# Patient Record
Sex: FEMALE | Race: OTHER | HISPANIC OR LATINO
[De-identification: names, ages, dates, MRNs, and addresses within clinical notes are randomized per-mention and may not be internally consistent; named-entity substitution may affect disease eponyms.]

---

## 2017-01-05 ENCOUNTER — APPOINTMENT (OUTPATIENT)
Dept: ELECTROPHYSIOLOGY | Facility: CLINIC | Age: 82
End: 2017-01-05

## 2017-01-05 DIAGNOSIS — I42.9 CARDIOMYOPATHY, UNSPECIFIED: ICD-10-CM

## 2017-01-05 DIAGNOSIS — I44.7 LEFT BUNDLE-BRANCH BLOCK, UNSPECIFIED: ICD-10-CM

## 2017-03-01 ENCOUNTER — OUTPATIENT (OUTPATIENT)
Dept: OUTPATIENT SERVICES | Facility: HOSPITAL | Age: 82
LOS: 1 days | End: 2017-03-01
Payer: MEDICAID

## 2017-03-06 DIAGNOSIS — R69 ILLNESS, UNSPECIFIED: ICD-10-CM

## 2017-03-09 ENCOUNTER — EMERGENCY (EMERGENCY)
Facility: HOSPITAL | Age: 82
LOS: 1 days | Discharge: HOME | End: 2017-03-09
Attending: EMERGENCY MEDICINE
Payer: MEDICARE

## 2017-03-09 VITALS
SYSTOLIC BLOOD PRESSURE: 95 MMHG | OXYGEN SATURATION: 100 % | RESPIRATION RATE: 16 BRPM | WEIGHT: 119.93 LBS | HEART RATE: 60 BPM | DIASTOLIC BLOOD PRESSURE: 68 MMHG | HEIGHT: 59 IN | TEMPERATURE: 99 F

## 2017-03-09 DIAGNOSIS — Z95.0 PRESENCE OF CARDIAC PACEMAKER: Chronic | ICD-10-CM

## 2017-03-09 LAB
ALBUMIN SERPL ELPH-MCNC: 2.8 G/DL — LOW (ref 3.5–5)
ALP SERPL-CCNC: 160 U/L — HIGH (ref 40–120)
ALT FLD-CCNC: 22 U/L DA — SIGNIFICANT CHANGE UP (ref 10–60)
ANION GAP SERPL CALC-SCNC: 11 MMOL/L — SIGNIFICANT CHANGE UP (ref 5–17)
AST SERPL-CCNC: 49 U/L — HIGH (ref 10–40)
BILIRUB SERPL-MCNC: 1.3 MG/DL — HIGH (ref 0.2–1.2)
BUN SERPL-MCNC: 58 MG/DL — HIGH (ref 7–18)
CALCIUM SERPL-MCNC: 8.5 MG/DL — SIGNIFICANT CHANGE UP (ref 8.4–10.5)
CHLORIDE SERPL-SCNC: 95 MMOL/L — LOW (ref 96–108)
CO2 SERPL-SCNC: 27 MMOL/L — SIGNIFICANT CHANGE UP (ref 22–31)
CREAT SERPL-MCNC: 1.45 MG/DL — HIGH (ref 0.5–1.3)
GLUCOSE SERPL-MCNC: 127 MG/DL — HIGH (ref 70–99)
HCT VFR BLD CALC: 38.7 % — SIGNIFICANT CHANGE UP (ref 34.5–45)
HGB BLD-MCNC: 12.8 G/DL — SIGNIFICANT CHANGE UP (ref 11.5–15.5)
MCHC RBC-ENTMCNC: 32.5 PG — SIGNIFICANT CHANGE UP (ref 27–34)
MCHC RBC-ENTMCNC: 33.1 GM/DL — SIGNIFICANT CHANGE UP (ref 32–36)
MCV RBC AUTO: 98.4 FL — SIGNIFICANT CHANGE UP (ref 80–100)
NT-PROBNP SERPL-SCNC: HIGH PG/ML (ref 0–450)
PLATELET # BLD AUTO: 200 K/UL — SIGNIFICANT CHANGE UP (ref 150–400)
POTASSIUM SERPL-MCNC: 5.4 MMOL/L — HIGH (ref 3.5–5.3)
POTASSIUM SERPL-SCNC: 5.4 MMOL/L — HIGH (ref 3.5–5.3)
PROT SERPL-MCNC: 8 G/DL — SIGNIFICANT CHANGE UP (ref 6–8.3)
RAPID RVP RESULT: DETECTED
RBC # BLD: 3.93 M/UL — SIGNIFICANT CHANGE UP (ref 3.8–5.2)
RBC # FLD: 13.6 % — SIGNIFICANT CHANGE UP (ref 10.3–14.5)
RSV RNA SPEC QL NAA+PROBE: DETECTED
SODIUM SERPL-SCNC: 133 MMOL/L — LOW (ref 135–145)
WBC # BLD: 8.5 K/UL — SIGNIFICANT CHANGE UP (ref 3.8–10.5)
WBC # FLD AUTO: 8.5 K/UL — SIGNIFICANT CHANGE UP (ref 3.8–10.5)

## 2017-03-09 PROCEDURE — 71010: CPT | Mod: 26

## 2017-03-09 PROCEDURE — 99284 EMERGENCY DEPT VISIT MOD MDM: CPT

## 2017-03-09 RX ORDER — SODIUM CHLORIDE 9 MG/ML
500 INJECTION INTRAMUSCULAR; INTRAVENOUS; SUBCUTANEOUS ONCE
Qty: 0 | Refills: 0 | Status: DISCONTINUED | OUTPATIENT
Start: 2017-03-09 | End: 2017-03-09

## 2017-03-09 RX ORDER — AZITHROMYCIN 500 MG/1
1 TABLET, FILM COATED ORAL
Qty: 4 | Refills: 0 | OUTPATIENT
Start: 2017-03-09 | End: 2017-03-13

## 2017-03-09 RX ORDER — ALBUTEROL 90 UG/1
2 AEROSOL, METERED ORAL
Qty: 1 | Refills: 0 | OUTPATIENT
Start: 2017-03-09

## 2017-03-09 RX ORDER — IPRATROPIUM/ALBUTEROL SULFATE 18-103MCG
3 AEROSOL WITH ADAPTER (GRAM) INHALATION EVERY 6 HOURS
Qty: 0 | Refills: 0 | Status: DISCONTINUED | OUTPATIENT
Start: 2017-03-09 | End: 2017-03-13

## 2017-03-09 RX ORDER — IPRATROPIUM BROMIDE 0.2 MG/ML
500 SOLUTION, NON-ORAL INHALATION EVERY 6 HOURS
Qty: 0 | Refills: 0 | Status: DISCONTINUED | OUTPATIENT
Start: 2017-03-09 | End: 2017-03-09

## 2017-03-09 RX ORDER — IPRATROPIUM BROMIDE 0.2 MG/ML
2 SOLUTION, NON-ORAL INHALATION
Qty: 1 | Refills: 0 | OUTPATIENT
Start: 2017-03-09 | End: 2017-03-23

## 2017-03-09 RX ADMIN — Medication 3 MILLILITER(S): at 11:11

## 2017-03-09 RX ADMIN — Medication 50 MILLIGRAM(S): at 13:03

## 2017-03-09 NOTE — ED PROVIDER NOTE - OBJECTIVE STATEMENT
89 y/o F w/ PMHx of heart failure, cardiomyopathy, HTN, PVD, mitral regurgitation, and PVD and PSHx of pacemaker, accompanied by daughter to ED, p/w nasal congestion onset 4 days associated w/ cough and fever. Pt has taken Tylenol for Sx. Pt denies chest pain, shortness of breath, vomiting, diarrhea, Hx of DM, or any other complaint. NKDA. Pt is a former smoker. PMD: Dr. Shah. 89 y/o F w/ PMHx of heart failure, cardiomyopathy, HTN, PVD, mitral regurgitation, and PVD and PSHx of pacemaker, accompanied by daughter to ED, p/w nasal congestion onset 4 days associated w/ cough that is keeping her up at night. Pt has taken Tylenol for Sx. Pt denies chest pain, shortness of breath, vomiting, diarrhea, Hx of DM, or any other complaint. NKDA. Pt is a former smoker. PMD: Dr. Jara.

## 2017-03-09 NOTE — ED PROVIDER NOTE - PMH
Cardiomyopathy    History of Hypertension    Mitral Regurgitation    Peripheral Vascular Disease    PVD (Peripheral Vascular Disease)

## 2017-03-13 DIAGNOSIS — I73.9 PERIPHERAL VASCULAR DISEASE, UNSPECIFIED: ICD-10-CM

## 2017-03-13 DIAGNOSIS — I42.9 CARDIOMYOPATHY, UNSPECIFIED: ICD-10-CM

## 2017-03-13 DIAGNOSIS — Z87.891 PERSONAL HISTORY OF NICOTINE DEPENDENCE: ICD-10-CM

## 2017-03-13 DIAGNOSIS — Z95.0 PRESENCE OF CARDIAC PACEMAKER: ICD-10-CM

## 2017-03-13 DIAGNOSIS — B34.9 VIRAL INFECTION, UNSPECIFIED: ICD-10-CM

## 2017-03-13 DIAGNOSIS — I34.0 NONRHEUMATIC MITRAL (VALVE) INSUFFICIENCY: ICD-10-CM

## 2017-03-13 DIAGNOSIS — I50.9 HEART FAILURE, UNSPECIFIED: ICD-10-CM

## 2017-03-13 DIAGNOSIS — I10 ESSENTIAL (PRIMARY) HYPERTENSION: ICD-10-CM

## 2017-03-13 DIAGNOSIS — J44.1 CHRONIC OBSTRUCTIVE PULMONARY DISEASE WITH (ACUTE) EXACERBATION: ICD-10-CM

## 2017-04-13 PROCEDURE — 85027 COMPLETE CBC AUTOMATED: CPT

## 2017-04-13 PROCEDURE — 87486 CHLMYD PNEUM DNA AMP PROBE: CPT

## 2017-04-13 PROCEDURE — 87798 DETECT AGENT NOS DNA AMP: CPT

## 2017-04-13 PROCEDURE — 87633 RESP VIRUS 12-25 TARGETS: CPT

## 2017-04-13 PROCEDURE — 71045 X-RAY EXAM CHEST 1 VIEW: CPT

## 2017-04-13 PROCEDURE — 80053 COMPREHEN METABOLIC PANEL: CPT

## 2017-04-13 PROCEDURE — 83880 ASSAY OF NATRIURETIC PEPTIDE: CPT

## 2017-04-13 PROCEDURE — 87581 M.PNEUMON DNA AMP PROBE: CPT

## 2017-04-13 PROCEDURE — 94640 AIRWAY INHALATION TREATMENT: CPT

## 2017-04-13 PROCEDURE — 99284 EMERGENCY DEPT VISIT MOD MDM: CPT | Mod: 25

## 2017-04-13 PROCEDURE — 93005 ELECTROCARDIOGRAM TRACING: CPT

## 2017-04-24 ENCOUNTER — APPOINTMENT (OUTPATIENT)
Dept: ELECTROPHYSIOLOGY | Facility: CLINIC | Age: 82
End: 2017-04-24

## 2017-05-01 PROCEDURE — G9001: CPT

## 2017-06-14 ENCOUNTER — INPATIENT (INPATIENT)
Facility: HOSPITAL | Age: 82
LOS: 8 days | Discharge: HOME HEALTH | DRG: 291 | End: 2017-06-23
Attending: INTERNAL MEDICINE | Admitting: INTERNAL MEDICINE
Payer: MEDICARE

## 2017-06-14 VITALS
RESPIRATION RATE: 18 BRPM | OXYGEN SATURATION: 97 % | WEIGHT: 119.93 LBS | HEART RATE: 62 BPM | TEMPERATURE: 96 F | DIASTOLIC BLOOD PRESSURE: 50 MMHG | SYSTOLIC BLOOD PRESSURE: 78 MMHG | HEIGHT: 60 IN

## 2017-06-14 DIAGNOSIS — Z95.0 PRESENCE OF CARDIAC PACEMAKER: Chronic | ICD-10-CM

## 2017-06-14 DIAGNOSIS — I42.9 CARDIOMYOPATHY, UNSPECIFIED: ICD-10-CM

## 2017-06-14 DIAGNOSIS — I50.21 ACUTE SYSTOLIC (CONGESTIVE) HEART FAILURE: ICD-10-CM

## 2017-06-14 DIAGNOSIS — E11.9 TYPE 2 DIABETES MELLITUS WITHOUT COMPLICATIONS: ICD-10-CM

## 2017-06-14 DIAGNOSIS — Z29.9 ENCOUNTER FOR PROPHYLACTIC MEASURES, UNSPECIFIED: ICD-10-CM

## 2017-06-14 LAB
ALBUMIN SERPL ELPH-MCNC: 2.5 G/DL — LOW (ref 3.5–5)
ALP SERPL-CCNC: 154 U/L — HIGH (ref 40–120)
ALT FLD-CCNC: 16 U/L DA — SIGNIFICANT CHANGE UP (ref 10–60)
ANION GAP SERPL CALC-SCNC: 10 MMOL/L — SIGNIFICANT CHANGE UP (ref 5–17)
APTT BLD: 33.7 SEC — SIGNIFICANT CHANGE UP (ref 27.5–37.4)
AST SERPL-CCNC: 35 U/L — SIGNIFICANT CHANGE UP (ref 10–40)
BILIRUB SERPL-MCNC: 1.4 MG/DL — HIGH (ref 0.2–1.2)
BUN SERPL-MCNC: 83 MG/DL — HIGH (ref 7–18)
CALCIUM SERPL-MCNC: 8.4 MG/DL — SIGNIFICANT CHANGE UP (ref 8.4–10.5)
CHLORIDE SERPL-SCNC: 96 MMOL/L — SIGNIFICANT CHANGE UP (ref 96–108)
CK MB BLD-MCNC: 2.7 % — SIGNIFICANT CHANGE UP (ref 0–3.5)
CK MB BLD-MCNC: <1.4 % — SIGNIFICANT CHANGE UP (ref 0–3.5)
CK MB CFR SERPL CALC: 1.2 NG/ML — SIGNIFICANT CHANGE UP (ref 0–3.6)
CK MB CFR SERPL CALC: <1 NG/ML — SIGNIFICANT CHANGE UP (ref 0–3.6)
CK SERPL-CCNC: 45 U/L — SIGNIFICANT CHANGE UP (ref 21–215)
CK SERPL-CCNC: 74 U/L — SIGNIFICANT CHANGE UP (ref 21–215)
CO2 SERPL-SCNC: 25 MMOL/L — SIGNIFICANT CHANGE UP (ref 22–31)
CREAT SERPL-MCNC: 2.23 MG/DL — HIGH (ref 0.5–1.3)
EOSINOPHIL NFR BLD AUTO: 7 % — HIGH (ref 0–6)
GLUCOSE SERPL-MCNC: 145 MG/DL — HIGH (ref 70–99)
HCT VFR BLD CALC: 38.8 % — SIGNIFICANT CHANGE UP (ref 34.5–45)
HGB BLD-MCNC: 12.5 G/DL — SIGNIFICANT CHANGE UP (ref 11.5–15.5)
INR BLD: 1.53 RATIO — HIGH (ref 0.88–1.16)
LIDOCAIN IGE QN: 176 U/L — SIGNIFICANT CHANGE UP (ref 73–393)
LYMPHOCYTES # BLD AUTO: 26 % — SIGNIFICANT CHANGE UP (ref 13–44)
MCHC RBC-ENTMCNC: 32.2 GM/DL — SIGNIFICANT CHANGE UP (ref 32–36)
MCHC RBC-ENTMCNC: 32.7 PG — SIGNIFICANT CHANGE UP (ref 27–34)
MCV RBC AUTO: 101.8 FL — HIGH (ref 80–100)
MONOCYTES NFR BLD AUTO: 9 % — SIGNIFICANT CHANGE UP (ref 2–14)
NEUTROPHILS NFR BLD AUTO: 58 % — SIGNIFICANT CHANGE UP (ref 43–77)
NT-PROBNP SERPL-SCNC: HIGH PG/ML (ref 0–450)
PLATELET # BLD AUTO: 139 K/UL — LOW (ref 150–400)
POTASSIUM SERPL-MCNC: 4.1 MMOL/L — SIGNIFICANT CHANGE UP (ref 3.5–5.3)
POTASSIUM SERPL-SCNC: 4.1 MMOL/L — SIGNIFICANT CHANGE UP (ref 3.5–5.3)
PROT SERPL-MCNC: 7.6 G/DL — SIGNIFICANT CHANGE UP (ref 6–8.3)
PROTHROM AB SERPL-ACNC: 16.8 SEC — HIGH (ref 9.8–12.7)
RBC # BLD: 3.82 M/UL — SIGNIFICANT CHANGE UP (ref 3.8–5.2)
RBC # FLD: 14.3 % — SIGNIFICANT CHANGE UP (ref 10.3–14.5)
SODIUM SERPL-SCNC: 131 MMOL/L — LOW (ref 135–145)
TROPONIN I SERPL-MCNC: 0.17 NG/ML — HIGH (ref 0–0.04)
TROPONIN I SERPL-MCNC: 0.17 NG/ML — HIGH (ref 0–0.04)
WBC # BLD: 5.2 K/UL — SIGNIFICANT CHANGE UP (ref 3.8–10.5)
WBC # FLD AUTO: 5.2 K/UL — SIGNIFICANT CHANGE UP (ref 3.8–10.5)

## 2017-06-14 PROCEDURE — 99291 CRITICAL CARE FIRST HOUR: CPT

## 2017-06-14 PROCEDURE — 71010: CPT | Mod: 26

## 2017-06-14 RX ORDER — SODIUM CHLORIDE 9 MG/ML
1000 INJECTION, SOLUTION INTRAVENOUS
Qty: 0 | Refills: 0 | Status: DISCONTINUED | OUTPATIENT
Start: 2017-06-14 | End: 2017-06-23

## 2017-06-14 RX ORDER — DEXTROSE 50 % IN WATER 50 %
12.5 SYRINGE (ML) INTRAVENOUS ONCE
Qty: 0 | Refills: 0 | Status: DISCONTINUED | OUTPATIENT
Start: 2017-06-14 | End: 2017-06-23

## 2017-06-14 RX ORDER — DEXTROSE 50 % IN WATER 50 %
25 SYRINGE (ML) INTRAVENOUS ONCE
Qty: 0 | Refills: 0 | Status: DISCONTINUED | OUTPATIENT
Start: 2017-06-14 | End: 2017-06-23

## 2017-06-14 RX ORDER — PANTOPRAZOLE SODIUM 20 MG/1
40 TABLET, DELAYED RELEASE ORAL
Qty: 0 | Refills: 0 | Status: DISCONTINUED | OUTPATIENT
Start: 2017-06-14 | End: 2017-06-23

## 2017-06-14 RX ORDER — ASPIRIN/CALCIUM CARB/MAGNESIUM 324 MG
81 TABLET ORAL DAILY
Qty: 0 | Refills: 0 | Status: DISCONTINUED | OUTPATIENT
Start: 2017-06-14 | End: 2017-06-23

## 2017-06-14 RX ORDER — INSULIN LISPRO 100/ML
VIAL (ML) SUBCUTANEOUS
Qty: 0 | Refills: 0 | Status: DISCONTINUED | OUTPATIENT
Start: 2017-06-14 | End: 2017-06-23

## 2017-06-14 RX ORDER — SODIUM CHLORIDE 9 MG/ML
3 INJECTION INTRAMUSCULAR; INTRAVENOUS; SUBCUTANEOUS ONCE
Qty: 0 | Refills: 0 | Status: COMPLETED | OUTPATIENT
Start: 2017-06-14 | End: 2017-06-14

## 2017-06-14 RX ORDER — ASPIRIN/CALCIUM CARB/MAGNESIUM 324 MG
162 TABLET ORAL ONCE
Qty: 0 | Refills: 0 | Status: COMPLETED | OUTPATIENT
Start: 2017-06-14 | End: 2017-06-14

## 2017-06-14 RX ORDER — GLUCAGON INJECTION, SOLUTION 0.5 MG/.1ML
1 INJECTION, SOLUTION SUBCUTANEOUS ONCE
Qty: 0 | Refills: 0 | Status: DISCONTINUED | OUTPATIENT
Start: 2017-06-14 | End: 2017-06-23

## 2017-06-14 RX ORDER — DOBUTAMINE HCL 250MG/20ML
2.5 VIAL (ML) INTRAVENOUS
Qty: 500 | Refills: 0 | Status: DISCONTINUED | OUTPATIENT
Start: 2017-06-14 | End: 2017-06-16

## 2017-06-14 RX ORDER — FUROSEMIDE 40 MG
5 TABLET ORAL
Qty: 100 | Refills: 0 | Status: DISCONTINUED | OUTPATIENT
Start: 2017-06-14 | End: 2017-06-15

## 2017-06-14 RX ORDER — DEXTROSE 50 % IN WATER 50 %
1 SYRINGE (ML) INTRAVENOUS ONCE
Qty: 0 | Refills: 0 | Status: DISCONTINUED | OUTPATIENT
Start: 2017-06-14 | End: 2017-06-23

## 2017-06-14 RX ORDER — LANOLIN ALCOHOL/MO/W.PET/CERES
3 CREAM (GRAM) TOPICAL ONCE
Qty: 0 | Refills: 0 | Status: COMPLETED | OUTPATIENT
Start: 2017-06-14 | End: 2017-06-14

## 2017-06-14 RX ORDER — FUROSEMIDE 40 MG
10 TABLET ORAL
Qty: 500 | Refills: 0 | Status: DISCONTINUED | OUTPATIENT
Start: 2017-06-14 | End: 2017-06-14

## 2017-06-14 RX ORDER — HEPARIN SODIUM 5000 [USP'U]/ML
5000 INJECTION INTRAVENOUS; SUBCUTANEOUS EVERY 8 HOURS
Qty: 0 | Refills: 0 | Status: DISCONTINUED | OUTPATIENT
Start: 2017-06-14 | End: 2017-06-23

## 2017-06-14 RX ADMIN — Medication 2.5 MG/HR: at 18:29

## 2017-06-14 RX ADMIN — Medication 2.5 MG/HR: at 13:25

## 2017-06-14 RX ADMIN — Medication 162 MILLIGRAM(S): at 11:36

## 2017-06-14 RX ADMIN — Medication 4.08 MICROGRAM(S)/KG/MIN: at 13:10

## 2017-06-14 RX ADMIN — SODIUM CHLORIDE 3 MILLILITER(S): 9 INJECTION INTRAMUSCULAR; INTRAVENOUS; SUBCUTANEOUS at 11:14

## 2017-06-14 RX ADMIN — HEPARIN SODIUM 5000 UNIT(S): 5000 INJECTION INTRAVENOUS; SUBCUTANEOUS at 21:58

## 2017-06-14 RX ADMIN — Medication 4.08 MICROGRAM(S)/KG/MIN: at 18:29

## 2017-06-14 NOTE — ED PROVIDER NOTE - MEDICAL DECISION MAKING DETAILS
88 y/o F pt presents to the ED for likely exacerbation of chronic heart failure. Severe in stage heart disease due to hypotension. Discussed with Dr. Gomes. Likely an inotropic or vasopressor. If consistent with pt goals of care, Dr. gomes will see pt stat in ED and will discuss. Will get labs, CXR, and reassess.

## 2017-06-14 NOTE — ED PROVIDER NOTE - CHPI ED SYMPTOMS NEG
no fever/no productive cough, no abd pain, no chest pain, no nausea, no vomiting, no diarrhea/no chills

## 2017-06-14 NOTE — H&P ADULT - GASTROINTESTINAL DETAILS
no rebound tenderness/no masses palpable/no organomegaly/no guarding/nontender/no distention/no rigidity/no bruit/normal/soft/bowel sounds normal

## 2017-06-14 NOTE — H&P ADULT - NEGATIVE SKIN SYMPTOMS
no rash/no hair loss/no pitted nails/no dryness/no change in size/color of mole/no itching/no brittle nails/no tumor

## 2017-06-14 NOTE — ED PROVIDER NOTE - CRITICAL CARE PROVIDED
documentation/consult w/ pt's family directly relating to pts condition/additional history taking/direct patient care (not related to procedure)/interpretation of diagnostic studies/consultation with other physicians

## 2017-06-14 NOTE — ED ADULT NURSE REASSESSMENT NOTE - NS ED NURSE REASSESS COMMENT FT1
PT remains awake breathing unlabored reevaluated by DR Espinal D/C home via [employer] instruction reviewed with daughter

## 2017-06-14 NOTE — ED ADULT TRIAGE NOTE - CHIEF COMPLAINT QUOTE
brought in by daughter for c/o dizziness , difficulty breathing last night . was told by pmd to bring  pt to ER today

## 2017-06-14 NOTE — H&P ADULT - RS GEN PE MLT RESP DETAILS PC
rales/airway patent/normal/breath sounds equal/good air movement/respirations non-labored/clear to auscultation bilaterally/no intercostal retractions/no chest wall tenderness

## 2017-06-14 NOTE — H&P ADULT - NEGATIVE NEUROLOGICAL SYMPTOMS
no loss of sensation/no generalized seizures/no weakness/no difficulty walking/no confusion/no transient paralysis/no tremors/no loss of consciousness/no hemiparesis/no headache/no focal seizures/no paresthesias/no facial palsy/no syncope/no vertigo

## 2017-06-14 NOTE — H&P ADULT - NEGATIVE GASTROINTESTINAL SYMPTOMS
no vomiting/no change in bowel habits/no constipation/no melena/no jaundice/no hematochezia/no hiccoughs/no abdominal pain/no diarrhea/no nausea/no flatulence/no steatorrhea

## 2017-06-14 NOTE — H&P ADULT - PMH
Acute on chronic systolic congestive heart failure    Cardiomyopathy    History of Hypertension    Mitral Regurgitation    Peripheral Vascular Disease    PVD (Peripheral Vascular Disease)    Type 2 diabetes mellitus without complication, without long-term current use of insulin

## 2017-06-14 NOTE — H&P ADULT - ASSESSMENT
88 y/o Kazakh speaking F from home lives with daughter. PMH of severe systolic CHF (s/p BiV AICD 2010 EF 25%), DM (diet controlled) and HTN. Pt presented to the hospital due to progressive decline over the past several months who has been seen by cardiology for being acutely worse over last several days presents to the ED for progressively increased dyspnea on exertion, orthopnea, leg swelling and light headed feeling today. Pt states the light headed feeling started today which brought her to the ED, she takes Lasix 20mg 4 times a day without improvement.

## 2017-06-14 NOTE — ED PROVIDER NOTE - NS ED MD SCRIBE ATTENDING SCRIBE SECTIONS
DISPOSITION/PAST MEDICAL/SURGICAL/SOCIAL HISTORY/HISTORY OF PRESENT ILLNESS/REVIEW OF SYSTEMS/VITAL SIGNS( Pullset)/PHYSICAL EXAM

## 2017-06-14 NOTE — H&P ADULT - NEGATIVE MUSCULOSKELETAL SYMPTOMS
no stiffness/no muscle cramps/no arthritis/no arthralgia/no muscle weakness/no neck pain/no myalgia/no joint swelling

## 2017-06-14 NOTE — H&P ADULT - NEGATIVE ENMT SYMPTOMS
no nose bleeds/no nasal congestion/no vertigo/no nasal obstruction/no hearing difficulty/no abnormal taste sensation/no tinnitus/no sinus symptoms/no gum bleeding/no dry mouth/no recurrent cold sores/no dysphagia/no nasal discharge/no ear pain/no post-nasal discharge/no throat pain

## 2017-06-14 NOTE — H&P ADULT - HISTORY OF PRESENT ILLNESS
88 y/o Northern Irish speaking F from home lives with daughter. PMH of severe systolic CHF (s/p BiV AICD 2010 EF 25%), DM (diet controlled) and HTN. Pt presented to the hospital due to progressive decline over the past several months who has been seen by cardiology for being acutely worse over last several days presents to the ED for progressively increased dyspnea on exertion, orthopnea, leg swelling and light headed feeling today. Pt states the light headed feeling started today which brought her to the ED, she takes Lasix 20mg 4 times a day without improvement. Pt denies fevers, chills, productive cough, abd pain, chest pain, nausea, vomiting, diarrhea, urinary symptoms or any other complaints.     GOD discussed with family who requested for patient to be DNR / DNI (as per pt's previously stated wishes to her family)

## 2017-06-14 NOTE — ED PROVIDER NOTE - OBJECTIVE STATEMENT
89 y/o F pt w/ PMHx severe systolic CHF s/p AICD who has been in progressive decline over the past several months who has been seen by cardiology for being acutely worse over last several days presents to the ED for progressively increased dyspnea on exertion, orthopnea, leg swelling and light headed feeling today. Pt states the light headed feeling started today which brought her to the ED. Pt denies fevers/chills, productive cough, abd pain, chest pain, nausea, vomiting, diarrhea, urinary sx, or any other complaints. NKDA.

## 2017-06-14 NOTE — H&P ADULT - PROBLEM SELECTOR PLAN 1
pt was admitted due to worsening SOB   CXR showing pulmonary congestion   pt was started on Lasix and Dobutamine drip   BNP 11221  c/w strict I & O   c/w daily weights  Cardiology Dr. Gomes

## 2017-06-15 DIAGNOSIS — N17.9 ACUTE KIDNEY FAILURE, UNSPECIFIED: ICD-10-CM

## 2017-06-15 DIAGNOSIS — E87.6 HYPOKALEMIA: ICD-10-CM

## 2017-06-15 DIAGNOSIS — N18.3 CHRONIC KIDNEY DISEASE, STAGE 3 (MODERATE): ICD-10-CM

## 2017-06-15 DIAGNOSIS — E87.1 HYPO-OSMOLALITY AND HYPONATREMIA: ICD-10-CM

## 2017-06-15 DIAGNOSIS — I50.23 ACUTE ON CHRONIC SYSTOLIC (CONGESTIVE) HEART FAILURE: ICD-10-CM

## 2017-06-15 LAB
ALBUMIN SERPL ELPH-MCNC: 2.8 G/DL — LOW (ref 3.5–5)
ALP SERPL-CCNC: 149 U/L — HIGH (ref 40–120)
ALT FLD-CCNC: 15 U/L DA — SIGNIFICANT CHANGE UP (ref 10–60)
ANION GAP SERPL CALC-SCNC: 11 MMOL/L — SIGNIFICANT CHANGE UP (ref 5–17)
AST SERPL-CCNC: 25 U/L — SIGNIFICANT CHANGE UP (ref 10–40)
BASOPHILS # BLD AUTO: 0 K/UL — SIGNIFICANT CHANGE UP (ref 0–0.2)
BASOPHILS NFR BLD AUTO: 0.8 % — SIGNIFICANT CHANGE UP (ref 0–2)
BILIRUB SERPL-MCNC: 1.8 MG/DL — HIGH (ref 0.2–1.2)
BUN SERPL-MCNC: 86 MG/DL — HIGH (ref 7–18)
CALCIUM SERPL-MCNC: 9.4 MG/DL — SIGNIFICANT CHANGE UP (ref 8.4–10.5)
CHLORIDE SERPL-SCNC: 95 MMOL/L — LOW (ref 96–108)
CHOLEST SERPL-MCNC: 132 MG/DL — SIGNIFICANT CHANGE UP (ref 10–199)
CK MB BLD-MCNC: 2.2 % — SIGNIFICANT CHANGE UP (ref 0–3.5)
CK MB CFR SERPL CALC: 1.1 NG/ML — SIGNIFICANT CHANGE UP (ref 0–3.6)
CK SERPL-CCNC: 51 U/L — SIGNIFICANT CHANGE UP (ref 21–215)
CO2 SERPL-SCNC: 27 MMOL/L — SIGNIFICANT CHANGE UP (ref 22–31)
CREAT SERPL-MCNC: 1.79 MG/DL — HIGH (ref 0.5–1.3)
EOSINOPHIL # BLD AUTO: 0.2 K/UL — SIGNIFICANT CHANGE UP (ref 0–0.5)
EOSINOPHIL NFR BLD AUTO: 3.6 % — SIGNIFICANT CHANGE UP (ref 0–6)
FOLATE SERPL-MCNC: 11.4 NG/ML — SIGNIFICANT CHANGE UP (ref 4.8–24.2)
GLUCOSE SERPL-MCNC: 110 MG/DL — HIGH (ref 70–99)
HBA1C BLD-MCNC: 7 % — HIGH (ref 4–5.6)
HCT VFR BLD CALC: 39 % — SIGNIFICANT CHANGE UP (ref 34.5–45)
HDLC SERPL-MCNC: 50 MG/DL — SIGNIFICANT CHANGE UP (ref 40–125)
HGB BLD-MCNC: 12.6 G/DL — SIGNIFICANT CHANGE UP (ref 11.5–15.5)
LIPID PNL WITH DIRECT LDL SERPL: 70 MG/DL — SIGNIFICANT CHANGE UP
LYMPHOCYTES # BLD AUTO: 0.9 K/UL — LOW (ref 1–3.3)
LYMPHOCYTES # BLD AUTO: 16.8 % — SIGNIFICANT CHANGE UP (ref 13–44)
MAGNESIUM SERPL-MCNC: 2.4 MG/DL — SIGNIFICANT CHANGE UP (ref 1.6–2.6)
MCHC RBC-ENTMCNC: 32.5 GM/DL — SIGNIFICANT CHANGE UP (ref 32–36)
MCHC RBC-ENTMCNC: 32.5 PG — SIGNIFICANT CHANGE UP (ref 27–34)
MCV RBC AUTO: 100 FL — SIGNIFICANT CHANGE UP (ref 80–100)
MONOCYTES # BLD AUTO: 0.7 K/UL — SIGNIFICANT CHANGE UP (ref 0–0.9)
MONOCYTES NFR BLD AUTO: 13.6 % — SIGNIFICANT CHANGE UP (ref 2–14)
NEUTROPHILS # BLD AUTO: 3.5 K/UL — SIGNIFICANT CHANGE UP (ref 1.8–7.4)
NEUTROPHILS NFR BLD AUTO: 65.2 % — SIGNIFICANT CHANGE UP (ref 43–77)
OSMOLALITY UR: 352 MOS/KG — SIGNIFICANT CHANGE UP (ref 50–1200)
PHOSPHATE SERPL-MCNC: 3.7 MG/DL — SIGNIFICANT CHANGE UP (ref 2.5–4.5)
PLATELET # BLD AUTO: 180 K/UL — SIGNIFICANT CHANGE UP (ref 150–400)
POTASSIUM SERPL-MCNC: 3.1 MMOL/L — LOW (ref 3.5–5.3)
POTASSIUM SERPL-SCNC: 3.1 MMOL/L — LOW (ref 3.5–5.3)
PROT ?TM UR-MCNC: <5 MG/DL — SIGNIFICANT CHANGE UP (ref 0–12)
PROT SERPL-MCNC: 7.6 G/DL — SIGNIFICANT CHANGE UP (ref 6–8.3)
RBC # BLD: 3.89 M/UL — SIGNIFICANT CHANGE UP (ref 3.8–5.2)
RBC # FLD: 13.7 % — SIGNIFICANT CHANGE UP (ref 10.3–14.5)
SODIUM SERPL-SCNC: 133 MMOL/L — LOW (ref 135–145)
SODIUM UR-SCNC: 92 MMOL/L — SIGNIFICANT CHANGE UP (ref 40–220)
TOTAL CHOLESTEROL/HDL RATIO MEASUREMENT: 2.6 RATIO — LOW (ref 3.3–7.1)
TRIGL SERPL-MCNC: 60 MG/DL — SIGNIFICANT CHANGE UP (ref 10–149)
TROPONIN I SERPL-MCNC: 0.22 NG/ML — HIGH (ref 0–0.04)
VIT B12 SERPL-MCNC: 1244 PG/ML — HIGH (ref 243–894)
WBC # BLD: 5.4 K/UL — SIGNIFICANT CHANGE UP (ref 3.8–10.5)
WBC # FLD AUTO: 5.4 K/UL — SIGNIFICANT CHANGE UP (ref 3.8–10.5)

## 2017-06-15 RX ORDER — POTASSIUM CHLORIDE 20 MEQ
40 PACKET (EA) ORAL EVERY 4 HOURS
Qty: 0 | Refills: 0 | Status: COMPLETED | OUTPATIENT
Start: 2017-06-15 | End: 2017-06-15

## 2017-06-15 RX ADMIN — HEPARIN SODIUM 5000 UNIT(S): 5000 INJECTION INTRAVENOUS; SUBCUTANEOUS at 21:10

## 2017-06-15 RX ADMIN — PANTOPRAZOLE SODIUM 40 MILLIGRAM(S): 20 TABLET, DELAYED RELEASE ORAL at 05:07

## 2017-06-15 RX ADMIN — HEPARIN SODIUM 5000 UNIT(S): 5000 INJECTION INTRAVENOUS; SUBCUTANEOUS at 13:23

## 2017-06-15 RX ADMIN — HEPARIN SODIUM 5000 UNIT(S): 5000 INJECTION INTRAVENOUS; SUBCUTANEOUS at 05:06

## 2017-06-15 RX ADMIN — Medication 81 MILLIGRAM(S): at 12:18

## 2017-06-15 RX ADMIN — Medication 4.08 MICROGRAM(S)/KG/MIN: at 12:19

## 2017-06-15 RX ADMIN — Medication 1: at 12:19

## 2017-06-15 RX ADMIN — Medication 40 MILLIEQUIVALENT(S): at 13:22

## 2017-06-15 RX ADMIN — Medication 40 MILLIEQUIVALENT(S): at 17:08

## 2017-06-15 RX ADMIN — Medication 1: at 17:08

## 2017-06-15 RX ADMIN — Medication 2.5 MG/HR: at 12:18

## 2017-06-15 NOTE — PROGRESS NOTE ADULT - SUBJECTIVE AND OBJECTIVE BOX
Patient is a 89y old  Female who presents with a chief complaint of worsening shortness of breath (14 Jun 2017 13:34)    -=OVERNIGHT EVENTS / INTERVAL HPI / SUBJECTIVE=-  Patient seen with grand son at bedside. Patient doing well.  Breathing better. Reports lower extremity swelling is improving. No fevers, chills, n v cp.     -=OBJECTIVE=-       --VITALS--  Vital Signs Last 24 Hrs  T(C): 36.7, Max: 36.7 (06-14 @ 16:59)  T(F): 98.1, Max: 98.1 (06-15 @ 11:20)  HR: 70 (66 - 82)  BP: 108/61 (101/58 - 131/67)  BP(mean): --  RR: 17 (17 - 20)  SpO2: 97% (95% - 100%)         --PHYSICAL EXAM--  General: NAD, elderly  Neurology: A&Ox3  Respiratory: CTA B/L, no wheezes, no rhonchi, no rales  CV: RRR, S1S2, no murmur  Abdominal: Soft, NT, ND no palpable mass, bowel sounds positive  MSK: 1+ edema to mid shin           --LABS--                        12.6   5.4   )-----------( 180      ( 15 Feliciano 2017 08:11 )             39.0     06-15    133<L>  |  95<L>  |  86<H>  ----------------------------<  110<H>  3.1<L>   |  27  |  1.79<H>    Ca    9.4      15 Feliciano 2017 08:11  Phos  3.7     06-15  Mg     2.4     06-15    TPro  7.6  /  Alb  2.8<L>  /  TBili  1.8<H>  /  DBili  x   /  AST  25  /  ALT  15  /  AlkPhos  149<H>  06-15    06-15 Chol 132 LDL 70 HDL 50 Trig 60         --IMAGING--    CXR  FINDINGS:   Left-sided AICD in situ.  There is stable cardiomegaly.  Increased reticular/vascular markings now seen - right lung more than   left - may be due to mild pulmonary vascular congestion.  No focal lung consolidation. No pleural effusion or pneumothorax.    IMPRESSION:   Since 3/9/17 exam, there is development of mild pulmonary vascular   congestion.          --MEDICATIONS--  MEDICATIONS  (STANDING):  furosemide Infusion 5mG/Hr IV Continuous <Continuous>  DOBUTamine Infusion 2.5MICROgram(s)/kG/Min IV Continuous <Continuous>  aspirin enteric coated 81milliGRAM(s) Oral daily  insulin lispro (HumaLOG) corrective regimen sliding scale  SubCutaneous three times a day before meals  dextrose 5%. 1000milliLiter(s) IV Continuous <Continuous>  dextrose 50% Injectable 12.5Gram(s) IV Push once  dextrose 50% Injectable 25Gram(s) IV Push once  dextrose 50% Injectable 25Gram(s) IV Push once  heparin  Injectable 5000Unit(s) SubCutaneous every 8 hours  pantoprazole    Tablet 40milliGRAM(s) Oral before breakfast  potassium chloride    Tablet ER 40milliEquivalent(s) Oral every 4 hours    MEDICATIONS  (PRN):  dextrose Gel 1Dose(s) Oral once PRN Blood Glucose LESS THAN 70 milliGRAM(s)/deciLiter  glucagon  Injectable 1milliGRAM(s) IntraMuscular once PRN Glucose <70 milliGRAM(s)/deciLiter

## 2017-06-15 NOTE — CONSULT NOTE ADULT - PROBLEM SELECTOR RECOMMENDATION 2
Previous SCr 1.45 on 3/9/17  Pt with CKD-3 in the setting of HTN/ CHF  Recommend to check U pr/Cr. .

## 2017-06-15 NOTE — PROGRESS NOTE ADULT - PROBLEM SELECTOR PLAN 1
BiV AICD 2010 EF 25%. BNP 24634  -CXR showing pulmonary congestion   -Improving s/p Lasix gtt and Dobutamine gtt, continue  -c/w strict I & O   -c/w daily weights  -F/up Cardio () BiV AICD 2010 EF 25%. BNP 46085  -CXR showing pulmonary congestion   -Improving s/p Lasix gtt and Dobutamine gtt (UOP ~1L), continue  -c/w strict I & O   -c/w daily weights  -F/up Cardio ()

## 2017-06-15 NOTE — CONSULT NOTE ADULT - ASSESSMENT
Patient is a 89 Slovak speaking female with severe systolic CHF (s/p BiV AICD 2010 EF 25%), CKD-3 a/w CHF exacerbation and NORM on CKD-3 with hyponatremia and hypokalemia.

## 2017-06-15 NOTE — PROGRESS NOTE ADULT - ASSESSMENT
89F PMHx severe systolic CHF , DM (diet controlled), HTN p/w progressive decline and acutely worsened dyspnea on exertion, orthopnea, leg swelling, and light headed. Admitted for CHF exacerbation    #?Toe Nail Infection  -F/up Podiatry Consult 89F PMHx severe systolic CHF , DM (diet controlled), HTN p/w progressive decline and acutely worsened dyspnea on exertion, orthopnea, leg swelling, and light headed. Admitted for CHF exacerbation    #?Toe Nail Infection  -F/up Podiatry Consult    #NORM: likely cardiorenal  -Improving  -Monitor Cr

## 2017-06-15 NOTE — CONSULT NOTE ADULT - PROBLEM SELECTOR RECOMMENDATION 9
Previous SCr 1.45 on 3/9/17  NORM on CKD-3 in the setting of CHF exacerbation; cardio-renal syndrome  Renal function improving with dobutamine assisted diuresis with Lasix gtt.  Check UA and urine lytes. Strict I/Os. Will avoid Renal US at this time since renal function is improving.  Strict I/Os. Avoid nephrotoxins/ NSAIDs/ RCA. Monitor BMP.

## 2017-06-16 LAB
ALBUMIN SERPL ELPH-MCNC: 2.7 G/DL — LOW (ref 3.5–5)
ALP SERPL-CCNC: 142 U/L — HIGH (ref 40–120)
ALT FLD-CCNC: 13 U/L DA — SIGNIFICANT CHANGE UP (ref 10–60)
ANION GAP SERPL CALC-SCNC: 12 MMOL/L — SIGNIFICANT CHANGE UP (ref 5–17)
ANION GAP SERPL CALC-SCNC: 9 MMOL/L — SIGNIFICANT CHANGE UP (ref 5–17)
AST SERPL-CCNC: 26 U/L — SIGNIFICANT CHANGE UP (ref 10–40)
BASOPHILS # BLD AUTO: 0 K/UL — SIGNIFICANT CHANGE UP (ref 0–0.2)
BASOPHILS NFR BLD AUTO: 1 % — SIGNIFICANT CHANGE UP (ref 0–2)
BILIRUB SERPL-MCNC: 1.8 MG/DL — HIGH (ref 0.2–1.2)
BUN SERPL-MCNC: 71 MG/DL — HIGH (ref 7–18)
BUN SERPL-MCNC: 78 MG/DL — HIGH (ref 7–18)
CALCIUM SERPL-MCNC: 8.9 MG/DL — SIGNIFICANT CHANGE UP (ref 8.4–10.5)
CALCIUM SERPL-MCNC: 9.2 MG/DL — SIGNIFICANT CHANGE UP (ref 8.4–10.5)
CHLORIDE SERPL-SCNC: 93 MMOL/L — LOW (ref 96–108)
CHLORIDE SERPL-SCNC: 94 MMOL/L — LOW (ref 96–108)
CO2 SERPL-SCNC: 27 MMOL/L — SIGNIFICANT CHANGE UP (ref 22–31)
CO2 SERPL-SCNC: 32 MMOL/L — HIGH (ref 22–31)
CREAT SERPL-MCNC: 1.67 MG/DL — HIGH (ref 0.5–1.3)
CREAT SERPL-MCNC: 1.81 MG/DL — HIGH (ref 0.5–1.3)
EOSINOPHIL # BLD AUTO: 0.2 K/UL — SIGNIFICANT CHANGE UP (ref 0–0.5)
EOSINOPHIL NFR BLD AUTO: 3.8 % — SIGNIFICANT CHANGE UP (ref 0–6)
GLUCOSE SERPL-MCNC: 109 MG/DL — HIGH (ref 70–99)
GLUCOSE SERPL-MCNC: 137 MG/DL — HIGH (ref 70–99)
HCT VFR BLD CALC: 38.4 % — SIGNIFICANT CHANGE UP (ref 34.5–45)
HGB BLD-MCNC: 12.8 G/DL — SIGNIFICANT CHANGE UP (ref 11.5–15.5)
LYMPHOCYTES # BLD AUTO: 1 K/UL — SIGNIFICANT CHANGE UP (ref 1–3.3)
LYMPHOCYTES # BLD AUTO: 20.8 % — SIGNIFICANT CHANGE UP (ref 13–44)
MAGNESIUM SERPL-MCNC: 2.4 MG/DL — SIGNIFICANT CHANGE UP (ref 1.6–2.6)
MCHC RBC-ENTMCNC: 33 PG — SIGNIFICANT CHANGE UP (ref 27–34)
MCHC RBC-ENTMCNC: 33.2 GM/DL — SIGNIFICANT CHANGE UP (ref 32–36)
MCV RBC AUTO: 99.3 FL — SIGNIFICANT CHANGE UP (ref 80–100)
MONOCYTES # BLD AUTO: 0.8 K/UL — SIGNIFICANT CHANGE UP (ref 0–0.9)
MONOCYTES NFR BLD AUTO: 16.5 % — HIGH (ref 2–14)
NEUTROPHILS # BLD AUTO: 2.8 K/UL — SIGNIFICANT CHANGE UP (ref 1.8–7.4)
NEUTROPHILS NFR BLD AUTO: 57.9 % — SIGNIFICANT CHANGE UP (ref 43–77)
PHOSPHATE SERPL-MCNC: 3.2 MG/DL — SIGNIFICANT CHANGE UP (ref 2.5–4.5)
PLATELET # BLD AUTO: 166 K/UL — SIGNIFICANT CHANGE UP (ref 150–400)
POTASSIUM SERPL-MCNC: 3.2 MMOL/L — LOW (ref 3.5–5.3)
POTASSIUM SERPL-MCNC: 3.6 MMOL/L — SIGNIFICANT CHANGE UP (ref 3.5–5.3)
POTASSIUM SERPL-SCNC: 3.2 MMOL/L — LOW (ref 3.5–5.3)
POTASSIUM SERPL-SCNC: 3.6 MMOL/L — SIGNIFICANT CHANGE UP (ref 3.5–5.3)
PROT SERPL-MCNC: 7.6 G/DL — SIGNIFICANT CHANGE UP (ref 6–8.3)
RBC # BLD: 3.87 M/UL — SIGNIFICANT CHANGE UP (ref 3.8–5.2)
RBC # FLD: 14.6 % — HIGH (ref 10.3–14.5)
SODIUM SERPL-SCNC: 133 MMOL/L — LOW (ref 135–145)
SODIUM SERPL-SCNC: 134 MMOL/L — LOW (ref 135–145)
WBC # BLD: 4.8 K/UL — SIGNIFICANT CHANGE UP (ref 3.8–10.5)
WBC # FLD AUTO: 4.8 K/UL — SIGNIFICANT CHANGE UP (ref 3.8–10.5)

## 2017-06-16 RX ORDER — POTASSIUM CHLORIDE 20 MEQ
40 PACKET (EA) ORAL
Qty: 0 | Refills: 0 | Status: COMPLETED | OUTPATIENT
Start: 2017-06-16 | End: 2017-06-17

## 2017-06-16 RX ORDER — FUROSEMIDE 40 MG
20 TABLET ORAL
Qty: 0 | Refills: 0 | Status: DISCONTINUED | OUTPATIENT
Start: 2017-06-16 | End: 2017-06-19

## 2017-06-16 RX ADMIN — HEPARIN SODIUM 5000 UNIT(S): 5000 INJECTION INTRAVENOUS; SUBCUTANEOUS at 21:31

## 2017-06-16 RX ADMIN — Medication 81 MILLIGRAM(S): at 12:41

## 2017-06-16 RX ADMIN — PANTOPRAZOLE SODIUM 40 MILLIGRAM(S): 20 TABLET, DELAYED RELEASE ORAL at 05:24

## 2017-06-16 RX ADMIN — Medication 20 MILLIGRAM(S): at 16:09

## 2017-06-16 RX ADMIN — HEPARIN SODIUM 5000 UNIT(S): 5000 INJECTION INTRAVENOUS; SUBCUTANEOUS at 16:09

## 2017-06-16 RX ADMIN — HEPARIN SODIUM 5000 UNIT(S): 5000 INJECTION INTRAVENOUS; SUBCUTANEOUS at 05:24

## 2017-06-16 RX ADMIN — Medication 40 MILLIEQUIVALENT(S): at 18:14

## 2017-06-16 NOTE — PROGRESS NOTE ADULT - ASSESSMENT
Patient is a 89 Mohawk speaking female with severe systolic CHF (s/p BiV AICD 2010 EF 25%), CKD-3 a/w CHF exacerbation and NORM on CKD-3 with hyponatremia and hypokalemia.

## 2017-06-16 NOTE — DISCHARGE NOTE ADULT - HOSPITAL COURSE
88 y/o Mohawk speaking F from home lives with daughter. PMH of severe systolic CHF (s/p BiV AICD 2010 EF 25%), DM (diet controlled) and HTN. Pt presented to the hospital due to progressive decline over the past several months who has been seen by cardiology for being acutely worse over last several days presents to the ED for progressively increased dyspnea on exertion, orthopnea, leg swelling and light headed feeling today. Pt states the light headed feeling started today which brought her to the ED, she takes Lasix 20mg 4 times a day without improvement. Pt denies fevers, chills, productive cough, abd pain, chest pain, nausea, vomiting, diarrhea, urinary symptoms or any other complaints.     GOD discussed with family who requested for patient to be DNR / DNI (as per pt's previously stated wishes to her family)     CXR showed Increased reticular/vascular markings now seen - right lung more than left - may be due to mild pulmonary vascular congestion. No focal lung consolidation. Cardiac enzymes were mildly elevated to .217 without elevation in CK and BNP was 09757. Patient was started on dobutamine and lasix drip. Lasix drip was discontinued. Dobutamine drip was discontinued and patient was switched to IV lasix. Patient had NORM with creatinine to 2.23 on admission. Nephro Dr. Ma/ Herman was consulted and creatinine continued to improved with diuresis. In addition, patient's shortness of breath improved since admission. Patient also had hyponatremia to 131 which improved with fluid restriction. Patient was persistently hypokalemic likely 2/2 diuretics requiring aggressive supplementation. 88 y/o Swedish speaking F from home lives with daughter. PMH of severe systolic CHF (s/p BiV AICD 2010 EF 25%), DM (diet controlled) and HTN. Pt presented to the hospital due to progressive decline over the past several months who has been seen by cardiology for being acutely worse over last several days presents to the ED for progressively increased dyspnea on exertion, orthopnea, leg swelling and light headed feeling today. Pt states the light headed feeling started today which brought her to the ED, she takes Lasix 20mg 4 times a day without improvement. Pt denies fevers, chills, productive cough, abd pain, chest pain, nausea, vomiting, diarrhea, urinary symptoms or any other complaints.     GOD discussed with family who requested for patient to be DNR / DNI (as per pt's previously stated wishes to her family)     CXR showed Increased reticular/vascular markings now seen - right lung more than left - may be due to mild pulmonary vascular congestion. No focal lung consolidation. Cardiac enzymes were mildly elevated to .217 without elevation in CK and BNP was 63883. Patient was started on dobutamine and lasix drip. Lasix drip was discontinued. Dobutamine drip was discontinued and patient was switched to IV Lasix Patient had NORM with creatinine to 2.23 on admission. Nephro Dr. Ma/ Herman was consulted and creatinine continued to improved with diuresis. In addition, patient's shortness of breath improved since admission. Patient also had hyponatremia to 131 which improved with fluid restriction. Patient was persistently hypokalemic likely 2/2 diuretics requiring aggressive supplementation.    Coreg was held while pt was on dobutamine drip. Patient not candidate for transplant and poor prognosis. Optimize heart failure with Lasix 40mg IV BID and then switched to PO Lasix Patient is clinically improved but creatinine level started rising again likely from heart failure and poor perfusion.  Losartan held for NORM too.  A1C level is 7 and pt has good control.   Discussed with cardiology attending and pt heart function was optimized and pt is medically stable to be discharged home. 89 y/o Persian speaking F from home lives with daughter. PMH of severe systolic CHF (s/p BiV AICD 2010 EF 25%), DM (diet controlled) and HTN. Pt presented to the hospital due to progressive decline over the past several months who has been seen by cardiology for being acutely worse over last several days presents to the ED for progressively increased dyspnea on exertion, orthopnea, leg swelling and light headed feeling today. Pt states the light headed feeling started today which brought her to the ED, she takes Lasix 20mg 4 times a day without improvement. Pt denies fevers, chills, productive cough, abd pain, chest pain, nausea, vomiting, diarrhea, urinary symptoms or any other complaints.     GOD discussed with family who requested for patient to be DNR / DNI (as per pt's previously stated wishes to her family)     CXR showed Increased reticular/vascular markings now seen - right lung more than left - may be due to mild pulmonary vascular congestion. No focal lung consolidation. Cardiac enzymes were mildly elevated to .217 without elevation in CK and BNP was 70147. Patient was started on dobutamine and lasix drip. Lasix drip was discontinued. Dobutamine drip was discontinued and patient was switched to IV Lasix Patient had NORM with creatinine to 2.23 on admission. Nephro Dr. Ma/ Herman was consulted and creatinine continued to improved with diuresis. In addition, patient's shortness of breath improved since admission. Patient also had hyponatremia to 131 which improved with fluid restriction. Patient was persistently hypokalemic likely 2/2 diuretics requiring aggressive supplementation.    Coreg was held while pt was on dobutamine drip. Patient not candidate for transplant and poor prognosis. Optimize heart failure with Lasix 40mg IV BID and then switched to PO Lasix Patient is clinically improved but creatinine level started rising again likely from heart failure and poor perfusion.  Losartan held for NORM too. Restarted on coreg and Lasix PO upon discharge.  A1C level is 7 and pt has good control.   Discussed with cardiology attending and pt heart function was optimized and pt is medically stable to be discharged home. Patient was intrusted to follow up cardiologist on Wednesday

## 2017-06-16 NOTE — PROGRESS NOTE ADULT - PROBLEM SELECTOR PLAN 1
Problem: NORM (acute kidney injury). Recommendation: Previous SCr 1.45 on 3/9/17  NORM on CKD-3 in the setting of CHF exacerbation; cardio-renal syndrome  Renal function improving with dobutamine assisted diuresis with Lasix gtt. Now off Lasix gtt.   Strict I/Os.  Avoid nephrotoxins/ NSAIDs/ RCA. Monitor BMP.

## 2017-06-16 NOTE — DISCHARGE NOTE ADULT - SECONDARY DIAGNOSIS.
NORM (acute kidney injury) History of hypertension Type 2 diabetes mellitus without complication, without long-term current use of insulin

## 2017-06-16 NOTE — DISCHARGE NOTE ADULT - NS AS DC HF EDUCATION INSTRUCTIONS
Activities as tolerated/Low salt diet/Monitor Weight Daily/Report weight gain of 2 or more pounds in one day or 3 or more pounds in one week, worsening shortness of breath, fatigue, weakness, increased swelling of hands and feet to primary care provider/Call Primary Care Provider for follow-up after discharge

## 2017-06-16 NOTE — PROGRESS NOTE ADULT - ASSESSMENT
88 y/o Singaporean speaking F from home lives with daughter. PMH of severe systolic CHF (s/p BiV AICD 2010 EF 25%), DM (diet controlled) and HTN who presented with progressively increased dyspnea on exertion, orthopnea, leg swelling who improved with lasix and dobutamine drip

## 2017-06-16 NOTE — PROGRESS NOTE ADULT - PROBLEM SELECTOR PLAN 1
BiV AICD 2010 EF 25%. BNP 75430  CXR w/ pulmonary vascular congestion   lasix drip d/c overnight  dobutamine drip d/c this afternoon  started on lasix 20mg IV bid  -c/w strict I & O   -c/w daily weights

## 2017-06-16 NOTE — DISCHARGE NOTE ADULT - MEDICATION SUMMARY - MEDICATIONS TO TAKE
I will START or STAY ON the medications listed below when I get home from the hospital:    aspirin 81 mg oral tablet  -- 1 tab(s) by mouth once a day  -- Indication: For Acute on chronic systolic congestive heart failure    Coreg 25 mg oral tablet  -- 1 tab(s) by mouth 2 times a day  -- Indication: For Acute on chronic systolic congestive heart failure    Lasix 20 mg oral tablet  -- 2 tab(s) by mouth 2 times a day  -- Indication: For Acute on chronic systolic congestive heart failure

## 2017-06-16 NOTE — PROGRESS NOTE ADULT - SUBJECTIVE AND OBJECTIVE BOX
Patient is a 89y old  Female who presents with a chief complaint of worsening shortness of breath (16 Jun 2017 10:50)    HPI:  88 y/o Omani speaking F from home lives with daughter. PMH of severe systolic CHF (s/p BiV AICD 2010 EF 25%), DM (diet controlled) and HTN who presented with progressively increased dyspnea on exertion, orthopnea, leg swelling who improved with lasix and dobutamine drip  __________________________  Overnight Events:    No acute overnight events  Patient clinically improving, with decreased SOB and decreased LE edema  __________________________  Vital Signs Last 24 Hrs  T(C): 36.4, Max: 36.7 (06-16 @ 07:56)  T(F): 97.5, Max: 98 (06-16 @ 07:56)  HR: 71 (60 - 85)  BP: 95/57 (83/54 - 117/56)  BP(mean): --  RR: 14 (14 - 18)  SpO2: 98% (96% - 100%)  __________________________    MEDICATIONS  (STANDING):  DOBUTamine Infusion 2.5MICROgram(s)/kG/Min IV Continuous <Continuous>  aspirin enteric coated 81milliGRAM(s) Oral daily  insulin lispro (HumaLOG) corrective regimen sliding scale  SubCutaneous three times a day before meals  dextrose 5%. 1000milliLiter(s) IV Continuous <Continuous>  dextrose 50% Injectable 12.5Gram(s) IV Push once  dextrose 50% Injectable 25Gram(s) IV Push once  dextrose 50% Injectable 25Gram(s) IV Push once  heparin  Injectable 5000Unit(s) SubCutaneous every 8 hours  pantoprazole    Tablet 40milliGRAM(s) Oral before breakfast  potassium chloride    Tablet ER 40milliEquivalent(s) Oral two times a day    MEDICATIONS  (PRN):  dextrose Gel 1Dose(s) Oral once PRN Blood Glucose LESS THAN 70 milliGRAM(s)/deciLiter  glucagon  Injectable 1milliGRAM(s) IntraMuscular once PRN Glucose <70 milliGRAM(s)/deciLiter    __________________________    Physical Exam  -------------------------------------------  General: NAD, sitting up in chair  Respiratory: crackles at lung bases b/l  CV: S1S2  Abdominal: Soft, NT, ND  MSK: +1 edema LE b/l, + peripheral pulses  __________________________                          12.8   4.8   )-----------( 166      ( 16 Jun 2017 07:21 )             38.4     06-16    133<L>  |  94<L>  |  78<H>  ----------------------------<  109<H>  3.2<L>   |  27  |  1.67<H>    Ca    8.9      16 Jun 2017 07:21  Phos  3.2     06-16  Mg     2.4     06-16    TPro  7.6  /  Alb  2.7<L>  /  TBili  1.8<H>  /  DBili  x   /  AST  26  /  ALT  13  /  AlkPhos  142<H>  06-16    __________________________  Assessment/Plan  -=-=-=-=-=-=-=-=-=-=-=-=-=-=-=-=-

## 2017-06-16 NOTE — PROGRESS NOTE ADULT - SUBJECTIVE AND OBJECTIVE BOX
[facility] NEPHROLOGY- PROGRESS NOTE    Patient is a 89 Indian speaking female with severe systolic CHF (s/p BiV AICD 2010 EF 25%), CKD-3 a/w CHF exacerbation and NORM on CKD-3 with hyponatremia and hypokalemia.     ROS:   Pt stages her breathing has improved. Denies any c/p, n/v/d or dysuria. LE edema- improving.     Hospital Medications: Reviewd:    VITALS:  T(F): 97.5, Max: 98 (06-16 @ 07:56)  HR: 71  BP: 95/57  RR: 14  SpO2: 98%  Wt(kg): --  I & Os for 24h ending 06-16 @ 07:00  =============================================  IN: 350 ml / OUT: 1600 ml / NET: -1250 ml    I & Os for current day (as of 06-16 @ 14:14)  =============================================  IN: 256.5 ml / OUT: 0 ml / NET: 256.5 ml      PHYSICAL EXAM:  Gen: NAD, calm  HEENT: MMM  Neck: no JVD  Cards: RRR, +S1/S2, no M/G/R  Resp: mild bi-basilar rales R>L  GI: soft, NT/ND, NABS  : no CVA tenderness  Extremities: +2 LE edema B/L- improving  Derm: no rashes  Neuro: non-focal    LABS:  06-16    133<L>  |  94<L>  |  78<H>  ----------------------------<  109<H>  3.2<L>   |  27  |  1.67<H>    Ca    8.9      16 Jun 2017 07:21  Phos  3.2     06-16  Mg     2.4     06-16    TPro  7.6  /  Alb  2.7<L>  /  TBili  1.8<H>  /  DBili      /  AST  26  /  ALT  13  /  AlkPhos  142<H>  06-16    Creatinine Trend: 1.67 <--, 1.79 <--, 2.23 <--                        12.8   4.8   )-----------( 166      ( 16 Jun 2017 07:21 )             38.4     Urine Studies:    Sodium, Random Urine: 92 mmol/L (06-15 @ 18:23)  Osmolality, Random Urine: 352 mos/kg (06-15 @ 18:23)

## 2017-06-16 NOTE — DISCHARGE NOTE ADULT - PLAN OF CARE
to control blood pressure to control blood sugar levels to prevent harm to kidneys and control electrolytes to prevent shortness of breath and leg swelling A1C level is 7 and pt has good control. Follow up nephrologist in a week with repeat creatinine level. Hold losartan for now. Continue Lasix as prescribed. Continue Lasix, Continue Lasix and coreg as prescribed. Hold losartan. Monitor BMP and restart losartan outpatient if renal function improved. Continue Lasix, and coreg. Patient BP in low normal range. Continue Lasix and coreg as prescribed. Hold losartan. Monitor BMP and restart losartan outpatient if renal function improved. Follow up with cardiologist Dr Gomes on wended.

## 2017-06-16 NOTE — DISCHARGE NOTE ADULT - PATIENT PORTAL LINK FT
“You can access the Follow[employer] Patient Portal, offered by [facility], by registering with the following website: [email or web address]”

## 2017-06-16 NOTE — DISCHARGE NOTE ADULT - CARE PLAN
Principal Discharge DX:	Acute on chronic systolic congestive heart failure  Goal:	to prevent shortness of breath and leg swelling  Secondary Diagnosis:	NORM (acute kidney injury)  Goal:	to prevent harm to kidneys and control electrolytes  Secondary Diagnosis:	History of hypertension  Goal:	to control blood pressure  Secondary Diagnosis:	Type 2 diabetes mellitus without complication, without long-term current use of insulin  Goal:	to control blood sugar levels Principal Discharge DX:	Acute on chronic systolic congestive heart failure  Goal:	to prevent shortness of breath and leg swelling  Instructions for follow-up, activity and diet:	Continue Lasix as prescribed.  Secondary Diagnosis:	NORM (acute kidney injury)  Goal:	to prevent harm to kidneys and control electrolytes  Instructions for follow-up, activity and diet:	Follow up nephrologist in a week with repeat creatinine level. Hold losartan for now.  Secondary Diagnosis:	History of hypertension  Goal:	to control blood pressure  Instructions for follow-up, activity and diet:	Continue Lasix,  Secondary Diagnosis:	Type 2 diabetes mellitus without complication, without long-term current use of insulin  Goal:	to control blood sugar levels  Instructions for follow-up, activity and diet:	A1C level is 7 and pt has good control. Principal Discharge DX:	Acute on chronic systolic congestive heart failure  Goal:	to prevent shortness of breath and leg swelling  Instructions for follow-up, activity and diet:	Continue Lasix and coreg as prescribed. Hold losartan. Monitor BMP and restart losartan outpatient if renal function improved.  Secondary Diagnosis:	NORM (acute kidney injury)  Goal:	to prevent harm to kidneys and control electrolytes  Instructions for follow-up, activity and diet:	Follow up nephrologist in a week with repeat creatinine level. Hold losartan for now.  Secondary Diagnosis:	History of hypertension  Goal:	to control blood pressure  Instructions for follow-up, activity and diet:	Continue Lasix,  Secondary Diagnosis:	Type 2 diabetes mellitus without complication, without long-term current use of insulin  Goal:	to control blood sugar levels  Instructions for follow-up, activity and diet:	A1C level is 7 and pt has good control. Principal Discharge DX:	Acute on chronic systolic congestive heart failure  Goal:	to prevent shortness of breath and leg swelling  Instructions for follow-up, activity and diet:	Continue Lasix and coreg as prescribed. Hold losartan. Monitor BMP and restart losartan outpatient if renal function improved. Follow up with cardiologist Dr Mireya ortiz.  Secondary Diagnosis:	NORM (acute kidney injury)  Goal:	to prevent harm to kidneys and control electrolytes  Instructions for follow-up, activity and diet:	Follow up nephrologist in a week with repeat creatinine level. Hold losartan for now.  Secondary Diagnosis:	History of hypertension  Goal:	to control blood pressure  Instructions for follow-up, activity and diet:	Continue Lasix, and coreg. Patient BP in low normal range.  Secondary Diagnosis:	Type 2 diabetes mellitus without complication, without long-term current use of insulin  Goal:	to control blood sugar levels  Instructions for follow-up, activity and diet:	A1C level is 7 and pt has good control.

## 2017-06-16 NOTE — PHYSICAL THERAPY INITIAL EVALUATION ADULT - IMPAIRMENTS FOUND, PT EVAL
joint integrity and mobility/ROM/muscle strength/aerobic capacity/endurance/gross motor/gait, locomotion, and balance

## 2017-06-16 NOTE — PROGRESS NOTE ADULT - ATTENDING COMMENTS
[facility] NEPHROLOGY  Davonte Ma M.D.  Markell Buckner D.O.  Carina Sutton M.D.  Kylie Mack, MSN, ANP-C  [phone]    [address] [place] [address]

## 2017-06-16 NOTE — DISCHARGE NOTE ADULT - CARE PROVIDER_API CALL
Gary Gomes (MADELINE), Cardiology  [address] [address] [place] [address]  Phone: [phone]  Fax: [phone]

## 2017-06-16 NOTE — CONSULT NOTE ADULT - ASSESSMENT
1) Onychocryptosis b/l  2) Onychomycosis x 10  3) PVD  4)Xerosis      PLANS:  Examined patients, discussed treatment options with patient family. Performed aseptic debridement of all toenails without complications. Educated patient on proper foot care. Follow up with podiatrist out patient when discharged. Antifungal medication topical out-patient

## 2017-06-16 NOTE — CONSULT NOTE ADULT - SUBJECTIVE AND OBJECTIVE BOX
[facility] NEPHROLOGY- CONSULTATION NOTE    Patient is a 89 Portuguese speaking female with severe systolic CHF (s/p BiV AICD 2010 EF 25%), DM (diet controlled) and HTN p/w SOB, LEWIS, Orthopnea and LE swelling x 2 weeks. Pt a/w CHF exacerbation and NORM on CKD-3. Pt's grandson and daughter at bedside. History taken from grandson. Pt with known CKD. Saw a nephrologist 2 months ago; unaware of name and stated that her kidney function is ok for her age.   Pt denies fevers, chills, productive cough, abd pain, chest pain, nausea, vomiting, diarrhea, urinary symptoms or any other complaints.     Patient denies any NSAID use, recent CT with contrast, hepatitis or blood transfusions.     PAST MEDICAL & SURGICAL HISTORY:  Type 2 diabetes mellitus without complication, without long-term current use of insulin  Acute on chronic systolic congestive heart failure  Peripheral Vascular Disease  Mitral Regurgitation  Cardiomyopathy  History of Hypertension  PVD (Peripheral Vascular Disease)  HTN (Hypertension)  Artificial pacemaker  No significant past surgical history    No Known Allergies    Home Medications Reviewed  Hospital Medications:   MEDICATIONS  (STANDING):  furosemide Infusion 5mG/Hr IV Continuous <Continuous>  DOBUTamine Infusion 2.5MICROgram(s)/kG/Min IV Continuous <Continuous>  aspirin enteric coated 81milliGRAM(s) Oral daily  insulin lispro (HumaLOG) corrective regimen sliding scale  SubCutaneous three times a day before meals  dextrose 5%. 1000milliLiter(s) IV Continuous <Continuous>  dextrose 50% Injectable 12.5Gram(s) IV Push once  dextrose 50% Injectable 25Gram(s) IV Push once  dextrose 50% Injectable 25Gram(s) IV Push once  heparin  Injectable 5000Unit(s) SubCutaneous every 8 hours  pantoprazole    Tablet 40milliGRAM(s) Oral before breakfast  potassium chloride    Tablet ER 40milliEquivalent(s) Oral every 4 hours    SOCIAL HISTORY:  Denies ETOh,Smoking,   FAMILY HISTORY:  No pertinent family history in first degree relatives      REVIEW OF SYSTEMS:  Gen: increased weight  HEENT: no rhinorrhea  Neck: no sore throat  Cards: no chest pain  Resp: +SOB +LEWIS  GI: no nausea or vomiting or diarrhea  : no dysuria or hematuria  Vascular: +LE edema  Derm: no rashes  Neuro: no numbness/tingling  All other review of systems is negative unless indicated above.    VITALS:  T(F): 98.1, Max: 98.1 (06-15 @ 11:20)  HR: 70  BP: 108/61  RR: 17  SpO2: 97%  Wt(kg): --  I & Os for 24h ending 06-15 @ 07:00  =============================================  IN: 0 ml / OUT: 1150 ml / NET: -1150 ml    I & Os for current day (as of 06-15 @ 13:10)  =============================================  IN: 0 ml / OUT: 200 ml / NET: -200 ml    Height (cm): 152.4 (06-14 @ 16:59)  Weight (kg): 62 (06-14 @ 16:59)  BMI (kg/m2): 26.7 (06-14 @ 16:59)  BSA (m2): 1.59 (06-14 @ 16:59)    PHYSICAL EXAM:  Gen: NAD, calm  HEENT: MMM  Neck: no JVD  Cards: RRR, +S1/S2, no M/G/R  Resp: mild bi-basilar rales  GI: soft, NT/ND, NABS  : no CVA tenderness  Extremities: +2-3 LE edema B/L  Derm: no rashes  Neuro: non-focal    LABS:  06-15    133<L>  |  95<L>  |  86<H>  ----------------------------<  110<H>  3.1<L>   |  27  |  1.79<H>    Ca    9.4      15 Feliciano 2017 08:11  Phos  3.7     06-15  Mg     2.4     06-15    TPro  7.6  /  Alb  2.8<L>  /  TBili  1.8<H>  /  DBili      /  AST  25  /  ALT  15  /  AlkPhos  149<H>  06-15    Creatinine Trend: 1.79 <--, 2.23 <--                        12.6   5.4   )-----------( 180      ( 15 Feliciano 2017 08:11 )             39.0     Urine Studies:      RADIOLOGY & ADDITIONAL STUDIES:
89 y/o Khmer speaking F from home lives with daughter. PMH of severe systolic CHF (s/p BiV AICD 2010 EF 25%), DM (diet controlled) and HTN. Pt presented to the hospital due to progressive decline over the past several months who has been seen by cardiology for being acutely worse over last several days presents to the ED for progressively increased dyspnea on exertion, orthopnea, leg swelling and light headed feeling today. Consulted for mycotic long toenails with occasional discomfort. Denies any trauma      Allergies and Intolerances:        Allergies:  	No Known Allergies:     Home Medications:   * Patient Currently Takes Medications as of 09-Mar-2017 12:30 documented in Prescription Writer  · 	ipratropium CFC free 17 mcg/inh inhalation aerosol: 2 puff(s) inhaled every 6 hours to help with breathing  · 	Ventolin HFA 90 mcg/inh inhalation aerosol: 2 puff(s) inhaled every 4 hours, As Needed- for shortness of breath and/or wheezing  · 	azithromycin 250 mg oral tablet: 1 tab(s) orally once a day x 4 days  · 	predniSONE 50 mg oral tablet: 1 tab(s) orally once a day to improve breathing    . .    Patient History:   Past Medical History:  Acute on chronic systolic congestive heart failure    Cardiomyopathy    History of Hypertension    Mitral Regurgitation    Peripheral Vascular Disease    PVD (Peripheral Vascular Disease)    Type 2 diabetes mellitus without complication, without long-term current use of insulin.    Past Surgical History:  Artificial pacemaker.    Family History:  No pertinent family history in first degree relatives.    Tobacco Screening:  · Core Measure Site	Yes	  · Has the patient used tobacco in the past 30 days?	No	        PE: 89 y/o female awake and alert in NAD    Temperature   Temp (F) : 97.6 Degrees F  Temperature  Temp (C) Temp (C) : 36.4 Degrees C  Temperature  Temp site Temp Site : oral  Heart Rate  Heart Rate Heart Rate (beats/min) : 54 /min  Heart Rate  Heart Rate Method : noninvasive blood pressure monitor  Heart Rate   Heart Rate Rhythm : radial pulse regular  Noninvasive Blood Pressure  BP Systolic Systolic : 102 mm Hg  Noninvasive Blood Pressure  BP Diastolic Diastolic (mm Hg) : 61 mm Hg  Noninvasive Blood Pressure  Blood Pressure - Site Site : left upper arm  Noninvasive Blood Pressure  Blood Pressure - Method Method : electronic  Respiratory/Pulse Oximetry  Respiration Rate (breaths/min) Respiration Rate (breaths/min) : 17 /min  Respiratory/Pulse Oximetry  SpO2 (%) SpO2 (%) : 95 %  Respiratory/Pulse Oximetry  O2 delivery Patient On : room air      LOWER EXTREMITY EXAM:  Derm:  Skin is warm and dry. No open wounds, no erythema. Nails are elongated, thickened and dystrophic x 10. Incurvated hallux nail borders. Hyperkeratotic lesions distal 3rd toes bilateral    VASC: DP/PT 1/4 bilateral, no edema noted bilateral LE    NEURO: Protective sensation WNL bilateral foot    M/S: Dorsally contracted digits 2-5 bilateral, Laterally deviated hallux bilateral    LABS:                  12.8   4.8   )-----------( 166      ( 16 Jun 2017 07:21 )             38.4     06-16    133<L>  |  94<L>  |  78<H>  ----------------------------<  109<H>  3.2<L>   |  27  |  1.67<H>    Ca    8.9      16 Jun 2017 07:21  Phos  3.2     06-16  Mg     2.4     06-16

## 2017-06-16 NOTE — PROGRESS NOTE ADULT - PROBLEM SELECTOR PLAN 4
hypervolemic hyponatremia. Stable serum Na. Consider restricting free water <800ml/ day. Monitor serum sodium.

## 2017-06-16 NOTE — DISCHARGE NOTE ADULT - MEDICATION SUMMARY - MEDICATIONS TO STOP TAKING
I will STOP taking the medications listed below when I get home from the hospital:    predniSONE 50 mg oral tablet  -- 1 tab(s) by mouth once a day to improve breathing  -- It is very important that you take or use this exactly as directed.  Do not skip doses or discontinue unless directed by your doctor.  Obtain medical advice before taking any non-prescription drugs as some may affect the action of this medication.  Take with food or milk.    azithromycin 250 mg oral tablet  -- 1 tab(s) by mouth once a day x 4 days  -- Do not take dairy products, antacids, or iron preparations within one hour of this medication.  Finish all this medication unless otherwise directed by prescriber.    Lasix 20 mg oral tablet  -- 1 tab(s) by mouth 4 times a day    losartan-hydrochlorothiazide 100mg-25mg oral tablet  -- 1 tab(s) by mouth once a day

## 2017-06-17 LAB
ANION GAP SERPL CALC-SCNC: 11 MMOL/L — SIGNIFICANT CHANGE UP (ref 5–17)
BUN SERPL-MCNC: 72 MG/DL — HIGH (ref 7–18)
CALCIUM SERPL-MCNC: 9 MG/DL — SIGNIFICANT CHANGE UP (ref 8.4–10.5)
CHLORIDE SERPL-SCNC: 95 MMOL/L — LOW (ref 96–108)
CO2 SERPL-SCNC: 28 MMOL/L — SIGNIFICANT CHANGE UP (ref 22–31)
CREAT SERPL-MCNC: 1.81 MG/DL — HIGH (ref 0.5–1.3)
GLUCOSE SERPL-MCNC: 145 MG/DL — HIGH (ref 70–99)
POTASSIUM SERPL-MCNC: 3.7 MMOL/L — SIGNIFICANT CHANGE UP (ref 3.5–5.3)
POTASSIUM SERPL-SCNC: 3.7 MMOL/L — SIGNIFICANT CHANGE UP (ref 3.5–5.3)
SODIUM SERPL-SCNC: 134 MMOL/L — LOW (ref 135–145)

## 2017-06-17 RX ADMIN — Medication 1: at 18:05

## 2017-06-17 RX ADMIN — PANTOPRAZOLE SODIUM 40 MILLIGRAM(S): 20 TABLET, DELAYED RELEASE ORAL at 12:36

## 2017-06-17 RX ADMIN — HEPARIN SODIUM 5000 UNIT(S): 5000 INJECTION INTRAVENOUS; SUBCUTANEOUS at 21:09

## 2017-06-17 RX ADMIN — HEPARIN SODIUM 5000 UNIT(S): 5000 INJECTION INTRAVENOUS; SUBCUTANEOUS at 05:25

## 2017-06-17 RX ADMIN — HEPARIN SODIUM 5000 UNIT(S): 5000 INJECTION INTRAVENOUS; SUBCUTANEOUS at 15:20

## 2017-06-17 RX ADMIN — Medication 81 MILLIGRAM(S): at 12:37

## 2017-06-17 RX ADMIN — Medication 20 MILLIGRAM(S): at 05:26

## 2017-06-17 RX ADMIN — Medication 20 MILLIGRAM(S): at 18:06

## 2017-06-17 NOTE — PROGRESS NOTE ADULT - SUBJECTIVE AND OBJECTIVE BOX
[facility] NEPHROLOGY- PROGRESS NOTE    Patient is a 89 Finnish speaking female with severe systolic CHF (s/p BiV AICD 2010 EF 25%), CKD-3 a/w CHF exacerbation and NORM on CKD-3 with hyponatremia and hypokalemia.     ROS:   Pt stages her breathing has improved. Denies any c/p, n/v/d or dysuria. LE edema- improving. Decreased appetite    Hospital Medications: Reviewd:    VITALS:  T(F): 97.2, Max: 98 (06-17 @ 08:17)  HR: 69  BP: 98/65  RR: 17  SpO2: 100%  Wt(kg): --    I & Os for current day (as of 06-17 @ 17:02)  =============================================  IN: 443.8 ml / OUT: 850 ml / NET: -406.2 ml      PHYSICAL EXAM:  Gen: NAD, calm  HEENT: MMM  Neck: no JVD  Cards: RRR, +S1/S2, no M/G/R  Resp: mild bi-basilar rales R>L  GI: soft, NT/ND, NABS  : no CVA tenderness  Extremities: +2 LE edema B/L  Derm: no rashes  Neuro: non-focal    LABS:  06-17    134<L>  |  95<L>  |  72<H>  ----------------------------<  145<H>  3.7   |  28  |  1.81<H>    Ca    9.0      17 Jun 2017 07:23  Phos  3.2     06-16  Mg     2.4     06-16    TPro  7.6  /  Alb  2.7<L>  /  TBili  1.8<H>  /  DBili      /  AST  26  /  ALT  13  /  AlkPhos  142<H>  06-16    Creatinine Trend: 1.81 <--, 1.81 <--, 1.67 <--, 1.79 <--, 2.23 <--                        12.8   4.8   )-----------( 166      ( 16 Jun 2017 07:21 )             38.4     Urine Studies:    Sodium, Random Urine: 92 mmol/L (06-15 @ 18:23)  Osmolality, Random Urine: 352 mos/kg (06-15 @ 18:23)

## 2017-06-17 NOTE — PROGRESS NOTE ADULT - ASSESSMENT
Patient is a 89 Faroese speaking female with severe systolic CHF (s/p BiV AICD 2010 EF 25%), CKD-3 a/w CHF exacerbation and NORM on CKD-3 with hyponatremia and hypokalemia.

## 2017-06-17 NOTE — PROGRESS NOTE ADULT - ATTENDING COMMENTS
[facility] NEPHROLOGY  Davonte Ma M.D.  Markell Buckner D.O.  Carina Sutton M.D.  Kylie Mack, MSN, ANP-C  [phone]    [address] [place] [address]

## 2017-06-17 NOTE — PROGRESS NOTE ADULT - PROBLEM SELECTOR PLAN 4
hypervolemic hyponatremia. Mild improvement in serum Na. Continue restricting free water <1L / day. Monitor serum sodium.

## 2017-06-17 NOTE — PROGRESS NOTE ADULT - SUBJECTIVE AND OBJECTIVE BOX
SUBJ:88 y/o Lao speaking F from home lives with daughter. PMH of severe systolic CHF (s/p BiV AICD 2010 EF 25%), DM (diet controlled) and HTN who presented with progressively increased dyspnea on exertion, orthopnea, leg swelling who improved with lasix and dobutamine drip.Off all drips.poor appetite,      MEDICATIONS  (STANDING):  aspirin enteric coated 81milliGRAM(s) Oral daily  insulin lispro (HumaLOG) corrective regimen sliding scale  SubCutaneous three times a day before meals  heparin  Injectable 5000Unit(s) SubCutaneous every 8 hours  pantoprazole    Tablet 40milliGRAM(s) Oral before breakfast  furosemide   Injectable 20milliGRAM(s) IV Push two times a day    Vital Signs Last 24 Hrs  T(C): 36.7, Max: 36.7 (06-17 @ 08:17)  T(F): 98, Max: 98 (06-17 @ 08:17)  HR: 88 (54 - 88)  BP: 105/72 (97/65 - 105/72)  RR: 18 (17 - 18)  SpO2: 97% (95% - 100%)      PHYSICAL EXAM:  · CONSTITUTIONAL:	Well-developed, well nourished    BMI-26  · EYES:	EOMI; PERRL; no drainage or redness  · ENMT 	No oral lesions; no gross abnormalities  · NECK:	No bruits; no thyromegaly or nodules no JVD  ·BACK: 	No deformity or limitation of movement  ·RESPIRATORY:   airway patent; breath sounds equal; good air movement; respirations non-labored; clear to auscultation bilaterally; no chest wall tenderness; no intercostal retractions; no rales,rhonchi or wheeze  · CARDIOVASCULAR	regular rate and rhythm  no rub  2/6 systolic  murmur  normal PMI  . GASTROINTESTINAL:  no distention; no masses palpable; bowel sounds normal; no rebound tenderness; no guarding; no rigidity; no organomegaly  · EXTREMITIES: No cyanosis, clubbing  less leg edema  · VASCULAR: 	Equal and normal pulses (carotid, femoral, dorsalis pedis)  ·NEUROLOGICAL:   alert and oriented x 3; sensation intact; deep reflexes intact; cranial nerves intact; no spontaneous movement; superficial reflexes intact; normal strength  · SKIN:	No lesions; no rash  . LYMPH NODES:	No lymphadedenopathy  · MUSCULOSKELETAL:   No calf tenderness  no joint swelling	  LABS:                        12.8   4.8   )-----------( 166      ( 16 Jun 2017 07:21 )             38.4     06-17    134<L>  |  95<L>  |  72<H>  ----------------------------<  145<H>  3.7   |  28  |  1.81<H>    Ca    9.0      17 Jun 2017 07:23  Phos  3.2     06-16  Mg     2.4     06-16    TPro  7.6  /  Alb  2.7<L>  /  TBili  1.8<H>  /  DBili  x   /  AST  26  /  ALT  13  /  AlkPhos  142<H>  06-16            I&O's Summary    I & Os for current day (as of 17 Jun 2017 11:54)  =============================================  IN: 443.8 ml / OUT: 850 ml / NET: -406.2 ml    IMPRESSION AND PLAN:Problem/Plan - 1:  ·  Problem: Acute systolic congestive heart failure.HFrEF  s/p BiV AICD 2010 EF 25%. BNP 99308  CXR w/ pulmonary vascular congestion   started on lasix 20mg IV bid  -c/w strict I & O   -c/w daily weights.     Problem/Plan - 2:  ·  Problem: NORM (acute kidney injury).  Plan: NORM on CKD  creatinine improving with diuresis  f/u BMP  Jun Ma/ Herman.     Problem/Plan - 3:  ·  Problem: Type 2 diabetes mellitus without complication, without long-term current use of insulin.  Plan: c/w HSS   monitor accuchecks

## 2017-06-17 NOTE — PROGRESS NOTE ADULT - PROBLEM SELECTOR PLAN 1
Previous SCr 1.45 on 3/9/17  NORM on CKD-3 in the setting of CHF exacerbation; cardio-renal syndrome  Renal function improved with dobutamine assisted diuresis with Lasix gtt. Now off Dobutamine & Lasix gtt and only Lasix 20mg IV bid with mild elevation in SCr.   f/u Cardiology. Consider restarting dobutamine gtt.   Strict I/Os.  Avoid nephrotoxins/ NSAIDs/ RCA. Monitor BMP.

## 2017-06-18 LAB
ANION GAP SERPL CALC-SCNC: 12 MMOL/L — SIGNIFICANT CHANGE UP (ref 5–17)
BUN SERPL-MCNC: 67 MG/DL — HIGH (ref 7–18)
CALCIUM SERPL-MCNC: 9.2 MG/DL — SIGNIFICANT CHANGE UP (ref 8.4–10.5)
CHLORIDE SERPL-SCNC: 96 MMOL/L — SIGNIFICANT CHANGE UP (ref 96–108)
CO2 SERPL-SCNC: 27 MMOL/L — SIGNIFICANT CHANGE UP (ref 22–31)
CREAT SERPL-MCNC: 1.67 MG/DL — HIGH (ref 0.5–1.3)
GLUCOSE SERPL-MCNC: 123 MG/DL — HIGH (ref 70–99)
POTASSIUM SERPL-MCNC: 3.6 MMOL/L — SIGNIFICANT CHANGE UP (ref 3.5–5.3)
POTASSIUM SERPL-SCNC: 3.6 MMOL/L — SIGNIFICANT CHANGE UP (ref 3.5–5.3)
SODIUM SERPL-SCNC: 135 MMOL/L — SIGNIFICANT CHANGE UP (ref 135–145)

## 2017-06-18 RX ADMIN — HEPARIN SODIUM 5000 UNIT(S): 5000 INJECTION INTRAVENOUS; SUBCUTANEOUS at 21:14

## 2017-06-18 RX ADMIN — Medication 20 MILLIGRAM(S): at 05:06

## 2017-06-18 RX ADMIN — HEPARIN SODIUM 5000 UNIT(S): 5000 INJECTION INTRAVENOUS; SUBCUTANEOUS at 05:01

## 2017-06-18 RX ADMIN — HEPARIN SODIUM 5000 UNIT(S): 5000 INJECTION INTRAVENOUS; SUBCUTANEOUS at 14:56

## 2017-06-18 RX ADMIN — Medication 1: at 12:15

## 2017-06-18 RX ADMIN — Medication 20 MILLIGRAM(S): at 17:33

## 2017-06-18 RX ADMIN — PANTOPRAZOLE SODIUM 40 MILLIGRAM(S): 20 TABLET, DELAYED RELEASE ORAL at 06:19

## 2017-06-18 RX ADMIN — Medication 81 MILLIGRAM(S): at 12:15

## 2017-06-18 NOTE — PROGRESS NOTE ADULT - ATTENDING COMMENTS
[facility] NEPHROLOGY  Davonte Ma M.D.  Markell Buckner D.O.  Carina Sutton M.D.  Kylie Mack, MSN, ANP-C  [phone]    [address] [place] [address]

## 2017-06-18 NOTE — PROGRESS NOTE ADULT - PROBLEM SELECTOR PLAN 1
Previous SCr 1.45 on 3/9/17  NORM on CKD-3 in the setting of CHF exacerbation; cardio-renal syndrome  s/p dobutamine assisted diuresis with Lasix gtt. Renal function improving. Pt now on IV lasix as per cards. Strict I/Os   f/u Cardiology. Avoid nephrotoxins/ NSAIDs/ RCA. Monitor BMP.

## 2017-06-18 NOTE — PROGRESS NOTE ADULT - SUBJECTIVE AND OBJECTIVE BOX
[facility] NEPHROLOGY- PROGRESS NOTE    Patient is a 89 Chilean speaking female with severe systolic CHF (s/p BiV AICD 2010 EF 25%), CKD-3 a/w CHF exacerbation and NORM on CKD-3 with hyponatremia and hypokalemia.     ROS:   Pt stages her breathing has improved. Denies any c/p, n/v/d or dysuria. LE edema- improving. Decreased appetite + cough    Hospital Medications: Reviewd:    VITALS:  T(F): 97.9, Max: 97.9 (06-18 @ 05:11)  HR: 70  BP: 98/70  RR: 18  SpO2: 98%  Wt(kg): --    PHYSICAL EXAM:  Gen: NAD, calm  Cards: RRR, +S1/S2, no M/G/R  Resp: mild bi-basilar rales-improving  GI: soft, NT/ND, NABS  : no CVA tenderness  Extremities: +2 LE edema B/L    LABS:  06-18    135  |  96  |  67<H>  ----------------------------<  123<H>  3.6   |  27  |  1.67<H>    Ca    9.2      18 Jun 2017 07:34      Creatinine Trend: 1.67 <--, 1.81 <--, 1.81 <--, 1.67 <--, 1.79 <--, 2.23 <--    Urine Studies:    Sodium, Random Urine: 92 mmol/L (06-15 @ 18:23)  Osmolality, Random Urine: 352 mos/kg (06-15 @ 18:23)

## 2017-06-18 NOTE — PROGRESS NOTE ADULT - ASSESSMENT
Patient is a 89 Chinese speaking female with severe systolic CHF (s/p BiV AICD 2010 EF 25%), CKD-3 a/w CHF exacerbation and NORM on CKD-3 with hyponatremia and hypokalemia.

## 2017-06-18 NOTE — PROGRESS NOTE ADULT - PROBLEM SELECTOR PLAN 4
hypervolemic hyponatremia- resolved. Continue restricting free water <1L / day. Monitor serum sodium.

## 2017-06-19 LAB
ANION GAP SERPL CALC-SCNC: 12 MMOL/L — SIGNIFICANT CHANGE UP (ref 5–17)
BASOPHILS # BLD AUTO: 0 K/UL — SIGNIFICANT CHANGE UP (ref 0–0.2)
BASOPHILS NFR BLD AUTO: 0.8 % — SIGNIFICANT CHANGE UP (ref 0–2)
BUN SERPL-MCNC: 61 MG/DL — HIGH (ref 7–18)
CALCIUM SERPL-MCNC: 9.1 MG/DL — SIGNIFICANT CHANGE UP (ref 8.4–10.5)
CHLORIDE SERPL-SCNC: 96 MMOL/L — SIGNIFICANT CHANGE UP (ref 96–108)
CO2 SERPL-SCNC: 30 MMOL/L — SIGNIFICANT CHANGE UP (ref 22–31)
CREAT SERPL-MCNC: 1.56 MG/DL — HIGH (ref 0.5–1.3)
EOSINOPHIL # BLD AUTO: 0.2 K/UL — SIGNIFICANT CHANGE UP (ref 0–0.5)
EOSINOPHIL NFR BLD AUTO: 2.7 % — SIGNIFICANT CHANGE UP (ref 0–6)
GLUCOSE SERPL-MCNC: 107 MG/DL — HIGH (ref 70–99)
HCT VFR BLD CALC: 41.1 % — SIGNIFICANT CHANGE UP (ref 34.5–45)
HGB BLD-MCNC: 13.2 G/DL — SIGNIFICANT CHANGE UP (ref 11.5–15.5)
LYMPHOCYTES # BLD AUTO: 1.3 K/UL — SIGNIFICANT CHANGE UP (ref 1–3.3)
LYMPHOCYTES # BLD AUTO: 22.1 % — SIGNIFICANT CHANGE UP (ref 13–44)
MAGNESIUM SERPL-MCNC: 2.3 MG/DL — SIGNIFICANT CHANGE UP (ref 1.6–2.6)
MCHC RBC-ENTMCNC: 32.2 GM/DL — SIGNIFICANT CHANGE UP (ref 32–36)
MCHC RBC-ENTMCNC: 32.8 PG — SIGNIFICANT CHANGE UP (ref 27–34)
MCV RBC AUTO: 101.9 FL — HIGH (ref 80–100)
MONOCYTES # BLD AUTO: 0.9 K/UL — SIGNIFICANT CHANGE UP (ref 0–0.9)
MONOCYTES NFR BLD AUTO: 15.8 % — HIGH (ref 2–14)
NEUTROPHILS # BLD AUTO: 3.4 K/UL — SIGNIFICANT CHANGE UP (ref 1.8–7.4)
NEUTROPHILS NFR BLD AUTO: 58.6 % — SIGNIFICANT CHANGE UP (ref 43–77)
PHOSPHATE SERPL-MCNC: 2.4 MG/DL — LOW (ref 2.5–4.5)
PLATELET # BLD AUTO: 180 K/UL — SIGNIFICANT CHANGE UP (ref 150–400)
POTASSIUM SERPL-MCNC: 3.5 MMOL/L — SIGNIFICANT CHANGE UP (ref 3.5–5.3)
POTASSIUM SERPL-SCNC: 3.5 MMOL/L — SIGNIFICANT CHANGE UP (ref 3.5–5.3)
RBC # BLD: 4.04 M/UL — SIGNIFICANT CHANGE UP (ref 3.8–5.2)
RBC # FLD: 14.3 % — SIGNIFICANT CHANGE UP (ref 10.3–14.5)
SODIUM SERPL-SCNC: 138 MMOL/L — SIGNIFICANT CHANGE UP (ref 135–145)
WBC # BLD: 5.8 K/UL — SIGNIFICANT CHANGE UP (ref 3.8–10.5)
WBC # FLD AUTO: 5.8 K/UL — SIGNIFICANT CHANGE UP (ref 3.8–10.5)

## 2017-06-19 PROCEDURE — 71010: CPT | Mod: 26

## 2017-06-19 RX ORDER — POTASSIUM PHOSPHATE, MONOBASIC POTASSIUM PHOSPHATE, DIBASIC 236; 224 MG/ML; MG/ML
15 INJECTION, SOLUTION INTRAVENOUS ONCE
Qty: 0 | Refills: 0 | Status: COMPLETED | OUTPATIENT
Start: 2017-06-19 | End: 2017-06-19

## 2017-06-19 RX ORDER — FUROSEMIDE 40 MG
20 TABLET ORAL EVERY 8 HOURS
Qty: 0 | Refills: 0 | Status: DISCONTINUED | OUTPATIENT
Start: 2017-06-19 | End: 2017-06-20

## 2017-06-19 RX ADMIN — HEPARIN SODIUM 5000 UNIT(S): 5000 INJECTION INTRAVENOUS; SUBCUTANEOUS at 06:11

## 2017-06-19 RX ADMIN — HEPARIN SODIUM 5000 UNIT(S): 5000 INJECTION INTRAVENOUS; SUBCUTANEOUS at 13:12

## 2017-06-19 RX ADMIN — Medication 20 MILLIGRAM(S): at 06:11

## 2017-06-19 RX ADMIN — POTASSIUM PHOSPHATE, MONOBASIC POTASSIUM PHOSPHATE, DIBASIC 62.5 MILLIMOLE(S): 236; 224 INJECTION, SOLUTION INTRAVENOUS at 17:21

## 2017-06-19 RX ADMIN — Medication 20 MILLIGRAM(S): at 21:23

## 2017-06-19 RX ADMIN — PANTOPRAZOLE SODIUM 40 MILLIGRAM(S): 20 TABLET, DELAYED RELEASE ORAL at 06:10

## 2017-06-19 RX ADMIN — Medication 1: at 17:21

## 2017-06-19 RX ADMIN — HEPARIN SODIUM 5000 UNIT(S): 5000 INJECTION INTRAVENOUS; SUBCUTANEOUS at 21:23

## 2017-06-19 RX ADMIN — Medication 81 MILLIGRAM(S): at 11:45

## 2017-06-19 RX ADMIN — Medication 20 MILLIGRAM(S): at 13:11

## 2017-06-19 NOTE — PROGRESS NOTE ADULT - ATTENDING COMMENTS
[facility] NEPHROLOGY  Davonte Ma M.D.  Markell Buckner D.O.  Carina Sutton M.D.  Kylie Mack, MSN, ANP-C  [phone]    [address] [place] [address]

## 2017-06-19 NOTE — PROGRESS NOTE ADULT - SUBJECTIVE AND OBJECTIVE BOX
[facility] NEPHROLOGY- PROGRESS NOTE    Patient is a 89 [place] citizen speaking female with severe systolic CHF (s/p BiV AICD 2010 EF 25%), CKD-3 a/w CHF exacerbation and NORM on CKD-3 with hyponatremia and hypokalemia.     ROS:   Pt c/o SOB when laying flat. Denies any c/p, n/v/d or dysuria. LE edema- improving. Decreased appetite + cough    Hospital Medications: Reviewed    VITALS:  T(F): 97.4, Max: 98.2 (06-19 @ 06:04)  HR: 70  BP: 107/76  RR: 17  SpO2: 97%  Wt(kg): --    I & Os for current day (as of 06-19 @ 13:12)  =============================================  IN: 220 ml / OUT: 0 ml / NET: 220 ml      PHYSICAL EXAM:  Gen: NAD, calm  Cards: RRR, +S1/S2, no M/G/R  Resp: mild bi-basilar rales  GI: soft, NT/ND, NABS  : no CVA tenderness  Extremities: +2 LE edema B/L        LABS:  06-19    138  |  96  |  61<H>  ----------------------------<  107<H>  3.5   |  30  |  1.56<H>    Ca    9.1      19 Jun 2017 07:21  Phos  2.4     06-19  Mg     2.3     06-19      Creatinine Trend: 1.56 <--, 1.67 <--, 1.81 <--, 1.81 <--, 1.67 <--, 1.79 <--, 2.23 <--                        13.2   5.8   )-----------( 180      ( 19 Jun 2017 07:21 )             41.1     Urine Studies:    Sodium, Random Urine: 92 mmol/L (06-15 @ 18:23)  Osmolality, Random Urine: 352 mos/kg (06-15 @ 18:23)

## 2017-06-19 NOTE — PROGRESS NOTE ADULT - PROBLEM SELECTOR PLAN 1
BiV AICD 2010 EF 25%. BNP 40083, CXR w/ pulmonary vascular congestion   -c/w strict I & O, c/w daily weights  -was on Lasix and dobutamine drip, currently on Lasix IV 20mg Q8

## 2017-06-19 NOTE — PROGRESS NOTE ADULT - ASSESSMENT
Patient is a 89 Kazakh speaking female with severe systolic CHF (s/p BiV AICD 2010 EF 25%), CKD-3 a/w CHF exacerbation and NORM on CKD-3 with hyponatremia and hypokalemia.

## 2017-06-19 NOTE — PROGRESS NOTE ADULT - SUBJECTIVE AND OBJECTIVE BOX
Patient is a 89y old  Female who presents with a chief complaint of worsening shortness of breath (16 Jun 2017 10:50). Patient is comfortable today and not in SOB.       INTERVAL HPI/ OVERNIGHT EVENTS:  Patient seen and examined at bedside. Denies chest pain, palpitation, SOB, nausea, vomiting, abdominal pain.    T(C): 36.3, Max: 36.8 (06-19 @ 06:04)  HR: 70 (65 - 87)  BP: 107/76 (98/76 - 108/64)  RR: 17 (17 - 18)  SpO2: 97% (97% - 100%)        REVIEW OF SYSTEMS:  No fever,   No cough, SOB  No chest pain, palpitations  No Abd pain, nausea, vomiting, No diarrhea or constipation      PHYSICAL EXAM:    A X O x  EYES: EOMI, PERRLA,  NECK: Supple, No JVD, Normal thyroid  Resp: CTAB, crackles + b/l LL, wheezing,   CVS: Regular rate and rhythm; No murmurs, rubs, or gallops  ABD: Soft, Nontender, Nondistended; Bowel sounds present  EXTREMITIES:  2+ Peripheral Pulses, 3+ bilateral lower extremities edema.    LABS:                        13.2   5.8   )-----------( 180      ( 19 Jun 2017 07:21 )             41.1     06-19    138  |  96  |  61<H>  ----------------------------<  107<H>  3.5   |  30  |  1.56<H>    Ca    9.1      19 Jun 2017 07:21  Phos  2.4     06-19  Mg     2.3     06-19        CAPILLARY BLOOD GLUCOSE  141 (19 Jun 2017 11:50)  131 (19 Jun 2017 07:44)  160 (18 Jun 2017 12:07)          MEDICATIONS  (STANDING):  aspirin enteric coated 81milliGRAM(s) Oral daily  insulin lispro (HumaLOG) corrective regimen sliding scale  SubCutaneous three times a day before meals  dextrose 5%. 1000milliLiter(s) IV Continuous <Continuous>  dextrose 50% Injectable 12.5Gram(s) IV Push once  dextrose 50% Injectable 25Gram(s) IV Push once  dextrose 50% Injectable 25Gram(s) IV Push once  heparin  Injectable 5000Unit(s) SubCutaneous every 8 hours  pantoprazole    Tablet 40milliGRAM(s) Oral before breakfast  furosemide   Injectable 20milliGRAM(s) IV Push every 8 hours    MEDICATIONS  (PRN):  dextrose Gel 1Dose(s) Oral once PRN Blood Glucose LESS THAN 70 milliGRAM(s)/deciLiter  glucagon  Injectable 1milliGRAM(s) IntraMuscular once PRN Glucose <70 milliGRAM(s)/deciLiter      Radiology:

## 2017-06-19 NOTE — PROGRESS NOTE ADULT - ASSESSMENT
88 y/o Nigerian speaking F from home lives with daughter. PMH of severe systolic CHF (s/p BiV AICD 2010 EF 25%), DM (diet controlled) and HTN who presented with progressively increased dyspnea on exertion, orthopnea, leg swelling who improved with lasix and dobutamine drip

## 2017-06-19 NOTE — PROGRESS NOTE ADULT - PROBLEM SELECTOR PLAN 1
Previous SCr 1.45 on 3/9/17  ONRM on CKD-3 in the setting of CHF exacerbation; cardio-renal syndrome  s/p dobutamine assisted diuresis with Lasix gtt. Renal function improving but likely dilutional in the setting of fluid overload. Pt on IV lasix now increased to tid as per cards. Strict I/Os   f/u Cardiology. Avoid nephrotoxins/ NSAIDs/ RCA. Monitor BMP.

## 2017-06-20 LAB
ALBUMIN SERPL ELPH-MCNC: 3 G/DL — LOW (ref 3.5–5)
ALP SERPL-CCNC: 139 U/L — HIGH (ref 40–120)
ALT FLD-CCNC: 17 U/L DA — SIGNIFICANT CHANGE UP (ref 10–60)
ANION GAP SERPL CALC-SCNC: 13 MMOL/L — SIGNIFICANT CHANGE UP (ref 5–17)
AST SERPL-CCNC: 31 U/L — SIGNIFICANT CHANGE UP (ref 10–40)
BILIRUB SERPL-MCNC: 2.1 MG/DL — HIGH (ref 0.2–1.2)
BUN SERPL-MCNC: 60 MG/DL — HIGH (ref 7–18)
CALCIUM SERPL-MCNC: 9.1 MG/DL — SIGNIFICANT CHANGE UP (ref 8.4–10.5)
CHLORIDE SERPL-SCNC: 97 MMOL/L — SIGNIFICANT CHANGE UP (ref 96–108)
CO2 SERPL-SCNC: 27 MMOL/L — SIGNIFICANT CHANGE UP (ref 22–31)
CREAT SERPL-MCNC: 1.62 MG/DL — HIGH (ref 0.5–1.3)
GLUCOSE SERPL-MCNC: 100 MG/DL — HIGH (ref 70–99)
MAGNESIUM SERPL-MCNC: 2.2 MG/DL — SIGNIFICANT CHANGE UP (ref 1.6–2.6)
PHOSPHATE SERPL-MCNC: 3.3 MG/DL — SIGNIFICANT CHANGE UP (ref 2.5–4.5)
POTASSIUM SERPL-MCNC: 3.9 MMOL/L — SIGNIFICANT CHANGE UP (ref 3.5–5.3)
POTASSIUM SERPL-SCNC: 3.9 MMOL/L — SIGNIFICANT CHANGE UP (ref 3.5–5.3)
PROT SERPL-MCNC: 8 G/DL — SIGNIFICANT CHANGE UP (ref 6–8.3)
SODIUM SERPL-SCNC: 137 MMOL/L — SIGNIFICANT CHANGE UP (ref 135–145)

## 2017-06-20 RX ORDER — FUROSEMIDE 40 MG
40 TABLET ORAL EVERY 12 HOURS
Qty: 0 | Refills: 0 | Status: DISCONTINUED | OUTPATIENT
Start: 2017-06-20 | End: 2017-06-22

## 2017-06-20 RX ORDER — DOBUTAMINE HCL 250MG/20ML
2.5 VIAL (ML) INTRAVENOUS
Qty: 500 | Refills: 0 | Status: DISCONTINUED | OUTPATIENT
Start: 2017-06-20 | End: 2017-06-20

## 2017-06-20 RX ORDER — DOBUTAMINE HCL 250MG/20ML
2.5 VIAL (ML) INTRAVENOUS
Qty: 500 | Refills: 0 | Status: DISCONTINUED | OUTPATIENT
Start: 2017-06-20 | End: 2017-06-23

## 2017-06-20 RX ORDER — ONDANSETRON 8 MG/1
4 TABLET, FILM COATED ORAL EVERY 6 HOURS
Qty: 0 | Refills: 0 | Status: DISCONTINUED | OUTPATIENT
Start: 2017-06-20 | End: 2017-06-23

## 2017-06-20 RX ADMIN — HEPARIN SODIUM 5000 UNIT(S): 5000 INJECTION INTRAVENOUS; SUBCUTANEOUS at 13:17

## 2017-06-20 RX ADMIN — HEPARIN SODIUM 5000 UNIT(S): 5000 INJECTION INTRAVENOUS; SUBCUTANEOUS at 05:35

## 2017-06-20 RX ADMIN — Medication 40 MILLIGRAM(S): at 18:37

## 2017-06-20 RX ADMIN — HEPARIN SODIUM 5000 UNIT(S): 5000 INJECTION INTRAVENOUS; SUBCUTANEOUS at 21:27

## 2017-06-20 RX ADMIN — PANTOPRAZOLE SODIUM 40 MILLIGRAM(S): 20 TABLET, DELAYED RELEASE ORAL at 06:34

## 2017-06-20 RX ADMIN — Medication 81 MILLIGRAM(S): at 11:12

## 2017-06-20 RX ADMIN — Medication 20 MILLIGRAM(S): at 05:35

## 2017-06-20 RX ADMIN — Medication 2.33 MICROGRAM(S)/KG/MIN: at 12:28

## 2017-06-20 RX ADMIN — ONDANSETRON 4 MILLIGRAM(S): 8 TABLET, FILM COATED ORAL at 21:27

## 2017-06-20 NOTE — DIETITIAN INITIAL EVALUATION ADULT. - SOURCE
other (specify)/daughter at bedside, family speaks Upper sorbian and English, able to communicate well; chart review l/family/significant other

## 2017-06-20 NOTE — DIETITIAN INITIAL EVALUATION ADULT. - OTHER INFO
nutrition assessment for length of stay; lives [place] with family PTA; skin intact; not eating well x 3d for past 3d in hospital, no specific food choices, pt dislikes nutrition supplement per family; nutrition assessment for length of stay; lives [place] with family PTA; skin intact; not eating well for past 3d while in hospital, was 50 to 90% before per flow sheets; no specific food choices, pt dislikes nutrition supplement per family;

## 2017-06-20 NOTE — DIETITIAN INITIAL EVALUATION ADULT. - NUTRITION INTERVENTION
Feeding Assistance/Collaboration and Referral of Nutrition Care/Medical Food Supplements/Meals and Snack

## 2017-06-20 NOTE — PROGRESS NOTE ADULT - ASSESSMENT
89 y/o Swazi speaking F from home lives with daughter. PMH of severe systolic CHF (s/p BiV AICD 2010 EF 25%), DM (diet controlled) and HTN who presented with progressively increased dyspnea on exertion, orthopnea, leg swelling who improved with lasix and dobutamine drip

## 2017-06-20 NOTE — PROGRESS NOTE ADULT - PROBLEM SELECTOR PLAN 1
Previous SCr 1.45 on 3/9/17  NORM on CKD-3 in the setting of CHF exacerbation; cardio-renal syndrome. Renal function improved on dobumatine assisted diuresis. Pt with mild elevation in SCr today; likely SCr of 1.5-1.6; dilutional in the setting of fluid overload. Pt now back on dobutamin gtt with IV Lasix. Will monitor renal function. Strict I/Os.  f/u Cardiology. Avoid nephrotoxins/ NSAIDs/ RCA. Monitor BMP.

## 2017-06-20 NOTE — DIETITIAN INITIAL EVALUATION ADULT. - NS FNS WEIGHT USED FOR CALC
ideal/Ht=5'   NTN=758 lb   admission av=503 lb  BMI=26.7   wk=377.5 lb 6/16/17-->151.6 lb 6/20/17 ??, may due to scale variance

## 2017-06-20 NOTE — PROGRESS NOTE ADULT - PROBLEM SELECTOR PLAN 1
BiV AICD 2010 EF 25%. BNP 05644, CXR w/ pulmonary vascular congestion   -c/w strict I & O, c/w daily weights  -was on Lasix and dobutamine drip, then tapered to Lasix IV 20mg Q8  Patient was put back on Dobutamine drip 2.5mcg and increased Lasix to 40mg IV BID today due to progressive congestive feature with elevated bilirubin, LFT and Creatinine.

## 2017-06-20 NOTE — PROGRESS NOTE ADULT - SUBJECTIVE AND OBJECTIVE BOX
[facility] NEPHROLOGY- PROGRESS NOTE    Patient is a 89 Moldovan speaking female with severe systolic CHF (s/p BiV AICD 2010 EF 25%), CKD-3 a/w CHF exacerbation and NORM on CKD-3 with hyponatremia and hypokalemia.     ROS:   Pt c/o SOB when laying flat. Denies any c/p, n/v/d or dysuria. LE edema- stable. Decreased appetite + cough    Hospital Medications: Reviewed    VITALS:  T(F): 98.1, Max: 98.1 (06-19 @ 19:50)  HR: 67  BP: 99/65  RR: 17  SpO2: 100%  Wt(kg): --  I & Os for 24h ending 06-20 @ 07:00  =============================================  IN: 411 ml / OUT: 825 ml / NET: -414 ml    I & Os for current day (as of 06-20 @ 18:11)  =============================================  IN: 253.6 ml / OUT: 0 ml / NET: 253.6 ml      Weight (kg): 58.8 (06-20 @ 04:30)    PHYSICAL EXAM:  Gen: NAD, calm  Cards: RRR, +S1/S2, no M/G/R  Resp: mild bi-basilar rales  GI: soft, NT/ND, NABS  : no CVA tenderness  Extremities: +2 LE edema B/L      LABS:  06-20    137  |  97  |  60<H>  ----------------------------<  100<H>  3.9   |  27  |  1.62<H>    Ca    9.1      20 Jun 2017 05:51  Phos  3.3     06-20  Mg     2.2     06-20    TPro  8.0  /  Alb  3.0<L>  /  TBili  2.1<H>  /  DBili      /  AST  31  /  ALT  17  /  AlkPhos  139<H>  06-20    Creatinine Trend: 1.62 <--, 1.56 <--, 1.67 <--, 1.81 <--, 1.81 <--, 1.67 <--, 1.79 <--, 2.23 <--                        13.2   5.8   )-----------( 180      ( 19 Jun 2017 07:21 )             41.1     Urine Studies:    Sodium, Random Urine: 92 mmol/L (06-15 @ 18:23)  Osmolality, Random Urine: 352 mos/kg (06-15 @ 18:23)

## 2017-06-20 NOTE — PROGRESS NOTE ADULT - ATTENDING COMMENTS
[facility] NEPHROLOGY  Davonte Ma M.D.  Markell Buckner D.O.  Carina Sutton M.D.  Kylie Mack, MSN, ANP-C  [phone]    [address] [place] [address]

## 2017-06-20 NOTE — PROGRESS NOTE ADULT - ASSESSMENT
Patient is a 89 Luxembourgish speaking female with severe systolic CHF (s/p BiV AICD 2010 EF 25%), CKD-3 a/w CHF exacerbation and NORM on CKD-3 with hyponatremia and hypokalemia.

## 2017-06-20 NOTE — DIETITIAN INITIAL EVALUATION ADULT. - DIET TYPE
DASH/TLC (sodium and cholesterol restricted diet)/consistent carbohydrate (evening snack) consistent carbohydrate (evening snack)/1000ml/DASH/TLC (sodium and cholesterol restricted diet)

## 2017-06-20 NOTE — PROGRESS NOTE ADULT - SUBJECTIVE AND OBJECTIVE BOX
Patient is a 89y old  Female who presents with a chief complaint of worsening shortness of breath (16 Jun 2017 10:50). Spoke with son over the phone at  and updated her medical information.       INTERVAL HPI/ OVERNIGHT EVENTS:  Patient seen and examined at bedside. Denies chest pain, palpitation, SOB, nausea, vomiting, abdominal pain.    T(C): 36.7, Max: 36.7 (06-19 @ 19:50)  HR: 80 (62 - 85)  BP: 120/74 (100/67 - 120/74)  RR: 18 (17 - 18)  SpO2: 98% (96% - 100%)  Wt(kg): --  I&O's Summary    I & Os for current day (as of 20 Jun 2017 12:17)  =============================================  IN: 411 ml / OUT: 825 ml / NET: -414 ml        REVIEW OF SYSTEMS:  No fever,   No cough, SOB  No chest pain, palpitations  No Abd pain, nausea, vomiting, No diarrhea or constipation      PHYSICAL EXAM:    A X O x 3  EYES: EOMI, PERRLA,  NECK: Supple, No JVD, Normal thyroid  Resp: CTAB, No crackles, wheezing,   CVS: Regular rate and rhythm; No murmurs, rubs, or gallops  ABD: Soft, Nontender, Nondistended; Bowel sounds present  EXTREMITIES:  2+ Peripheral Pulses, No edema    LABS:                        13.2   5.8   )-----------( 180      ( 19 Jun 2017 07:21 )             41.1     06-20    137  |  97  |  60<H>  ----------------------------<  100<H>  3.9   |  27  |  1.62<H>    Ca    9.1      20 Jun 2017 05:51  Phos  3.3     06-20  Mg     2.2     06-20    TPro  8.0  /  Alb  3.0<L>  /  TBili  2.1<H>  /  DBili  x   /  AST  31  /  ALT  17  /  AlkPhos  139<H>  06-20      CAPILLARY BLOOD GLUCOSE  105 (20 Jun 2017 11:14)  112 (20 Jun 2017 07:36)  110 (19 Jun 2017 21:21)  175 (19 Jun 2017 16:29)          MEDICATIONS  (STANDING):  aspirin enteric coated 81milliGRAM(s) Oral daily  insulin lispro (HumaLOG) corrective regimen sliding scale  SubCutaneous three times a day before meals  dextrose 5%. 1000milliLiter(s) IV Continuous <Continuous>  dextrose 50% Injectable 12.5Gram(s) IV Push once  dextrose 50% Injectable 25Gram(s) IV Push once  dextrose 50% Injectable 25Gram(s) IV Push once  heparin  Injectable 5000Unit(s) SubCutaneous every 8 hours  pantoprazole    Tablet 40milliGRAM(s) Oral before breakfast  furosemide   Injectable 40milliGRAM(s) IV Push every 12 hours  DOBUTamine Infusion 2.5MICROgram(s)/kG/Min IV Continuous <Continuous>    MEDICATIONS  (PRN):  dextrose Gel 1Dose(s) Oral once PRN Blood Glucose LESS THAN 70 milliGRAM(s)/deciLiter  glucagon  Injectable 1milliGRAM(s) IntraMuscular once PRN Glucose <70 milliGRAM(s)/deciLiter      Radiology:

## 2017-06-21 LAB
ANION GAP SERPL CALC-SCNC: 13 MMOL/L — SIGNIFICANT CHANGE UP (ref 5–17)
BUN SERPL-MCNC: 63 MG/DL — HIGH (ref 7–18)
CALCIUM SERPL-MCNC: 9.4 MG/DL — SIGNIFICANT CHANGE UP (ref 8.4–10.5)
CHLORIDE SERPL-SCNC: 96 MMOL/L — SIGNIFICANT CHANGE UP (ref 96–108)
CO2 SERPL-SCNC: 26 MMOL/L — SIGNIFICANT CHANGE UP (ref 22–31)
CREAT SERPL-MCNC: 1.89 MG/DL — HIGH (ref 0.5–1.3)
GLUCOSE SERPL-MCNC: 113 MG/DL — HIGH (ref 70–99)
HCT VFR BLD CALC: 39.5 % — SIGNIFICANT CHANGE UP (ref 34.5–45)
HGB BLD-MCNC: 13.1 G/DL — SIGNIFICANT CHANGE UP (ref 11.5–15.5)
MAGNESIUM SERPL-MCNC: 2.2 MG/DL — SIGNIFICANT CHANGE UP (ref 1.6–2.6)
MCHC RBC-ENTMCNC: 33.2 GM/DL — SIGNIFICANT CHANGE UP (ref 32–36)
MCHC RBC-ENTMCNC: 33.9 PG — SIGNIFICANT CHANGE UP (ref 27–34)
MCV RBC AUTO: 102.2 FL — HIGH (ref 80–100)
PHOSPHATE SERPL-MCNC: 2.9 MG/DL — SIGNIFICANT CHANGE UP (ref 2.5–4.5)
PLATELET # BLD AUTO: 157 K/UL — SIGNIFICANT CHANGE UP (ref 150–400)
POTASSIUM SERPL-MCNC: 3.8 MMOL/L — SIGNIFICANT CHANGE UP (ref 3.5–5.3)
POTASSIUM SERPL-SCNC: 3.8 MMOL/L — SIGNIFICANT CHANGE UP (ref 3.5–5.3)
RBC # BLD: 3.87 M/UL — SIGNIFICANT CHANGE UP (ref 3.8–5.2)
RBC # FLD: 14.4 % — SIGNIFICANT CHANGE UP (ref 10.3–14.5)
SODIUM SERPL-SCNC: 135 MMOL/L — SIGNIFICANT CHANGE UP (ref 135–145)
WBC # BLD: 5.7 K/UL — SIGNIFICANT CHANGE UP (ref 3.8–10.5)
WBC # FLD AUTO: 5.7 K/UL — SIGNIFICANT CHANGE UP (ref 3.8–10.5)

## 2017-06-21 PROCEDURE — 71010: CPT | Mod: 26

## 2017-06-21 RX ADMIN — HEPARIN SODIUM 5000 UNIT(S): 5000 INJECTION INTRAVENOUS; SUBCUTANEOUS at 14:45

## 2017-06-21 RX ADMIN — HEPARIN SODIUM 5000 UNIT(S): 5000 INJECTION INTRAVENOUS; SUBCUTANEOUS at 21:18

## 2017-06-21 RX ADMIN — HEPARIN SODIUM 5000 UNIT(S): 5000 INJECTION INTRAVENOUS; SUBCUTANEOUS at 05:49

## 2017-06-21 RX ADMIN — Medication 81 MILLIGRAM(S): at 12:28

## 2017-06-21 RX ADMIN — ONDANSETRON 4 MILLIGRAM(S): 8 TABLET, FILM COATED ORAL at 05:49

## 2017-06-21 RX ADMIN — Medication 40 MILLIGRAM(S): at 05:49

## 2017-06-21 RX ADMIN — ONDANSETRON 4 MILLIGRAM(S): 8 TABLET, FILM COATED ORAL at 12:28

## 2017-06-21 NOTE — PROGRESS NOTE ADULT - SUBJECTIVE AND OBJECTIVE BOX
Patient is a 89y old  Female who presents with a chief complaint of worsening shortness of breath. Daughter at bedside stated patient felt better today.       INTERVAL HPI/ OVERNIGHT EVENTS:  Patient seen and examined at bedside. Denies chest pain, palpitation, SOB, nausea, vomiting, abdominal pain.    T(C): 36.8, Max: 36.9 (06-20 @ 19:16)  HR: 60 (60 - 82)  BP: 109/79 (99/65 - 117/87)  RR: 19 (17 - 19)  SpO2: 99% (97% - 100%)  Wt(kg): --  I&O's Summary  I & Os for 24h ending 21 Jun 2017 07:00  =============================================  IN: 391.8 ml / OUT: 300 ml / NET: 91.8 ml    I & Os for current day (as of 21 Jun 2017 12:49)  =============================================  IN: 30 ml / OUT: 0 ml / NET: 30 ml        REVIEW OF SYSTEMS:  No fever,   No cough, SOB  No chest pain, palpitations  No Abd pain, nausea, vomiting, No diarrhea or constipation      PHYSICAL EXAM:    Neuro: AAO x  EYES: EOMI, PERRLA,  NECK: Supple, No JVD, Normal thyroid  Resp: CTAB,  wheezing, Crackles bilateral R>L side lung base  CVS: Regular rate and rhythm; No murmurs, rubs, or gallops  ABD: Soft, Nontender, Nondistended; Bowel sounds present  EXTREMITIES:  2+ Peripheral Pulses, Bilateral lower extremities edema 3+    LABS:                        13.1   5.7   )-----------( 157      ( 21 Jun 2017 08:00 )             39.5     06-21    135  |  96  |  63<H>  ----------------------------<  113<H>  3.8   |  26  |  1.89<H>      CAPILLARY BLOOD GLUCOSE  141 (21 Jun 2017 10:57)  116 (21 Jun 2017 07:33)  124 (20 Jun 2017 21:10)  137 (20 Jun 2017 16:24)          MEDICATIONS  (STANDING):  aspirin enteric coated 81milliGRAM(s) Oral daily  insulin lispro (HumaLOG) corrective regimen sliding scale  SubCutaneous three times a day before meals  dextrose 5%. 1000milliLiter(s) IV Continuous <Continuous>  dextrose 50% Injectable 12.5Gram(s) IV Push once  dextrose 50% Injectable 25Gram(s) IV Push once  dextrose 50% Injectable 25Gram(s) IV Push once  heparin  Injectable 5000Unit(s) SubCutaneous every 8 hours  pantoprazole    Tablet 40milliGRAM(s) Oral before breakfast  furosemide   Injectable 40milliGRAM(s) IV Push every 12 hours  DOBUTamine Infusion 2.5MICROgram(s)/kG/Min IV Continuous <Continuous>    MEDICATIONS  (PRN):  dextrose Gel 1Dose(s) Oral once PRN Blood Glucose LESS THAN 70 milliGRAM(s)/deciLiter  glucagon  Injectable 1milliGRAM(s) IntraMuscular once PRN Glucose <70 milliGRAM(s)/deciLiter  ondansetron Injectable 4milliGRAM(s) IV Push every 6 hours PRN Nausea and/or Vomiting      Study limited due to rotation.    Left cardiac device present. Increased interstitial lung markings without   significant change. Nonspecific focal nodular density overlies right   upper lung.    Superimposed mild airspace opacity right lung base without significant   change given differences in technique.    No significant pleural effusion. Cardiac device obscures left CP angle.    Heart size cannot be accurately assessed in this projection, but appear   enlarged.

## 2017-06-21 NOTE — PROGRESS NOTE ADULT - PROBLEM SELECTOR PLAN 1
Previous SCr 1.45 on 3/9/17  NORM on CKD-3 in the setting of CHF exacerbation; cardio-renal syndrome. Renal function improved on dobumatine assisted diuresis. Pt with mild elevation in SCr today; likely SCr of 1.5-1.6; dilutional in the setting of fluid overload. Pt now on dobutamin gtt with IV Lasix. Will monitor renal function. Strict I/Os.  f/u Cardiology. Avoid nephrotoxins/ NSAIDs/ RCA. Monitor BMP.

## 2017-06-21 NOTE — PROGRESS NOTE ADULT - ASSESSMENT
88 y/o Saudi Arabian speaking F from home lives with daughter. PMH of severe systolic CHF (s/p BiV AICD 2010 EF 25%), DM (diet controlled) and HTN who presented with progressively increased dyspnea on exertion, orthopnea, leg swelling who improved with lasix and dobutamine drip

## 2017-06-21 NOTE — PROGRESS NOTE ADULT - SUBJECTIVE AND OBJECTIVE BOX
[facility] NEPHROLOGY- PROGRESS NOTE    Patient is a 89 Mauritian speaking female with severe systolic CHF (s/p BiV AICD 2010 EF 25%), CKD-3 a/w CHF exacerbation and NORM on CKD-3 with hyponatremia and hypokalemia.     ROS:   Pt c/o SOB with n/v this am. Denies any c/p or dysuria. LE edema- stable. Decreased appetite + cough    Hospital Medications: Reviewed    VITALS:  T(F): 98.2, Max: 98.4 (06-20 @ 19:16)  HR: 60  BP: 109/79  RR: 19  SpO2: 99%  Wt(kg): --  I & Os for 24h ending 06-21 @ 07:00  =============================================  IN: 391.8 ml / OUT: 300 ml / NET: 91.8 ml    I & Os for current day (as of 06-21 @ 12:02)  =============================================  IN: 30 ml / OUT: 0 ml / NET: 30 ml      Weight (kg): 58.8 (06-20 @ 04:30)    PHYSICAL EXAM:  Gen: NAD, calm  Cards: RRR, +S1/S2, no M/G/R  Resp: mild bi-basilar rales- improved  GI: soft, NT/ND, NABS  : no CVA tenderness  Extremities: +2 LE edema B/L        LABS:  06-21    135  |  96  |  63<H>  ----------------------------<  113<H>  3.8   |  26  |  1.89<H>    Ca    9.4      21 Jun 2017 08:00  Phos  2.9     06-21  Mg     2.2     06-21    TPro  8.0  /  Alb  3.0<L>  /  TBili  2.1<H>  /  DBili      /  AST  31  /  ALT  17  /  AlkPhos  139<H>  06-20    Creatinine Trend: 1.89 <--, 1.62 <--, 1.56 <--, 1.67 <--, 1.81 <--, 1.81 <--, 1.67 <--, 1.79 <--                        13.1   5.7   )-----------( 157      ( 21 Jun 2017 08:00 )             39.5     Urine Studies:    Sodium, Random Urine: 92 mmol/L (06-15 @ 18:23)  Osmolality, Random Urine: 352 mos/kg (06-15 @ 18:23)

## 2017-06-21 NOTE — PROGRESS NOTE ADULT - ATTENDING COMMENTS
[facility] NEPHROLOGY  Davonte Ma M.D.  Markell Buckner D.O.  Carina Sutton M.D.  Kylie Mack, MSN, ANP-C  [phone]    [address] [place] [address]

## 2017-06-21 NOTE — PROGRESS NOTE ADULT - PROBLEM SELECTOR PLAN 1
BiV AICD 2010 EF 25%. BNP 11987, CXR w/ pulmonary vascular congestion   -c/w strict I & O, c/w daily weights  Lasix 40mg IV BID and dobutamine drip, Clinically better.

## 2017-06-22 LAB
ANION GAP SERPL CALC-SCNC: 15 MMOL/L — SIGNIFICANT CHANGE UP (ref 5–17)
BUN SERPL-MCNC: 61 MG/DL — HIGH (ref 7–18)
CALCIUM SERPL-MCNC: 9.3 MG/DL — SIGNIFICANT CHANGE UP (ref 8.4–10.5)
CHLORIDE SERPL-SCNC: 95 MMOL/L — LOW (ref 96–108)
CO2 SERPL-SCNC: 26 MMOL/L — SIGNIFICANT CHANGE UP (ref 22–31)
CREAT SERPL-MCNC: 2.01 MG/DL — HIGH (ref 0.5–1.3)
GLUCOSE SERPL-MCNC: 100 MG/DL — HIGH (ref 70–99)
MAGNESIUM SERPL-MCNC: 2.1 MG/DL — SIGNIFICANT CHANGE UP (ref 1.6–2.6)
PHOSPHATE SERPL-MCNC: 2.8 MG/DL — SIGNIFICANT CHANGE UP (ref 2.5–4.5)
POTASSIUM SERPL-MCNC: 3.7 MMOL/L — SIGNIFICANT CHANGE UP (ref 3.5–5.3)
POTASSIUM SERPL-SCNC: 3.7 MMOL/L — SIGNIFICANT CHANGE UP (ref 3.5–5.3)
SODIUM SERPL-SCNC: 136 MMOL/L — SIGNIFICANT CHANGE UP (ref 135–145)

## 2017-06-22 RX ORDER — FUROSEMIDE 40 MG
40 TABLET ORAL DAILY
Qty: 0 | Refills: 0 | Status: DISCONTINUED | OUTPATIENT
Start: 2017-06-23 | End: 2017-06-23

## 2017-06-22 RX ADMIN — Medication 2.21 MICROGRAM(S)/KG/MIN: at 12:42

## 2017-06-22 RX ADMIN — PANTOPRAZOLE SODIUM 40 MILLIGRAM(S): 20 TABLET, DELAYED RELEASE ORAL at 05:26

## 2017-06-22 RX ADMIN — HEPARIN SODIUM 5000 UNIT(S): 5000 INJECTION INTRAVENOUS; SUBCUTANEOUS at 21:43

## 2017-06-22 RX ADMIN — HEPARIN SODIUM 5000 UNIT(S): 5000 INJECTION INTRAVENOUS; SUBCUTANEOUS at 05:26

## 2017-06-22 RX ADMIN — HEPARIN SODIUM 5000 UNIT(S): 5000 INJECTION INTRAVENOUS; SUBCUTANEOUS at 13:07

## 2017-06-22 RX ADMIN — Medication 81 MILLIGRAM(S): at 12:42

## 2017-06-22 RX ADMIN — Medication 40 MILLIGRAM(S): at 05:26

## 2017-06-22 RX ADMIN — ONDANSETRON 4 MILLIGRAM(S): 8 TABLET, FILM COATED ORAL at 05:30

## 2017-06-22 NOTE — PROGRESS NOTE ADULT - PROBLEM SELECTOR PLAN 1
Cr with an upward trend   Previous SCr 1.45 on 3/9/17  NORM on CKD-3 in the setting of CHF exacerbation; cardio-renal syndrome. Renal function improved on dobumatine assisted diuresis. Pt with mild elevation in SCr today; likely SCr of 1.5-1.6; dilutional in the setting of fluid overload. Pt now on dobutamin gtt with IV Lasix. Will monitor renal function. Strict I/Os.  f/u Cardiology. Avoid nephrotoxins/ NSAIDs/ RCA. Monitor BMP. Cr with an upward trend   Lasix changed from IV to 40mg po   Previous SCr 1.45 on 3/9/17  NORM on CKD-3 in the setting of CHF exacerbation; cardio-renal syndrome. Renal function improved on dobumatine assisted diuresis. Pt with mild elevation in SCr today; likely SCr of 1.5-1.6; dilutional in the setting of fluid overload. Pt now on dobutamin gtt with IV Lasix. Will monitor renal function. Strict I/Os.  f/u Cardiology. Avoid nephrotoxins/ NSAIDs/ RCA. Monitor BMP. Cr with an upward trend   Lasix changed from IV to 40mg po   Previous SCr 1.45 on 3/9/17  NORM on CKD-3 in the setting of CHF exacerbation; cardio-renal syndrome. Renal function improved on dobumatine assisted diuresis. Pt with mild elevation in SCr today; likely SCr of 1.5-1.6; dilutional in the setting of fluid overload. Pt now on dobutamin gtt with Lasix. Will monitor renal function. Strict I/Os.  f/u Cardiology. Avoid nephrotoxins/ NSAIDs/ RCA. Monitor BMP.

## 2017-06-22 NOTE — PROGRESS NOTE ADULT - SUBJECTIVE AND OBJECTIVE BOX
Patient is a 89y old  Female who presents with a chief complaint of worsening shortness of breath     INTERVAL HPI/OVERNIGHT EVENTS:  Patient seen and examined at bedside. Denies chest pain, palpitation, SOB, nausea, vomiting, abdominal pain.    T(C): 36.7, Max: 36.8 (06-21 @ 10:57)  HR: 81 (56 - 81)  BP: 107/77 (96/64 - 109/79)  RR: 17 (17 - 19)  SpO2: 100% (96% - 100%)  Wt(kg): --  I&O's Summary    I & Os for current day (as of 22 Jun 2017 10:17)  =============================================  IN: 218 ml / OUT: 300 ml / NET: -82 ml        REVIEW OF SYSTEMS:  No fever,   No cough, SOB  No chest pain, palpitations  No Abd pain, nausea, vomiting, No diarrhea or constipation      PHYSICAL EXAM:    Neuro: AAO x 3  EYES: EOMI, PERRLA,  NECK: Supple, No JVD, Normal thyroid  Resp: CTAB,  wheezing, crackles positive bilaterl lower lung but improved.   CVS: Regular rate and rhythm; No murmurs, rubs, or gallops  ABD: Soft, Nontender, Nondistended; Bowel sounds present  EXTREMITIES:  2+ Peripheral Pulses, 3+bilateral edema the same    LABS:                        13.1   5.7   )-----------( 157      ( 21 Jun 2017 08:00 )             39.5     06-22    136  |  95<L>  |  61<H>  ----------------------------<  100<H>  3.7   |  26  |  2.01<H>    Ca    9.3      22 Jun 2017 08:19  Phos  2.8     06-22  Mg     2.1     06-22        CAPILLARY BLOOD GLUCOSE  128 (21 Jun 2017 21:04)  114 (21 Jun 2017 15:56)  141 (21 Jun 2017 10:57)          MEDICATIONS  (STANDING):  aspirin enteric coated 81milliGRAM(s) Oral daily  insulin lispro (HumaLOG) corrective regimen sliding scale  SubCutaneous three times a day before meals  dextrose 5%. 1000milliLiter(s) IV Continuous <Continuous>  dextrose 50% Injectable 12.5Gram(s) IV Push once  dextrose 50% Injectable 25Gram(s) IV Push once  dextrose 50% Injectable 25Gram(s) IV Push once  heparin  Injectable 5000Unit(s) SubCutaneous every 8 hours  pantoprazole    Tablet 40milliGRAM(s) Oral before breakfast  DOBUTamine Infusion 2.5MICROgram(s)/kG/Min IV Continuous <Continuous>    MEDICATIONS  (PRN):  dextrose Gel 1Dose(s) Oral once PRN Blood Glucose LESS THAN 70 milliGRAM(s)/deciLiter  glucagon  Injectable 1milliGRAM(s) IntraMuscular once PRN Glucose <70 milliGRAM(s)/deciLiter  ondansetron Injectable 4milliGRAM(s) IV Push every 6 hours PRN Nausea and/or Vomiting      Radiology:

## 2017-06-22 NOTE — PROGRESS NOTE ADULT - ASSESSMENT
Patient is a 89 Urdu speaking female with severe systolic CHF (s/p BiV AICD 2010 EF 25%), CKD-3 a/w CHF exacerbation and NORM on CKD-3 with hyponatremia and hypokalemia.

## 2017-06-22 NOTE — PROGRESS NOTE ADULT - SUBJECTIVE AND OBJECTIVE BOX
[facility] NEPHROLOGY- PROGRESS NOTE, Follow up     Patient is a 89y Female with severe systolic CHF (s/p BiV AICD 2010 EF 25%), CKD-3 a/w CHF exacerbation and NORM on CKD-3 with hyponatremia and hypokalemia.   Allergy:  No Known Allergies    Hospital Medications:   MEDICATIONS  (STANDING):  aspirin enteric coated 81milliGRAM(s) Oral daily  insulin lispro (HumaLOG) corrective regimen sliding scale  SubCutaneous three times a day before meals  dextrose 5%. 1000milliLiter(s) IV Continuous <Continuous>  dextrose 50% Injectable 12.5Gram(s) IV Push once  dextrose 50% Injectable 25Gram(s) IV Push once  dextrose 50% Injectable 25Gram(s) IV Push once  heparin  Injectable 5000Unit(s) SubCutaneous every 8 hours  pantoprazole    Tablet 40milliGRAM(s) Oral before breakfast  DOBUTamine Infusion 2.5MICROgram(s)/kG/Min IV Continuous <Continuous>    REVIEW OF SYSTEMS:  No c/o sob cp palpitations abd pain     VITALS:  T(F): 97.4, Max: 98.1 (06-22 @ 07:30)  HR: 74  BP: 105/78  RR: 18  SpO2: 99%  Wt(kg): --    I & Os for current day (as of 06-22 @ 12:41)  =============================================  IN: 218 ml / OUT: 300 ml / NET: -82 ml      PHYSICAL EXAM:  Gen: NAD, calm  Cards: RRR, +S1/S2, no M/G/R  Resp: mild bi-basilar rales- improving   GI: soft, NT/ND, NABS  : no CVA tenderness  Extremities: +2 LE edema B/L      LABS:  06-22    136  |  95<L>  |  61<H>  ----------------------------<  100<H>  3.7   |  26  |  2.01<H>    Ca    9.3      22 Jun 2017 08:19  Phos  2.8     06-22  Mg     2.1     06-22      Creatinine Trend: 2.01 <--, 1.89 <--, 1.62 <--, 1.56 <--, 1.67 <--, 1.81 <--, 1.81 <--, 1.67 <--                        13.1   5.7   )-----------( 157      ( 21 Jun 2017 08:00 )             39.5     Urine Studies:    Sodium, Random Urine: 92 mmol/L (06-15 @ 18:23)  Osmolality, Random Urine: 352 mos/kg (06-15 @ 18:23)    RADIOLOGY & ADDITIONAL STUDIES:

## 2017-06-22 NOTE — PROGRESS NOTE ADULT - PROBLEM SELECTOR PLAN 6
c/w heparin sq
c/w heparin sq
c/w heparin sq  DC plan-After diuresis, patient can be discharged to home with home PT
c/w heparin sq  DC plan-tomorrow with  PO Lasix 40mg daily to home with home PT
c/w heparin sq

## 2017-06-22 NOTE — PROGRESS NOTE ADULT - ATTENDING COMMENTS
[facility] NEPHROLOGY  Davonte Ma M.D.  Markell Buckner D.O.  Carina Sutton M.D.  Kylie Mack, MSN, ANP-C  [phone]    [address] [place] [address] Patient seen and examined. Agree with plan above.  Note edited as necessary.  Pt with NORM on CKD-3 in the setting of CHF exacerbation; likely cardio-renal syndrome. Pt with Severe global LV dysfunction with grade-3 diastolic dysfunction. Pt likley with end stage HF. f/u cardiology    [facility] NEPHROLOGY  Davonte Ma M.D.  Markell Buckner D.O.  Carina Sutton M.D.  Kylie Mack, MSN, ANP-C  [phone]    [address] [place], [place] [id number]

## 2017-06-22 NOTE — PROGRESS NOTE ADULT - PROBLEM SELECTOR PLAN 1
BiV AICD 2010 EF 25%. BNP 61306, CXR w/ pulmonary vascular congestion   Held Lasix today, clinically improved with increased creatinine level.

## 2017-06-23 VITALS
OXYGEN SATURATION: 99 % | TEMPERATURE: 98 F | DIASTOLIC BLOOD PRESSURE: 75 MMHG | SYSTOLIC BLOOD PRESSURE: 106 MMHG | RESPIRATION RATE: 16 BRPM | HEART RATE: 70 BPM

## 2017-06-23 LAB
ANION GAP SERPL CALC-SCNC: 15 MMOL/L — SIGNIFICANT CHANGE UP (ref 5–17)
BUN SERPL-MCNC: 62 MG/DL — HIGH (ref 7–18)
CALCIUM SERPL-MCNC: 9.4 MG/DL — SIGNIFICANT CHANGE UP (ref 8.4–10.5)
CHLORIDE SERPL-SCNC: 95 MMOL/L — LOW (ref 96–108)
CO2 SERPL-SCNC: 24 MMOL/L — SIGNIFICANT CHANGE UP (ref 22–31)
CREAT SERPL-MCNC: 2.18 MG/DL — HIGH (ref 0.5–1.3)
GLUCOSE SERPL-MCNC: 103 MG/DL — HIGH (ref 70–99)
MAGNESIUM SERPL-MCNC: 2.2 MG/DL — SIGNIFICANT CHANGE UP (ref 1.6–2.6)
PHOSPHATE SERPL-MCNC: 2.8 MG/DL — SIGNIFICANT CHANGE UP (ref 2.5–4.5)
POTASSIUM SERPL-MCNC: 3.9 MMOL/L — SIGNIFICANT CHANGE UP (ref 3.5–5.3)
POTASSIUM SERPL-SCNC: 3.9 MMOL/L — SIGNIFICANT CHANGE UP (ref 3.5–5.3)
SODIUM SERPL-SCNC: 134 MMOL/L — LOW (ref 135–145)

## 2017-06-23 PROCEDURE — 82550 ASSAY OF CK (CPK): CPT

## 2017-06-23 PROCEDURE — 84300 ASSAY OF URINE SODIUM: CPT

## 2017-06-23 PROCEDURE — 83880 ASSAY OF NATRIURETIC PEPTIDE: CPT

## 2017-06-23 PROCEDURE — 93005 ELECTROCARDIOGRAM TRACING: CPT

## 2017-06-23 PROCEDURE — 83935 ASSAY OF URINE OSMOLALITY: CPT

## 2017-06-23 PROCEDURE — 85730 THROMBOPLASTIN TIME PARTIAL: CPT

## 2017-06-23 PROCEDURE — 85610 PROTHROMBIN TIME: CPT

## 2017-06-23 PROCEDURE — 93306 TTE W/DOPPLER COMPLETE: CPT

## 2017-06-23 PROCEDURE — 83690 ASSAY OF LIPASE: CPT

## 2017-06-23 PROCEDURE — 80053 COMPREHEN METABOLIC PANEL: CPT

## 2017-06-23 PROCEDURE — 85027 COMPLETE CBC AUTOMATED: CPT

## 2017-06-23 PROCEDURE — 97161 PT EVAL LOW COMPLEX 20 MIN: CPT

## 2017-06-23 PROCEDURE — 84156 ASSAY OF PROTEIN URINE: CPT

## 2017-06-23 PROCEDURE — 82553 CREATINE MB FRACTION: CPT

## 2017-06-23 PROCEDURE — 83735 ASSAY OF MAGNESIUM: CPT

## 2017-06-23 PROCEDURE — 99285 EMERGENCY DEPT VISIT HI MDM: CPT | Mod: 25

## 2017-06-23 PROCEDURE — 82607 VITAMIN B-12: CPT

## 2017-06-23 PROCEDURE — 80061 LIPID PANEL: CPT

## 2017-06-23 PROCEDURE — 84484 ASSAY OF TROPONIN QUANT: CPT

## 2017-06-23 PROCEDURE — 99291 CRITICAL CARE FIRST HOUR: CPT | Mod: 25

## 2017-06-23 PROCEDURE — 82746 ASSAY OF FOLIC ACID SERUM: CPT

## 2017-06-23 PROCEDURE — 71045 X-RAY EXAM CHEST 1 VIEW: CPT

## 2017-06-23 PROCEDURE — 83036 HEMOGLOBIN GLYCOSYLATED A1C: CPT

## 2017-06-23 PROCEDURE — 80048 BASIC METABOLIC PNL TOTAL CA: CPT

## 2017-06-23 PROCEDURE — 84100 ASSAY OF PHOSPHORUS: CPT

## 2017-06-23 RX ORDER — FUROSEMIDE 40 MG
20 TABLET ORAL
Qty: 0 | Refills: 0 | Status: DISCONTINUED | OUTPATIENT
Start: 2017-06-23 | End: 2017-06-23

## 2017-06-23 RX ORDER — FUROSEMIDE 40 MG
1 TABLET ORAL
Qty: 0 | Refills: 0 | COMMUNITY

## 2017-06-23 RX ADMIN — HEPARIN SODIUM 5000 UNIT(S): 5000 INJECTION INTRAVENOUS; SUBCUTANEOUS at 06:10

## 2017-06-23 RX ADMIN — PANTOPRAZOLE SODIUM 40 MILLIGRAM(S): 20 TABLET, DELAYED RELEASE ORAL at 06:09

## 2017-06-23 RX ADMIN — Medication 20 MILLIGRAM(S): at 12:24

## 2017-06-23 RX ADMIN — Medication 40 MILLIGRAM(S): at 06:09

## 2017-06-23 RX ADMIN — Medication 81 MILLIGRAM(S): at 12:24

## 2017-06-23 NOTE — PROGRESS NOTE ADULT - PROBLEM SELECTOR PROBLEM 1
NORM (acute kidney injury)
Acute systolic congestive heart failure
NORM (acute kidney injury)

## 2017-06-23 NOTE — PROGRESS NOTE ADULT - PROBLEM SELECTOR PLAN 5
hypokalemia 2/2 diuretics  c/w potassium replacement  f/u BMP
in the setting of diuretic use. Resolved s/p repletion. Monitor serum K; replete as needed.
in the setting of diuretic use. Resolved with repletion. Monitor serum K; replete as needed.
hypokalemia 2/2 diuretics  c/w potassium replacement  f/u BMP
in the setting of diuretic use.  Pt given KCL 40meq PO x2  Monitor serum K; replete as needed.

## 2017-06-23 NOTE — PROGRESS NOTE ADULT - SUBJECTIVE AND OBJECTIVE BOX
[facility] NEPHROLOGY- PROGRESS NOTE, Follow up     Patient is a 89y Female with severe systolic CHF (s/p BiV AICD 2010 EF 25%), CKD-3 a/w CHF exacerbation and NORM on CKD-3 with hyponatremia and hypokalemia.   Allergy:No Known Allergies    Hospital Medications:   MEDICATIONS  (STANDING):  aspirin enteric coated 81milliGRAM(s) Oral daily  insulin lispro (HumaLOG) corrective regimen sliding scale  SubCutaneous three times a day before meals  dextrose 5%. 1000milliLiter(s) IV Continuous <Continuous>  dextrose 50% Injectable 12.5Gram(s) IV Push once  dextrose 50% Injectable 25Gram(s) IV Push once  dextrose 50% Injectable 25Gram(s) IV Push once  heparin  Injectable 5000Unit(s) SubCutaneous every 8 hours  pantoprazole    Tablet 40milliGRAM(s) Oral before breakfast  furosemide    Tablet 20milliGRAM(s) Oral four times a day    REVIEW OF SYSTEMS:   No c/o sob cp palpitations abd pain  VITALS:  T(F): 97.3, Max: 97.9 (06-22 @ 15:41)  HR: 73  BP: 112/73  RR: 16  SpO2: 100%  Wt(kg): --    I & Os for current day (as of 06-23 @ 11:26)  =============================================  IN: 342.8 ml / OUT: 575 ml / NET: -232.2 ml      PHYSICAL EXAM:  en: NAD, calm  Cards: RRR, +S1/S2, no M/G/R  Resp: Fine B/L Base crackles left >right    GI: soft, NT/ND, NABS  : no CVA tenderness  Extremities: +2 LE /pedal edema B/L          LABS:  06-23    134<L>  |  95<L>  |  62<H>  ----------------------------<  103<H>  3.9   |  24  |  2.18<H>    Ca    9.4      23 Jun 2017 05:54  Phos  2.8     06-23  Mg     2.2     06-23      Creatinine Trend: 2.18 <--, 2.01 <--, 1.89 <--, 1.62 <--, 1.56 <--, 1.67 <--, 1.81 <--, 1.81 <--    Urine Studies:      RADIOLOGY & ADDITIONAL STUDIES: [facility] NEPHROLOGY- PROGRESS NOTE, Follow up     Patient is a 89y Female with severe systolic CHF (s/p BiV AICD 2010 EF 25%), CKD-3 a/w CHF exacerbation and NORM on CKD-3 with hyponatremia and hypokalemia.   Allergy:No Known Allergies    Hospital Medications:   MEDICATIONS  (STANDING):  aspirin enteric coated 81milliGRAM(s) Oral daily  insulin lispro (HumaLOG) corrective regimen sliding scale  SubCutaneous three times a day before meals  dextrose 5%. 1000milliLiter(s) IV Continuous <Continuous>  dextrose 50% Injectable 12.5Gram(s) IV Push once  dextrose 50% Injectable 25Gram(s) IV Push once  dextrose 50% Injectable 25Gram(s) IV Push once  heparin  Injectable 5000Unit(s) SubCutaneous every 8 hours  pantoprazole    Tablet 40milliGRAM(s) Oral before breakfast  furosemide    Tablet 20milliGRAM(s) Oral four times a day    REVIEW OF SYSTEMS:   No c/o sob cp palpitations abd pain    VITALS:  T(F): 97.8, Max: 97.9 (06-22 @ 15:41)  HR: 70  BP: 106/75  RR: 16  SpO2: 99%  Wt(kg): --    I & Os for current day (as of 06-23 @ 14:04)  =============================================  IN: 342.8 ml / OUT: 575 ml / NET: -232.2 ml        PHYSICAL EXAM:  en: NAD, calm  Cards: RRR, +S1/S2, no M/G/R  Resp: Fine B/L Base crackles left >right    GI: soft, NT/ND, NABS  : no CVA tenderness  Extremities: +2 LE /pedal edema B/L          LABS:  06-23    134<L>  |  95<L>  |  62<H>  ----------------------------<  103<H>  3.9   |  24  |  2.18<H>    Ca    9.4      23 Jun 2017 05:54  Phos  2.8     06-23  Mg     2.2     06-23      Creatinine Trend: 2.18 <--, 2.01 <--, 1.89 <--, 1.62 <--, 1.56 <--, 1.67 <--, 1.81 <--, 1.81 <--    Urine Studies:      RADIOLOGY & ADDITIONAL STUDIES:

## 2017-06-23 NOTE — PROGRESS NOTE ADULT - PROBLEM SELECTOR PLAN 1
changed from 40mg po to 20mg po 4xdaily   Previous SCr 1.45 on 3/9/17  NORM on CKD-3 in the setting of CHF exacerbation; cardio-renal syndrome. Renal function improved on dobutamine assisted diuresis. Avoid nephrotoxins/ NSAIDs/ RCA. Monitor BMP. Previous SCr 1.45 on 3/9/17  NORM on CKD-3 in the setting of CHF exacerbation; cardio-renal syndrome. Renal function improved on dobutamine assisted diuresis but now with worsening renal function; likely end stage HF.   Avoid nephrotoxins/ NSAIDs/ RCA. Monitor BMP.

## 2017-06-23 NOTE — PROGRESS NOTE ADULT - PROBLEM SELECTOR PROBLEM 5
Hypokalemia

## 2017-06-23 NOTE — PROGRESS NOTE ADULT - ASSESSMENT
Patient is a 89 English speaking female with severe systolic CHF (s/p BiV AICD 2010 EF 25%), CKD-3 a/w CHF exacerbation and NORM on CKD-3 with hyponatremia and hypokalemia.

## 2017-06-23 NOTE — PROGRESS NOTE ADULT - PROBLEM SELECTOR PROBLEM 3
Acute on chronic systolic congestive heart failure
Prophylactic measure
Type 2 diabetes mellitus without complication, without long-term current use of insulin

## 2017-06-23 NOTE — PROGRESS NOTE ADULT - PROBLEM SELECTOR PROBLEM 4
Hyponatremia

## 2017-06-23 NOTE — PROGRESS NOTE ADULT - PROBLEM SELECTOR PLAN 2
NORM on CKD  creatinine improving with diuresis  f/u BMP  Jun Ma/ Herman
NORM on CKD, Cr 1.62  Jun Ma/ Herman
NORM on CKD, Cr 1.8. Monitor BMP  [place] Dr. Ma/ Herman
NORM on CKD, Elevated creatinine. Hold lasix today  [place] Dr. Ma/ Herman
NORM on CKD, creatinine improving with diuresis 2.23 to 1.5 today  Jun Ma/ Herman
Previous SCr 1.45 on 3/9/17. Urine pr<5.   Pt with CKD-3 in the setting of HTN/ CHF
last HbA1C level 6.1  -c/w HSS + accuchecks  -f/u HbA1C
Previous SCr 1.45 on 3/9/17. Urine pr<5.   Pt with CKD-3 in the setting of HTN/ CHF

## 2017-06-23 NOTE — PROGRESS NOTE ADULT - PROVIDER SPECIALTY LIST ADULT
Cardiology
Internal Medicine
Nephrology
Internal Medicine
Internal Medicine
Nephrology

## 2017-06-23 NOTE — PROGRESS NOTE ADULT - PROBLEM SELECTOR PROBLEM 2
CKD (chronic kidney disease) stage 3, GFR 30-59 ml/min
NORM (acute kidney injury)
Type 2 diabetes mellitus without complication, without long-term current use of insulin

## 2017-06-23 NOTE — PROGRESS NOTE ADULT - ATTENDING COMMENTS
Patient seen and examined. Agree with plan above.  Note edited as necessary.  Pt with NORM on CKD-3 in the setting of CHF exacerbation; likely cardio-renal syndrome. Pt with Severe global LV dysfunction with grade-3 diastolic dysfunction. Pt likley with end stage HF. f/u cardiology    [facility] NEPHROLOGY  Davonte Ma M.D.  Markell Buckner D.O.  Carina Sutton M.D.  Kylie Mack, MSN, ANP-C  [phone]    -[address] [place], [place] [id number]

## 2017-06-23 NOTE — PROGRESS NOTE ADULT - PROBLEM SELECTOR PLAN 4
hypervolemic hyponatremia- resolved. c/w free water restriction  <1L / day. Monitor serum sodium. hypervolemic hyponatremia- mild   c/w free water restriction  <1L / day. Monitor serum sodium.

## 2017-06-23 NOTE — PROGRESS NOTE ADULT - PROBLEM SELECTOR PLAN 3
Improve score of 3 (c/w heparin SC)  GI ppx
Management as per Cardiology; Dr. Gomes.
c/w HSS   monitor accuchecks  HbA1C 7.0
c/w HSS, monitor accu checks  HbA1C 7.0
Pt on dobutamine assisted diuresis with Lasix gtt. Now off Lasix gtt. Consider PO Lasix if BP tolerates.   Management as per Cardiology; Dr. Gomes.

## 2017-06-27 DIAGNOSIS — E87.1 HYPO-OSMOLALITY AND HYPONATREMIA: ICD-10-CM

## 2017-06-27 DIAGNOSIS — N18.3 CHRONIC KIDNEY DISEASE, STAGE 3 (MODERATE): ICD-10-CM

## 2017-06-27 DIAGNOSIS — Z95.810 PRESENCE OF AUTOMATIC (IMPLANTABLE) CARDIAC DEFIBRILLATOR: ICD-10-CM

## 2017-06-27 DIAGNOSIS — B35.1 TINEA UNGUIUM: ICD-10-CM

## 2017-06-27 DIAGNOSIS — I50.23 ACUTE ON CHRONIC SYSTOLIC (CONGESTIVE) HEART FAILURE: ICD-10-CM

## 2017-06-27 DIAGNOSIS — I42.9 CARDIOMYOPATHY, UNSPECIFIED: ICD-10-CM

## 2017-06-27 DIAGNOSIS — N17.9 ACUTE KIDNEY FAILURE, UNSPECIFIED: ICD-10-CM

## 2017-06-27 DIAGNOSIS — L60.0 INGROWING NAIL: ICD-10-CM

## 2017-06-27 DIAGNOSIS — E87.6 HYPOKALEMIA: ICD-10-CM

## 2017-06-27 DIAGNOSIS — I73.9 PERIPHERAL VASCULAR DISEASE, UNSPECIFIED: ICD-10-CM

## 2017-06-27 DIAGNOSIS — Z66 DO NOT RESUSCITATE: ICD-10-CM

## 2017-06-27 DIAGNOSIS — E11.22 TYPE 2 DIABETES MELLITUS WITH DIABETIC CHRONIC KIDNEY DISEASE: ICD-10-CM

## 2017-06-27 DIAGNOSIS — I13.0 HYPERTENSIVE HEART AND CHRONIC KIDNEY DISEASE WITH HEART FAILURE AND STAGE 1 THROUGH STAGE 4 CHRONIC KIDNEY DISEASE, OR UNSPECIFIED CHRONIC KIDNEY DISEASE: ICD-10-CM

## 2017-07-29 ENCOUNTER — INPATIENT (INPATIENT)
Facility: HOSPITAL | Age: 82
LOS: 4 days | Discharge: HOME | DRG: 291 | End: 2017-08-03
Attending: INTERNAL MEDICINE | Admitting: INTERNAL MEDICINE
Payer: MEDICARE

## 2017-07-29 VITALS
SYSTOLIC BLOOD PRESSURE: 130 MMHG | OXYGEN SATURATION: 97 % | WEIGHT: 132.94 LBS | HEIGHT: 61 IN | RESPIRATION RATE: 16 BRPM | DIASTOLIC BLOOD PRESSURE: 88 MMHG | HEART RATE: 115 BPM

## 2017-07-29 DIAGNOSIS — I50.23 ACUTE ON CHRONIC SYSTOLIC (CONGESTIVE) HEART FAILURE: ICD-10-CM

## 2017-07-29 DIAGNOSIS — Z95.0 PRESENCE OF CARDIAC PACEMAKER: Chronic | ICD-10-CM

## 2017-07-29 LAB
ALBUMIN SERPL ELPH-MCNC: 3 G/DL — LOW (ref 3.5–5)
ALP SERPL-CCNC: 145 U/L — HIGH (ref 40–120)
ALT FLD-CCNC: 19 U/L DA — SIGNIFICANT CHANGE UP (ref 10–60)
ANION GAP SERPL CALC-SCNC: 17 MMOL/L — SIGNIFICANT CHANGE UP (ref 5–17)
AST SERPL-CCNC: 42 U/L — HIGH (ref 10–40)
BASOPHILS # BLD AUTO: 0.1 K/UL — SIGNIFICANT CHANGE UP (ref 0–0.2)
BASOPHILS NFR BLD AUTO: 1.1 % — SIGNIFICANT CHANGE UP (ref 0–2)
BILIRUB SERPL-MCNC: 2.2 MG/DL — HIGH (ref 0.2–1.2)
BUN SERPL-MCNC: 42 MG/DL — HIGH (ref 7–18)
CALCIUM SERPL-MCNC: 8.9 MG/DL — SIGNIFICANT CHANGE UP (ref 8.4–10.5)
CHLORIDE SERPL-SCNC: 97 MMOL/L — SIGNIFICANT CHANGE UP (ref 96–108)
CO2 SERPL-SCNC: 18 MMOL/L — LOW (ref 22–31)
CREAT SERPL-MCNC: 1.52 MG/DL — HIGH (ref 0.5–1.3)
EOSINOPHIL # BLD AUTO: 0.1 K/UL — SIGNIFICANT CHANGE UP (ref 0–0.5)
EOSINOPHIL NFR BLD AUTO: 1.4 % — SIGNIFICANT CHANGE UP (ref 0–6)
GLUCOSE SERPL-MCNC: 123 MG/DL — HIGH (ref 70–99)
HCT VFR BLD CALC: 49 % — HIGH (ref 34.5–45)
HGB BLD-MCNC: 16.2 G/DL — HIGH (ref 11.5–15.5)
LYMPHOCYTES # BLD AUTO: 1.1 K/UL — SIGNIFICANT CHANGE UP (ref 1–3.3)
LYMPHOCYTES # BLD AUTO: 17.8 % — SIGNIFICANT CHANGE UP (ref 13–44)
MAGNESIUM SERPL-MCNC: 2.1 MG/DL — SIGNIFICANT CHANGE UP (ref 1.6–2.6)
MCHC RBC-ENTMCNC: 33 GM/DL — SIGNIFICANT CHANGE UP (ref 32–36)
MCHC RBC-ENTMCNC: 33.4 PG — SIGNIFICANT CHANGE UP (ref 27–34)
MCV RBC AUTO: 101.2 FL — HIGH (ref 80–100)
MONOCYTES # BLD AUTO: 0.6 K/UL — SIGNIFICANT CHANGE UP (ref 0–0.9)
MONOCYTES NFR BLD AUTO: 9.5 % — SIGNIFICANT CHANGE UP (ref 2–14)
NEUTROPHILS # BLD AUTO: 4.2 K/UL — SIGNIFICANT CHANGE UP (ref 1.8–7.4)
NEUTROPHILS NFR BLD AUTO: 70.3 % — SIGNIFICANT CHANGE UP (ref 43–77)
NT-PROBNP SERPL-SCNC: HIGH PG/ML (ref 0–450)
PLATELET # BLD AUTO: 176 K/UL — SIGNIFICANT CHANGE UP (ref 150–400)
POTASSIUM SERPL-MCNC: 3.3 MMOL/L — LOW (ref 3.5–5.3)
POTASSIUM SERPL-SCNC: 3.3 MMOL/L — LOW (ref 3.5–5.3)
PROT SERPL-MCNC: 7.6 G/DL — SIGNIFICANT CHANGE UP (ref 6–8.3)
RBC # BLD: 4.84 M/UL — SIGNIFICANT CHANGE UP (ref 3.8–5.2)
RBC # FLD: 14.7 % — HIGH (ref 10.3–14.5)
SODIUM SERPL-SCNC: 132 MMOL/L — LOW (ref 135–145)
TROPONIN I SERPL-MCNC: 0.2 NG/ML — HIGH (ref 0–0.04)
WBC # BLD: 5.9 K/UL — SIGNIFICANT CHANGE UP (ref 3.8–10.5)
WBC # FLD AUTO: 5.9 K/UL — SIGNIFICANT CHANGE UP (ref 3.8–10.5)

## 2017-07-29 PROCEDURE — 99284 EMERGENCY DEPT VISIT MOD MDM: CPT

## 2017-07-29 PROCEDURE — 71010: CPT | Mod: 26

## 2017-07-29 RX ORDER — ASPIRIN/CALCIUM CARB/MAGNESIUM 324 MG
325 TABLET ORAL ONCE
Qty: 0 | Refills: 0 | Status: COMPLETED | OUTPATIENT
Start: 2017-07-29 | End: 2017-07-29

## 2017-07-29 RX ORDER — METOPROLOL TARTRATE 50 MG
5 TABLET ORAL ONCE
Qty: 0 | Refills: 0 | Status: DISCONTINUED | OUTPATIENT
Start: 2017-07-29 | End: 2017-07-30

## 2017-07-29 RX ORDER — METOPROLOL TARTRATE 50 MG
5 TABLET ORAL ONCE
Qty: 0 | Refills: 0 | Status: COMPLETED | OUTPATIENT
Start: 2017-07-29 | End: 2017-07-29

## 2017-07-29 RX ORDER — FUROSEMIDE 40 MG
40 TABLET ORAL ONCE
Qty: 0 | Refills: 0 | Status: COMPLETED | OUTPATIENT
Start: 2017-07-29 | End: 2017-07-29

## 2017-07-29 RX ORDER — POTASSIUM CHLORIDE 20 MEQ
40 PACKET (EA) ORAL ONCE
Qty: 0 | Refills: 0 | Status: COMPLETED | OUTPATIENT
Start: 2017-07-29 | End: 2017-07-30

## 2017-07-29 RX ORDER — METOPROLOL TARTRATE 50 MG
25 TABLET ORAL ONCE
Qty: 0 | Refills: 0 | Status: DISCONTINUED | OUTPATIENT
Start: 2017-07-29 | End: 2017-07-30

## 2017-07-29 RX ADMIN — Medication 5 MILLIGRAM(S): at 20:14

## 2017-07-29 RX ADMIN — Medication 40 MILLIGRAM(S): at 20:11

## 2017-07-29 RX ADMIN — Medication 325 MILLIGRAM(S): at 20:11

## 2017-07-29 NOTE — ED PROVIDER NOTE - OBJECTIVE STATEMENT
89 y/o female with a PMHx of heart failure on a high dose of Furosemide, Cardiomyopathy, HTN, PVD and DM presents to ED BIB grandson c/o B/L LE swelling x 1 week. Grandson also reports productive cough with yellow sputum. Pt noter her LE swelling has not resolved with increase dose of diaphoretic. She denies any chest pain, shortness of breath, abd pain and any other symptoms the moment.

## 2017-07-29 NOTE — ED ADULT NURSE NOTE - OBJECTIVE STATEMENT
As per grandson, pt is coughing up yellow sputum x 1 week, has pedal edema b/l  Pt appears to be very dehydrated, dry mucuous membranes, skin dry

## 2017-07-29 NOTE — ED PROVIDER NOTE - CARE PLAN
Principal Discharge DX:	Acute on chronic systolic congestive heart failure  Secondary Diagnosis:	NSTEMI, initial episode of care

## 2017-07-29 NOTE — ED ADULT TRIAGE NOTE - CHIEF COMPLAINT QUOTE
as per the porter son pt c/o cough and spitting yellow sputum for 1 week . pt has b/l leg swelling. pt denies any resp distress

## 2017-07-29 NOTE — ED PROVIDER NOTE - MEDICAL DECISION MAKING DETAILS
89 y/o female with multiple comorbidities and LE swelling.  Will get EKG, CRX, labs, diurese, UA. Pt likely will require admission.

## 2017-07-30 DIAGNOSIS — R06.02 SHORTNESS OF BREATH: ICD-10-CM

## 2017-07-30 DIAGNOSIS — Z29.9 ENCOUNTER FOR PROPHYLACTIC MEASURES, UNSPECIFIED: ICD-10-CM

## 2017-07-30 DIAGNOSIS — R74.8 ABNORMAL LEVELS OF OTHER SERUM ENZYMES: ICD-10-CM

## 2017-07-30 DIAGNOSIS — Z86.79 PERSONAL HISTORY OF OTHER DISEASES OF THE CIRCULATORY SYSTEM: ICD-10-CM

## 2017-07-30 DIAGNOSIS — I50.23 ACUTE ON CHRONIC SYSTOLIC (CONGESTIVE) HEART FAILURE: ICD-10-CM

## 2017-07-30 DIAGNOSIS — N18.9 CHRONIC KIDNEY DISEASE, UNSPECIFIED: ICD-10-CM

## 2017-07-30 DIAGNOSIS — E11.9 TYPE 2 DIABETES MELLITUS WITHOUT COMPLICATIONS: ICD-10-CM

## 2017-07-30 DIAGNOSIS — I48.91 UNSPECIFIED ATRIAL FIBRILLATION: ICD-10-CM

## 2017-07-30 LAB
ANION GAP SERPL CALC-SCNC: 19 MMOL/L — HIGH (ref 5–17)
ANION GAP SERPL CALC-SCNC: 21 MMOL/L — HIGH (ref 5–17)
APTT BLD: >200 SEC — CRITICAL HIGH (ref 27.5–37.4)
BUN SERPL-MCNC: 45 MG/DL — HIGH (ref 7–18)
BUN SERPL-MCNC: 48 MG/DL — HIGH (ref 7–18)
CALCIUM SERPL-MCNC: 9.4 MG/DL — SIGNIFICANT CHANGE UP (ref 8.4–10.5)
CALCIUM SERPL-MCNC: 9.5 MG/DL — SIGNIFICANT CHANGE UP (ref 8.4–10.5)
CHLORIDE SERPL-SCNC: 95 MMOL/L — LOW (ref 96–108)
CHLORIDE SERPL-SCNC: 96 MMOL/L — SIGNIFICANT CHANGE UP (ref 96–108)
CHLORIDE UR-SCNC: 86 MMOL/L — SIGNIFICANT CHANGE UP (ref 55–125)
CHOLEST SERPL-MCNC: 124 MG/DL — SIGNIFICANT CHANGE UP (ref 10–199)
CK MB BLD-MCNC: 3 % — SIGNIFICANT CHANGE UP (ref 0–3.5)
CK MB BLD-MCNC: 5.3 % — HIGH (ref 0–3.5)
CK MB CFR SERPL CALC: 2.9 NG/ML — SIGNIFICANT CHANGE UP (ref 0–3.6)
CK MB CFR SERPL CALC: 3.7 NG/ML — HIGH (ref 0–3.6)
CK SERPL-CCNC: 59 U/L — SIGNIFICANT CHANGE UP (ref 21–215)
CK SERPL-CCNC: 70 U/L — SIGNIFICANT CHANGE UP (ref 21–215)
CK SERPL-CCNC: 98 U/L — SIGNIFICANT CHANGE UP (ref 21–215)
CO2 SERPL-SCNC: 16 MMOL/L — LOW (ref 22–31)
CO2 SERPL-SCNC: 21 MMOL/L — LOW (ref 22–31)
CREAT ?TM UR-MCNC: 53 MG/DL — SIGNIFICANT CHANGE UP
CREAT SERPL-MCNC: 1.85 MG/DL — HIGH (ref 0.5–1.3)
CREAT SERPL-MCNC: 2.28 MG/DL — HIGH (ref 0.5–1.3)
GLUCOSE SERPL-MCNC: 77 MG/DL — SIGNIFICANT CHANGE UP (ref 70–99)
GLUCOSE SERPL-MCNC: 84 MG/DL — SIGNIFICANT CHANGE UP (ref 70–99)
HBA1C BLD-MCNC: 6.4 % — HIGH (ref 4–5.6)
HCT VFR BLD CALC: 52.6 % — HIGH (ref 34.5–45)
HDLC SERPL-MCNC: 47 MG/DL — SIGNIFICANT CHANGE UP (ref 40–125)
HGB BLD-MCNC: 17.2 G/DL — HIGH (ref 11.5–15.5)
INR BLD: 2.25 RATIO — HIGH (ref 0.88–1.16)
LIPID PNL WITH DIRECT LDL SERPL: 60 MG/DL — SIGNIFICANT CHANGE UP
MAGNESIUM SERPL-MCNC: 2.2 MG/DL — SIGNIFICANT CHANGE UP (ref 1.6–2.6)
MCHC RBC-ENTMCNC: 32.7 GM/DL — SIGNIFICANT CHANGE UP (ref 32–36)
MCHC RBC-ENTMCNC: 33.6 PG — SIGNIFICANT CHANGE UP (ref 27–34)
MCV RBC AUTO: 102.7 FL — HIGH (ref 80–100)
OSMOLALITY UR: 483 MOS/KG — SIGNIFICANT CHANGE UP (ref 50–1200)
PHOSPHATE 24H UR-MCNC: 60 MG/DL — SIGNIFICANT CHANGE UP
PHOSPHATE SERPL-MCNC: 3.8 MG/DL — SIGNIFICANT CHANGE UP (ref 2.5–4.5)
PLATELET # BLD AUTO: 150 K/UL — SIGNIFICANT CHANGE UP (ref 150–400)
POTASSIUM SERPL-MCNC: 4 MMOL/L — SIGNIFICANT CHANGE UP (ref 3.5–5.3)
POTASSIUM SERPL-MCNC: 4.2 MMOL/L — SIGNIFICANT CHANGE UP (ref 3.5–5.3)
POTASSIUM SERPL-SCNC: 4 MMOL/L — SIGNIFICANT CHANGE UP (ref 3.5–5.3)
POTASSIUM SERPL-SCNC: 4.2 MMOL/L — SIGNIFICANT CHANGE UP (ref 3.5–5.3)
POTASSIUM UR-SCNC: 25 MMOL/L — SIGNIFICANT CHANGE UP (ref 25–125)
PROTHROM AB SERPL-ACNC: 24.9 SEC — HIGH (ref 9.8–12.7)
RBC # BLD: 5.12 M/UL — SIGNIFICANT CHANGE UP (ref 3.8–5.2)
RBC # FLD: 15.1 % — HIGH (ref 10.3–14.5)
SODIUM SERPL-SCNC: 133 MMOL/L — LOW (ref 135–145)
SODIUM SERPL-SCNC: 135 MMOL/L — SIGNIFICANT CHANGE UP (ref 135–145)
SODIUM UR-SCNC: 87 MMOL/L — SIGNIFICANT CHANGE UP (ref 40–220)
TOTAL CHOLESTEROL/HDL RATIO MEASUREMENT: 2.6 RATIO — LOW (ref 3.3–7.1)
TRIGL SERPL-MCNC: 84 MG/DL — SIGNIFICANT CHANGE UP (ref 10–149)
TROPONIN I SERPL-MCNC: 0.42 NG/ML — HIGH (ref 0–0.04)
TROPONIN I SERPL-MCNC: 0.49 NG/ML — HIGH (ref 0–0.04)
TROPONIN I SERPL-MCNC: 0.67 NG/ML — HIGH (ref 0–0.04)
VIT B12 SERPL-MCNC: >2000 PG/ML — HIGH (ref 243–894)
WBC # BLD: 10.5 K/UL — SIGNIFICANT CHANGE UP (ref 3.8–10.5)
WBC # FLD AUTO: 10.5 K/UL — SIGNIFICANT CHANGE UP (ref 3.8–10.5)

## 2017-07-30 RX ORDER — MIDODRINE HYDROCHLORIDE 2.5 MG/1
5 TABLET ORAL ONCE
Qty: 0 | Refills: 0 | Status: COMPLETED | OUTPATIENT
Start: 2017-07-30 | End: 2017-07-30

## 2017-07-30 RX ORDER — HEPARIN SODIUM 5000 [USP'U]/ML
INJECTION INTRAVENOUS; SUBCUTANEOUS
Qty: 25000 | Refills: 0 | Status: DISCONTINUED | OUTPATIENT
Start: 2017-07-30 | End: 2017-07-30

## 2017-07-30 RX ORDER — FUROSEMIDE 40 MG
40 TABLET ORAL DAILY
Qty: 0 | Refills: 0 | Status: DISCONTINUED | OUTPATIENT
Start: 2017-07-30 | End: 2017-07-31

## 2017-07-30 RX ORDER — ONDANSETRON 8 MG/1
4 TABLET, FILM COATED ORAL ONCE
Qty: 0 | Refills: 0 | Status: COMPLETED | OUTPATIENT
Start: 2017-07-30 | End: 2017-07-30

## 2017-07-30 RX ORDER — HEPARIN SODIUM 5000 [USP'U]/ML
5000 INJECTION INTRAVENOUS; SUBCUTANEOUS EVERY 8 HOURS
Qty: 0 | Refills: 0 | Status: DISCONTINUED | OUTPATIENT
Start: 2017-07-30 | End: 2017-07-30

## 2017-07-30 RX ORDER — HEPARIN SODIUM 5000 [USP'U]/ML
4500 INJECTION INTRAVENOUS; SUBCUTANEOUS ONCE
Qty: 0 | Refills: 0 | Status: COMPLETED | OUTPATIENT
Start: 2017-07-30 | End: 2017-07-30

## 2017-07-30 RX ORDER — ASPIRIN/CALCIUM CARB/MAGNESIUM 324 MG
81 TABLET ORAL DAILY
Qty: 0 | Refills: 0 | Status: DISCONTINUED | OUTPATIENT
Start: 2017-07-30 | End: 2017-08-03

## 2017-07-30 RX ORDER — METOPROLOL TARTRATE 50 MG
25 TABLET ORAL
Qty: 0 | Refills: 0 | Status: DISCONTINUED | OUTPATIENT
Start: 2017-07-30 | End: 2017-07-31

## 2017-07-30 RX ORDER — INSULIN LISPRO 100/ML
VIAL (ML) SUBCUTANEOUS
Qty: 0 | Refills: 0 | Status: DISCONTINUED | OUTPATIENT
Start: 2017-07-30 | End: 2017-08-03

## 2017-07-30 RX ORDER — FUROSEMIDE 40 MG
40 TABLET ORAL DAILY
Qty: 0 | Refills: 0 | Status: DISCONTINUED | OUTPATIENT
Start: 2017-07-30 | End: 2017-07-30

## 2017-07-30 RX ORDER — HEPARIN SODIUM 5000 [USP'U]/ML
2000 INJECTION INTRAVENOUS; SUBCUTANEOUS EVERY 6 HOURS
Qty: 0 | Refills: 0 | Status: DISCONTINUED | OUTPATIENT
Start: 2017-07-30 | End: 2017-07-31

## 2017-07-30 RX ORDER — HEPARIN SODIUM 5000 [USP'U]/ML
4500 INJECTION INTRAVENOUS; SUBCUTANEOUS EVERY 6 HOURS
Qty: 0 | Refills: 0 | Status: DISCONTINUED | OUTPATIENT
Start: 2017-07-30 | End: 2017-07-31

## 2017-07-30 RX ORDER — FUROSEMIDE 40 MG
40 TABLET ORAL ONCE
Qty: 0 | Refills: 0 | Status: COMPLETED | OUTPATIENT
Start: 2017-07-30 | End: 2017-07-30

## 2017-07-30 RX ADMIN — ONDANSETRON 4 MILLIGRAM(S): 8 TABLET, FILM COATED ORAL at 21:16

## 2017-07-30 RX ADMIN — ONDANSETRON 4 MILLIGRAM(S): 8 TABLET, FILM COATED ORAL at 16:16

## 2017-07-30 RX ADMIN — HEPARIN SODIUM 4500 UNIT(S): 5000 INJECTION INTRAVENOUS; SUBCUTANEOUS at 03:35

## 2017-07-30 RX ADMIN — MIDODRINE HYDROCHLORIDE 5 MILLIGRAM(S): 2.5 TABLET ORAL at 21:16

## 2017-07-30 RX ADMIN — Medication 40 MILLIEQUIVALENT(S): at 03:36

## 2017-07-30 RX ADMIN — HEPARIN SODIUM 1100 UNIT(S)/HR: 5000 INJECTION INTRAVENOUS; SUBCUTANEOUS at 03:43

## 2017-07-30 RX ADMIN — Medication 40 MILLIGRAM(S): at 03:35

## 2017-07-30 RX ADMIN — Medication 81 MILLIGRAM(S): at 13:17

## 2017-07-30 RX ADMIN — HEPARIN SODIUM 0 UNIT(S)/HR: 5000 INJECTION INTRAVENOUS; SUBCUTANEOUS at 11:26

## 2017-07-30 NOTE — CONSULT NOTE ADULT - SUBJECTIVE AND OBJECTIVE BOX
PULMONARY CONSULT NOTE      ERICA MORALES  MRN-[id number]      88 y/o Turkish speaking F AAO x 3 from home lives with daughter ambulate with cane at baseline, PMH of severe systolic CHF (s/p BiV AICD 2010 EF 25% with G3 DD), DM (diet controlled) PVD and HTN. Pt presented to ED for progressive bilateral leg swelling, increased dyspnea on exertion, orthopnea over past 3 weeks as per daughter. Patient stated that she always has this SOB at baseline and can not ambulate much due to SOB. She has to sleep with 2-3 pillows at night and can not lie flat at all. Patient has multiple admission before with similar complaint and DDx with CHF exacerbation, previous admission in June 2017.  Pt denies fevers, chills, productive cough, abd pain, chest pain, nausea, vomiting, diarrhea, urinary symptoms or any other complaints.      HISTORY OF PRESENT ILLNESS:    MEDICATIONS  (STANDING):  aspirin  chewable 81 milliGRAM(s) Oral daily  furosemide   Injectable 40 milliGRAM(s) IV Push daily  metoprolol 25 milliGRAM(s) Oral two times a day  insulin lispro (HumaLOG) corrective regimen sliding scale   SubCutaneous three times a day before meals      MEDICATIONS  (PRN):  heparin  Injectable 4500 Unit(s) IV Push every 6 hours PRN For aPTT less than 40  heparin  Injectable 2000 Unit(s) IV Push every 6 hours PRN For aPTT between 40 - 57      Allergies    No Known Allergies    Intolerances        PAST MEDICAL & SURGICAL HISTORY:  Type 2 diabetes mellitus without complication, without long-term current use of insulin  Acute on chronic systolic congestive heart failure  Mitral Regurgitation  Cardiomyopathy  History of Hypertension  PVD (Peripheral Vascular Disease)  Artificial pacemaker      FAMILY HISTORY:  No pertinent family history in first degree relatives      SOCIAL HISTORY  Smoking History:     REVIEW OF SYSTEMS:    CONSTITUTIONAL:  No fevers, chills, sweats    HEENT:  Eyes:  No diplopia or blurred vision. ENT:  No earache, sore throat or runny nose.    CARDIOVASCULAR:  No pressure, squeezing, tightness, or heaviness about the chest; no palpitations.    RESPIRATORY:  Per HPI    GASTROINTESTINAL:  No abdominal pain, nausea, vomiting or diarrhea.    GENITOURINARY:  No dysuria, frequency or urgency.    NEUROLOGIC:  No paresthesias, fasciculations, seizures or weakness.    PSYCHIATRIC:  No disorder of thought or mood.    Vital Signs Last 24 Hrs  T(C): 36.4 (30 Jul 2017 11:23), Max: 36.4 (30 Jul 2017 11:23)  T(F): 97.6 (30 Jul 2017 11:23), Max: 97.6 (30 Jul 2017 11:23)  HR: 60 (30 Jul 2017 11:23) (60 - 127)  BP: 111/53 (30 Jul 2017 11:23) (85/68 - 130/88)  BP(mean): --  RR: 23 (30 Jul 2017 07:54) (16 - 23)  SpO2: 98% (30 Jul 2017 07:54) (97% - 100%)  I&O's Detail      PHYSICAL EXAMINATION:    GENERAL: The patient is a well-developed, well-nourished _____in no apparent distress.     HEENT: Head is normocephalic and atraumatic. Extraocular muscles are intact. Mucous membranes are moist.     NECK: Supple.     LUNGS: Clear to auscultation without wheezing, rales, or rhonchi. Respirations unlabored    HEART: Regular rate and rhythm without murmur.    ABDOMEN: Soft, nontender, and nondistended.  No hepatosplenomegaly is noted.    EXTREMITIES: Without any cyanosis, clubbing, rash, lesions or edema.    NEUROLOGIC: Grossly intact.      LABS:                        16.2   5.9   )-----------( 176      ( 29 Jul 2017 19:08 )             49.0     07-30    135  |  95<L>  |  45<H>  ----------------------------<  84  4.0   |  21<L>  |  1.85<H>    Ca    9.5      30 Jul 2017 05:28  Phos  3.8     07-30  Mg     2.2     07-30    TPro  7.6  /  Alb  3.0<L>  /  TBili  2.2<H>  /  DBili  x   /  AST  42<H>  /  ALT  19  /  AlkPhos  145<H>  07-29    PTT - ( 30 Jul 2017 10:20 )  PTT:>200.0 sec      CARDIAC MARKERS ( 30 Jul 2017 05:28 )  0.486 ng/mL / x     / 59 U/L / x     / x      CARDIAC MARKERS ( 30 Jul 2017 03:18 )  0.425 ng/mL / x     / 98 U/L / x     / 2.9 ng/mL  CARDIAC MARKERS ( 29 Jul 2017 19:08 )  0.195 ng/mL / x     / x     / x     / x            Serum Pro-Brain Natriuretic Peptide: [id number] pg/mL (07-29-17 @ 19:08)          MICROBIOLOGY:    RADIOLOGY & ADDITIONAL STUDIES:     Xray Chest 1 View AP-PORTABLE IMMEDIATE (07.29.17 @ 19:31) >    FINDINGS/  IMPRESSION:   Left-sided triple lead ICD obscures the left lower lung zone.  There is mild diffuse interstitial prominence, similar to the prior   study. Scattered small opacities are also similar to the prior study.  No pneumothorax or significant pleural effusion.   The cardiac silhouette is notaccurately assessed by AP technique.   Calcified aorta.     Transthoracic Echocardiogram (06.19.17 @ 08:00) >  CONCLUSIONS:  1. Severe mitral regurgitation.  2. Moderate concentric left ventricular hypertrophy.  3. Severe global left ventricular dysfunction.  4. Grade III diastolic dysfunction  5. RV systolic pressure is mildly increased.=31mmhg  6. There is moderate-severe tricuspid regurgitation.            Ct scan chest:    ekg;    echo: PULMONARY CONSULT NOTE      ERICA MORALES  MRN-[id number]      89 y/o Setswana speaking F AAO x 3 from home lives with daughter ambulate with cane at baseline, PMH of severe systolic CHF (s/p BiV AICD 2010 EF 25% with G3 DD), DM (diet controlled) PVD and HTN. Pt presented to ED for progressive bilateral leg swelling, increased dyspnea on exertion, orthopnea over past 3 weeks as per daughter. Patient stated that she always has this SOB at baseline and can not ambulate much due to SOB. She has to sleep with 2-3 pillows at night and can not lie flat at all. Patient has multiple admission before with similar complaint and DDx with CHF exacerbation, previous admission in June 2017.  Pt denies fevers, chills, abd pain, chest pain, nausea, vomiting, diarrhea, urinary symptoms or any other complaints. Has cough productive of clear phlegm and leg edema.      HISTORY OF PRESENT ILLNESS:As above    MEDICATIONS  (STANDING):  aspirin  chewable 81 milliGRAM(s) Oral daily  furosemide   Injectable 40 milliGRAM(s) IV Push daily  metoprolol 25 milliGRAM(s) Oral two times a day  insulin lispro (HumaLOG) corrective regimen sliding scale   SubCutaneous three times a day before meals      MEDICATIONS  (PRN):  heparin  Injectable 4500 Unit(s) IV Push every 6 hours PRN For aPTT less than 40  heparin  Injectable 2000 Unit(s) IV Push every 6 hours PRN For aPTT between 40 - 57      Allergies    No Known Allergies    Intolerances        PAST MEDICAL & SURGICAL HISTORY:  Type 2 diabetes mellitus without complication, without long-term current use of insulin  Acute on chronic systolic congestive heart failure  Mitral Regurgitation  Cardiomyopathy  History of Hypertension  PVD (Peripheral Vascular Disease)  Artificial pacemaker      FAMILY HISTORY:  No pertinent family history in first degree relatives      SOCIAL HISTORY  Smoking History:     REVIEW OF SYSTEMS:    CONSTITUTIONAL:  No fevers, chills, sweats    HEENT:  Eyes:  No diplopia or blurred vision. ENT:  No earache, sore throat or runny nose.    CARDIOVASCULAR:  No pressure, squeezing, tightness, or heaviness about the chest; no palpitations.    RESPIRATORY:  Per HPI    GASTROINTESTINAL:  No abdominal pain, nausea, vomiting or diarrhea.    GENITOURINARY:  No dysuria, frequency or urgency.    NEUROLOGIC:  No paresthesias, fasciculations, seizures or weakness.    PSYCHIATRIC:  No disorder of thought or mood.    Vital Signs Last 24 Hrs  T(C): 36.4 (30 Jul 2017 11:23), Max: 36.4 (30 Jul 2017 11:23)  T(F): 97.6 (30 Jul 2017 11:23), Max: 97.6 (30 Jul 2017 11:23)  HR: 60 (30 Jul 2017 11:23) (60 - 127)  BP: 111/53 (30 Jul 2017 11:23) (85/68 - 130/88)  BP(mean): --  RR: 23 (30 Jul 2017 07:54) (16 - 23)  SpO2: 98% (30 Jul 2017 07:54) (97% - 100%)  I&O's Detail      PHYSICAL EXAMINATION:    GENERAL: The patient is a well-developed, well-nourished _____in no apparent distress.     HEENT: Head is normocephalic and atraumatic. Extraocular muscles are intact. Mucous membranes are moist.     NECK: Supple.     LUNGS: Rales bilaterally    HEART: Regular rate and rhythm without murmur.    ABDOMEN: Soft, nontender, and nondistended.  No hepatosplenomegaly is noted.    EXTREMITIES: Without any cyanosis, clubbing, rash, lesions or edema.    NEUROLOGIC: Grossly intact.      LABS:                        16.2   5.9   )-----------( 176      ( 29 Jul 2017 19:08 )             49.0     07-30    135  |  95<L>  |  45<H>  ----------------------------<  84  4.0   |  21<L>  |  1.85<H>    Ca    9.5      30 Jul 2017 05:28  Phos  3.8     07-30  Mg     2.2     07-30    TPro  7.6  /  Alb  3.0<L>  /  TBili  2.2<H>  /  DBili  x   /  AST  42<H>  /  ALT  19  /  AlkPhos  145<H>  07-29    PTT - ( 30 Jul 2017 10:20 )  PTT:>200.0 sec      CARDIAC MARKERS ( 30 Jul 2017 05:28 )  0.486 ng/mL / x     / 59 U/L / x     / x      CARDIAC MARKERS ( 30 Jul 2017 03:18 )  0.425 ng/mL / x     / 98 U/L / x     / 2.9 ng/mL  CARDIAC MARKERS ( 29 Jul 2017 19:08 )  0.195 ng/mL / x     / x     / x     / x            Serum Pro-Brain Natriuretic Peptide: [id number] pg/mL (07-29-17 @ 19:08)          MICROBIOLOGY:    RADIOLOGY & ADDITIONAL STUDIES:     Xray Chest 1 View AP-PORTABLE IMMEDIATE (07.29.17 @ 19:31) >    FINDINGS/  IMPRESSION:   Left-sided triple lead ICD obscures the left lower lung zone.  There is mild diffuse interstitial prominence, similar to the prior   study. Scattered small opacities are also similar to the prior study.  No pneumothorax or significant pleural effusion.   The cardiac silhouette is notaccurately assessed by AP technique.   Calcified aorta.     Transthoracic Echocardiogram (06.19.17 @ 08:00) >  CONCLUSIONS:  1. Severe mitral regurgitation.  2. Moderate concentric left ventricular hypertrophy.  3. Severe global left ventricular dysfunction.  4. Grade III diastolic dysfunction  5. RV systolic pressure is mildly increased.=31mmhg  6. There is moderate-severe tricuspid regurgitation.            Ct scan chest:    ekg;    echo:

## 2017-07-30 NOTE — H&P ADULT - HISTORY OF PRESENT ILLNESS
89 y/o Danish speaking F AAO x 3 from home lives with daughter ambulate with cane at baseline PMH of severe systolic CHF (s/p BiV AICD 2010 EF 25% with G3 DD), DM (diet controlled) PVD and HTN. Pt presented to ED for progressive bilateral leg swelling, increased dyspnea on exertion, orthopnea over past 3 weeks as per daughter. Patient stated that she always has this SOB at baseline and can not ambulate much due to SOB. She has to sleep with 2-3 pillows at night and can not lie flat at all. Patient has multiple admission before with similar complaint and DDx with CHF exacerbation, previous admission in June 2017.  Pt denies fevers, chills, productive cough, abd pain, chest pain, nausea, vomiting, diarrhea, urinary symptoms or any other complaints.

## 2017-07-30 NOTE — CONSULT NOTE ADULT - SUBJECTIVE AND OBJECTIVE BOX
Patient is a 89y old  Female who presents with a chief complaint of SOB     HPI:  89 y/o English speaking F AAO x 3 from home lives with daughter ambulate with cane at baseline PMH of severe systolic CHF (s/p BiV AICD 2010 EF 25% with G3 DD), DM (diet controlled) PVD and HTN. Pt presented to ED for progressive bilateral leg swelling, increased dyspnea on exertion, orthopnea over past 3 weeks as per daughter. Patient stated that she always has this SOB at baseline and can not ambulate much due to SOB. She has to sleep with 2-3 pillows at night and can not lie flat at all. Patient has multiple admission before with similar complaint and DDx with CHF exacerbation, previous admission in June 2017.  Pt denies fevers, chills, productive cough, abd pain, chest pain, nausea, vomiting, diarrhea, urinary symptoms or any other complaints.     PAST MEDICAL & SURGICAL HISTORY:  Type 2 diabetes mellitus without complication, without long-term current use of insulin  Acute on chronic systolic congestive heart failure  Peripheral Vascular Disease  Mitral Regurgitation  Cardiomyopathy  History of Hypertension  PVD (Peripheral Vascular Disease)  Artificial pacemaker      PREVIOUS DIAGNOSTIC TESTING:      ECHO  FINDINGS: 6/19/2017 Severe global left ventricular dysfunction.Grade III  diastolic dysfunctionRV systolic pressure is mildly  increased.=31mmhg  Severe  mitral regurgitation.          MEDICATIONS  (STANDING):  aspirin  chewable 81 milliGRAM(s) Oral daily  furosemide   Injectable 40 milliGRAM(s) IV Push daily  metoprolol 25 milliGRAM(s) Oral two times a day  heparin  Infusion.  Unit(s)/Hr (11 mL/Hr) IV Continuous <Continuous>    MEDICATIONS  (PRN):  heparin  Injectable 4500 Unit(s) IV Push every 6 hours PRN For aPTT less than 40  heparin  Injectable 2000 Unit(s) IV Push every 6 hours PRN For aPTT between 40 - 57      FAMILY HISTORY:  No pertinent family history in first degree relatives      SOCIAL HISTORY:Lives at home    CIGARETTES:Non Smoker    ALCOHOL:Denies    REVIEW OF SYSTEMS:  CONSTITUTIONAL: No weakness, fevers or chills  RESPIRATORY: No cough, wheezing, hemoptysis;Has shortness of breath on exertion and orthopnoea.   CARDIOVASCULAR: No chest pain or palpitations  GASTROINTESTINAL: No abdominal or epigastric pain. No nausea, vomiting, or hematemesis; No diarrhea or constipation. No melena or hematochezia.  NEUROLOGICAL: No numbness or weakness  All other review of systems is negative unless indicated above.      Vital Signs Last 24 Hrs  T(C): 35.4 (30 Jul 2017 06:34), Max: 36.1 (30 Jul 2017 01:27)  T(F): 95.8 (30 Jul 2017 06:34), Max: 96.9 (30 Jul 2017 01:27)  HR: 72 (30 Jul 2017 06:34) (72 - 115)  BP: 85/68 (30 Jul 2017 07:11) (85/68 - 130/88)  BP(mean): --  RR: 18 (30 Jul 2017 06:34) (16 - 18)  SpO2: 100% (30 Jul 2017 06:34) (97% - 100%)      PHYSICAL EXAM:  GENERAL: NAD, well-groomed, well-developed  HEAD:  Atraumatic, Normocephalic  EYES: EOMI, PERRLA, conjunctiva and sclera clear  ENMT: No tonsillar erythema, exudates, or enlargement; Moist mucous membranes, Good dentition, No lesions  NECK: Supple, No JVD, Normal thyroid  NERVOUS SYSTEM:  Alert & Oriented X3, Good concentration; Motor Strength 5/5 B/L upper and lower extremities; DTRs 2+ intact and symmetric  CHEST/LUNG: Clear to percussion bilaterally; No rales, rhonchi, wheezing, or rubs  HEART: Regular rate and rhythm; 3/6 systolic murmur No rubs, or gallops  ABDOMEN: Soft, Nontender, Nondistended; Bowel sounds present  EXTREMITIES:  2+ Peripheral Pulses, No clubbing, cyanosis,2+ edema  LYMPH: No lymphadenopathy noted  SKIN: No rashes or lesions      INTERPRETATION OF TELEMETRY:    ECG:    [facility]:     LABS:                        16.2   5.9   )-----------( 176      ( 29 Jul 2017 19:08 )             49.0     07-30    135  |  95<L>  |  45<H>  ----------------------------<  84  4.0   |  21<L>  |  1.85<H>    Ca    9.5      30 Jul 2017 05:28  Phos  3.8     07-30  Mg     2.2     07-30    TPro  7.6  /  Alb  3.0<L>  /  TBili  2.2<H>  /  DBili  x   /  AST  42<H>  /  ALT  19  /  AlkPhos  145<H>  07-29    CARDIAC MARKERS ( 30 Jul 2017 05:28 )  0.486 ng/mL / x     / 59 U/L / x     / x      CARDIAC MARKERS ( 30 Jul 2017 03:18 )  0.425 ng/mL / x     / 98 U/L / x     / 2.9 ng/mL  CARDIAC MARKERS ( 29 Jul 2017 19:08 )  0.195 ng/mL / x     / x     / x     / x          Serum Pro-Brain Natriuretic Peptide: [id number]:    Lipid Panel: Cholesterol, Serum 124  Direct LDL 60  HDL Cholesterol, Serum 47  Triglycerides, Serum 84    I&O's Summary      RADIOLOGY & ADDITIONAL STUDIES:

## 2017-07-30 NOTE — H&P ADULT - PROBLEM SELECTOR PLAN 2
EKG with Afib with RVR and widened QRS complex.   no history of Afib on previous admission and unknown to patient.   On coreg and started on Heparin drip. JULIO CESAR VASC score 7.

## 2017-07-30 NOTE — CONSULT NOTE ADULT - PROBLEM SELECTOR RECOMMENDATION 7
Diet controlled.   Accu check and HSS.  check HBA1c  endo eval Heparin drip for New onset Afib.   Improved score 2.

## 2017-07-30 NOTE — CONSULT NOTE ADULT - ASSESSMENT
88 y/o Pashto speaking F AAO x 3 from home lives with daughter ambulate with cane at baseline PMH of severe systolic CHF (s/p BiV AICD 2010 EF 25% with G3 DD), DM (diet controlled) PVD and HTN. Pt presented to ED for progressive bilateral leg swelling, increased dyspnea on exertion, orthopnea over past 3 weeks as per daughter.       Acute on chronic systolic congestive heart failure.  Plan: Previous echo with global systolic dysfunction and CXR with bilateral congestion. BNP 80890 with BL edema.Continue IV lasix will consider Dobutrex gtt.

## 2017-07-30 NOTE — H&P ADULT - NSHPPHYSICALEXAM_GEN_ALL_CORE
Physical Exam      General: WN/WD NAD  Neurology: A&Ox3  Respiratory: no wheezes, Diffuse crackles bilateral diffuse lungs.   CV: RRR, S1S2, no murmur  Abdominal: Soft, NT, ND no palpable mass, bowel sounds positive  MSK: Bilateral peripheral edema 3+,  + peripheral pulses

## 2017-07-30 NOTE — CONSULT NOTE ADULT - PROBLEM SELECTOR RECOMMENDATION 4
Likely from Afib with RVR and demand from CHF.   Monitor on tele and trend CE as above. at baseline with Creatinine 1.5 and creatinine clearance of 23.   Monitor BMP and f/u urine lytes.  Renal eval

## 2017-07-30 NOTE — H&P ADULT - ATTENDING COMMENTS
89 y/o Pashto speaking F AAO x 3 from home lives with daughter ambulate with cane at baseline PMH of severe systolic CHF (s/p BiV AICD 2010 EF 25% with G3 DD), DM (diet controlled) PVD and HTN. Pt presented to ED for progressive bilateral leg swelling, increased dyspnea on exertion, orthopnea over past 3 weeks as per daughter. Patient stated that she always has this SOB at baseline and can not ambulate much due to SOB. She has to sleep with 2-3 pillows at night and can not lie flat at all. Patient has multiple admission before with similar complaint and DDx with CHF exacerbation, previous admission in June 2017.  Pt denies fevers, chills, productive cough, abd pain, chest pain, nausea, vomiting, diarrhea, urinary symptoms or any other complaints.    pt seen in bed, a+o x3, nad, vitals stable, physical exam reveals no focal motor deficit, clear lungs, regular s1s2, abd soft nd nt bs+, ext b/l le edema. labs and diagnostic test result reviewed.    assessment   --- acute on chronic systolic heart failure,  afib, r/o acs, h/o severe systolic CHF (s/p BiV AICD 2010 EF 25% with G3 DD), DM (diet controlled) PVD and HTN    plan  --  adm to tele, acs protocol, iv lasix, heparin drip, aspirin, statin, cont preadmit home meds, gi profilaxis  cbc, bmp, mg, phos, lipid, tsh, ce q8 x3      cardio cons  pulm cons 89 y/o Upper sorbian speaking F AAO x 3 from home lives with daughter ambulate with cane at baseline PMH of severe systolic CHF (s/p BiV AICD 2010 EF 25% with G3 DD), DM (diet controlled) PVD and HTN. Pt presented to ED for progressive bilateral leg swelling, increased dyspnea on exertion, orthopnea over past 3 weeks as per daughter. Patient stated that she always has this SOB at baseline and can not ambulate much due to SOB. She has to sleep with 2-3 pillows at night and can not lie flat at all. Patient has multiple admission before with similar complaint and DDx with CHF exacerbation, previous admission in June 2017.  Pt denies fevers, chills, productive cough, abd pain, chest pain, nausea, vomiting, diarrhea, urinary symptoms or any other complaints.    pt seen in bed, a+o x3, nad, vitals stable, physical exam reveals no focal motor deficit, lungs b/l crackles, regular s1s2, abd soft nd nt bs+, ext b/l le edema. labs and diagnostic test result reviewed.    assessment   --- acute on chronic systolic heart failure,  afib, r/o acs, h/o severe systolic CHF (s/p BiV AICD 2010 EF 25% with G3 DD), DM (diet controlled) PVD and HTN    plan  --  adm to tele, acs protocol, iv lasix, heparin drip, aspirin, statin, cont preadmit home meds, gi profilaxis  cbc, bmp, mg, phos, lipid, tsh, ce q8 x3      cardio cons  pulm cons

## 2017-07-30 NOTE — CONSULT NOTE ADULT - PROBLEM SELECTOR PROBLEM 7
Type 2 diabetes mellitus without complication, without long-term current use of insulin Need for prophylactic measure

## 2017-07-30 NOTE — CONSULT NOTE ADULT - PROBLEM SELECTOR PROBLEM 6
History of Hypertension Type 2 diabetes mellitus without complication, without long-term current use of insulin

## 2017-07-30 NOTE — CONSULT NOTE ADULT - PROBLEM SELECTOR RECOMMENDATION 5
at baseline with Creatinine 1.5 and creatinine clearance of 23.   Monitor BMP and f/u urine lytes. Coreg home dose with parameter for Afib. And IV Lasix for HF.  Patient is hypotensive for now likely from systolic and diastolic HF.  Medication given with parameter.

## 2017-07-30 NOTE — PATIENT PROFILE ADULT. - LANGUAGE ASSISTANCE NEEDED
family/No-Patient/Caregiver offered and refused free interpretation services. Renita Christian/No-Patient/Caregiver offered and refused free interpretation services.

## 2017-07-30 NOTE — H&P ADULT - ASSESSMENT
89 y/o Wallisian speaking F AAO x 3 from home lives with daughter ambulate with cane at baseline PMH of severe systolic CHF (s/p BiV AICD 2010 EF 25% with G3 DD), DM (diet controlled) PVD and HTN. Pt presented to ED for progressive bilateral leg swelling, increased dyspnea on exertion, orthopnea over past 3 weeks as per daughter.

## 2017-07-30 NOTE — H&P ADULT - PROBLEM SELECTOR PROBLEM 5
Type 2 diabetes mellitus without complication, without long-term current use of insulin History of Hypertension

## 2017-07-30 NOTE — H&P ADULT - PROBLEM SELECTOR PLAN 5
Diet controlled.   Accu check and HSS. Coreg home dose with parameter for Afib. And IV Lasix for HF.  Patient is hypotensive for now likely from systolic and diastolic HF.  Medication given with parameter.

## 2017-07-30 NOTE — CONSULT NOTE ADULT - PROBLEM SELECTOR RECOMMENDATION 9
Previous echo with global systolic dysfunction and CXR with bilateral congestion. BNP 75990 with BL edema.  s/p Lasix 40mg IV push x 2 doses.   Repeat XR in AM. Patient was on Lasix 20mg BID at home.   Continue with IV Lasix 40mg daily for now. Intake/output monitor.   Fluid restriction to 1L. Coreg home dose.   Cardio Dr Shaw. F/u Doppler to r/o DVT for BL leg swelling. Previous echo with global systolic dysfunction and CXR with bilateral congestion. BNP 09783 with BL edema.  s/p Lasix 40mg IV push x 2 doses.   Repeat XR in AM. Patient was on Lasix 20mg BID at home.   Continue with IV Lasix 40mg daily for now. Intake/output monitor.   Fluid restriction to 1L. Coreg home dose.   Cardio Dr Shaw. F/u Doppler to r/o DVT for BL leg swelling.  R/o ACS protocol

## 2017-07-30 NOTE — CONSULT NOTE ADULT - PROBLEM SELECTOR RECOMMENDATION 6
Coreg home dose with parameter for Afib. And IV Lasix for HF.  Patient is hypotensive for now likely from systolic and diastolic HF.  Medication given with parameter. Diet controlled.   Accu check and HSS.  check HBA1c  endo eval

## 2017-07-30 NOTE — H&P ADULT - PROBLEM SELECTOR PLAN 1
Previous echo with global systolic dysfunction and CXR with bilateral congestion. BNP 32508 with BL edema.  s/p Lasix 40mg IV push x 2 doses.   Repeat XR in AM. Patient was on Lasix 20mg BID at home.   Continue with IV Lasix 40mg daily for now. Intake/output monitor.   Fluid restriction to 1L. Coreg home dose.   Cardio Dr Shaw. F/u Doppler to r/o DVT for BL leg swelling.

## 2017-07-30 NOTE — H&P ADULT - NSHPLABSRESULTS_GEN_ALL_CORE
Labs:                        16.2   5.9   )-----------( [id number]      ( 29 Jul 2017 19:08 )             49.0     07-29    132<L>  |  97  |  42<H>  ----------------------------<  123<H>  3.3<L>   |  18<L>  |  1.52<H>    Ca    8.9      29 Jul 2017 19:08  Mg     2.1     07-29    TPro  7.6  /  Alb  3.0<L>  /  TBili  2.2<H>  /  DBili  x   /  AST  42<H>  /  ALT  19  /  AlkPhos  145<H>  07-29

## 2017-07-30 NOTE — H&P ADULT - PROBLEM SELECTOR PROBLEM 6
Need for prophylactic measure Type 2 diabetes mellitus without complication, without long-term current use of insulin

## 2017-07-30 NOTE — H&P ADULT - NSHPREVIEWOFSYSTEMS_GEN_ALL_CORE
REVIEW OF SYSTEMS:    CONSTITUTIONAL: No weakness, fevers or chills  RESPIRATORY: No cough, wheezing, hemoptysis; shortness of breath on exertion and orthopnoea.   CARDIOVASCULAR: No chest pain or palpitations  GASTROINTESTINAL: No abdominal or epigastric pain. No nausea, vomiting, or hematemesis; No diarrhea or constipation. No melena or hematochezia.  NEUROLOGICAL: No numbness or weakness  All other review of systems is negative unless indicated above.

## 2017-07-30 NOTE — CONSULT NOTE ADULT - PROBLEM SELECTOR RECOMMENDATION 2
oxygen supp  bronchodilators  lasix supp oxygen supp  bronchodilators PRN.  lasix supp  R/o Copd (pt is a former smoker)  Pfts as op

## 2017-07-30 NOTE — H&P ADULT - PROBLEM SELECTOR PLAN 4
Coreg home dose with parameter for Afib. And IV Lasix for HF.  Patient is hypotensive for now likely from systolic and diastolic HF.  Medication given with parameter. at baseline with Creatinine 1.5 and creatinine clearance of 23.   Monitor BMP and f/u urine lytes.

## 2017-07-31 ENCOUNTER — TRANSCRIPTION ENCOUNTER (OUTPATIENT)
Age: 82
End: 2017-07-31

## 2017-07-31 LAB
ALBUMIN SERPL ELPH-MCNC: 2.8 G/DL — LOW (ref 3.5–5)
ALP SERPL-CCNC: 125 U/L — HIGH (ref 40–120)
ALT FLD-CCNC: 60 U/L DA — SIGNIFICANT CHANGE UP (ref 10–60)
ANION GAP SERPL CALC-SCNC: 19 MMOL/L — HIGH (ref 5–17)
ANION GAP SERPL CALC-SCNC: 19 MMOL/L — HIGH (ref 5–17)
APTT BLD: 40.2 SEC — HIGH (ref 27.5–37.4)
AST SERPL-CCNC: 122 U/L — HIGH (ref 10–40)
BASOPHILS # BLD AUTO: 0 K/UL — SIGNIFICANT CHANGE UP (ref 0–0.2)
BASOPHILS # BLD AUTO: 0.1 K/UL — SIGNIFICANT CHANGE UP (ref 0–0.2)
BASOPHILS NFR BLD AUTO: 0.4 % — SIGNIFICANT CHANGE UP (ref 0–2)
BASOPHILS NFR BLD AUTO: 0.5 % — SIGNIFICANT CHANGE UP (ref 0–2)
BILIRUB SERPL-MCNC: 3.4 MG/DL — HIGH (ref 0.2–1.2)
BUN SERPL-MCNC: 60 MG/DL — HIGH (ref 7–18)
BUN SERPL-MCNC: 63 MG/DL — HIGH (ref 7–18)
CALCIUM SERPL-MCNC: 9.1 MG/DL — SIGNIFICANT CHANGE UP (ref 8.4–10.5)
CALCIUM SERPL-MCNC: 9.1 MG/DL — SIGNIFICANT CHANGE UP (ref 8.4–10.5)
CHLORIDE SERPL-SCNC: 94 MMOL/L — LOW (ref 96–108)
CHLORIDE SERPL-SCNC: 96 MMOL/L — SIGNIFICANT CHANGE UP (ref 96–108)
CO2 SERPL-SCNC: 19 MMOL/L — LOW (ref 22–31)
CO2 SERPL-SCNC: 20 MMOL/L — LOW (ref 22–31)
CREAT SERPL-MCNC: 2.78 MG/DL — HIGH (ref 0.5–1.3)
CREAT SERPL-MCNC: 2.83 MG/DL — HIGH (ref 0.5–1.3)
EOSINOPHIL # BLD AUTO: 0 K/UL — SIGNIFICANT CHANGE UP (ref 0–0.5)
EOSINOPHIL # BLD AUTO: 0 K/UL — SIGNIFICANT CHANGE UP (ref 0–0.5)
EOSINOPHIL NFR BLD AUTO: 0 % — SIGNIFICANT CHANGE UP (ref 0–6)
EOSINOPHIL NFR BLD AUTO: 0 % — SIGNIFICANT CHANGE UP (ref 0–6)
GLUCOSE SERPL-MCNC: 116 MG/DL — HIGH (ref 70–99)
GLUCOSE SERPL-MCNC: 123 MG/DL — HIGH (ref 70–99)
HCT VFR BLD CALC: 51.9 % — HIGH (ref 34.5–45)
HCT VFR BLD CALC: 52.4 % — HIGH (ref 34.5–45)
HGB BLD-MCNC: 16.4 G/DL — HIGH (ref 11.5–15.5)
HGB BLD-MCNC: 16.5 G/DL — HIGH (ref 11.5–15.5)
INR BLD: 2.58 RATIO — HIGH (ref 0.88–1.16)
LYMPHOCYTES # BLD AUTO: 0.7 K/UL — LOW (ref 1–3.3)
LYMPHOCYTES # BLD AUTO: 0.8 K/UL — LOW (ref 1–3.3)
LYMPHOCYTES # BLD AUTO: 6.3 % — LOW (ref 13–44)
LYMPHOCYTES # BLD AUTO: 6.8 % — LOW (ref 13–44)
MAGNESIUM SERPL-MCNC: 2.2 MG/DL — SIGNIFICANT CHANGE UP (ref 1.6–2.6)
MCHC RBC-ENTMCNC: 31.4 GM/DL — LOW (ref 32–36)
MCHC RBC-ENTMCNC: 31.6 GM/DL — LOW (ref 32–36)
MCHC RBC-ENTMCNC: 33 PG — SIGNIFICANT CHANGE UP (ref 27–34)
MCHC RBC-ENTMCNC: 33.2 PG — SIGNIFICANT CHANGE UP (ref 27–34)
MCV RBC AUTO: 105 FL — HIGH (ref 80–100)
MCV RBC AUTO: 105.1 FL — HIGH (ref 80–100)
MONOCYTES # BLD AUTO: 0.8 K/UL — SIGNIFICANT CHANGE UP (ref 0–0.9)
MONOCYTES # BLD AUTO: 0.8 K/UL — SIGNIFICANT CHANGE UP (ref 0–0.9)
MONOCYTES NFR BLD AUTO: 6.8 % — SIGNIFICANT CHANGE UP (ref 2–14)
MONOCYTES NFR BLD AUTO: 6.8 % — SIGNIFICANT CHANGE UP (ref 2–14)
NEUTROPHILS # BLD AUTO: 9.7 K/UL — HIGH (ref 1.8–7.4)
NEUTROPHILS # BLD AUTO: 9.9 K/UL — HIGH (ref 1.8–7.4)
NEUTROPHILS NFR BLD AUTO: 86 % — HIGH (ref 43–77)
NEUTROPHILS NFR BLD AUTO: 86.4 % — HIGH (ref 43–77)
PHOSPHATE SERPL-MCNC: 5.7 MG/DL — HIGH (ref 2.5–4.5)
PLATELET # BLD AUTO: 148 K/UL — LOW (ref 150–400)
PLATELET # BLD AUTO: 157 K/UL — SIGNIFICANT CHANGE UP (ref 150–400)
POTASSIUM SERPL-MCNC: 4.3 MMOL/L — SIGNIFICANT CHANGE UP (ref 3.5–5.3)
POTASSIUM SERPL-MCNC: 5 MMOL/L — SIGNIFICANT CHANGE UP (ref 3.5–5.3)
POTASSIUM SERPL-SCNC: 4.3 MMOL/L — SIGNIFICANT CHANGE UP (ref 3.5–5.3)
POTASSIUM SERPL-SCNC: 5 MMOL/L — SIGNIFICANT CHANGE UP (ref 3.5–5.3)
PROT SERPL-MCNC: 7.3 G/DL — SIGNIFICANT CHANGE UP (ref 6–8.3)
PROTHROM AB SERPL-ACNC: 28.7 SEC — HIGH (ref 9.8–12.7)
RBC # BLD: 4.94 M/UL — SIGNIFICANT CHANGE UP (ref 3.8–5.2)
RBC # BLD: 4.99 M/UL — SIGNIFICANT CHANGE UP (ref 3.8–5.2)
RBC # FLD: 14.9 % — HIGH (ref 10.3–14.5)
RBC # FLD: 15.3 % — HIGH (ref 10.3–14.5)
SODIUM SERPL-SCNC: 133 MMOL/L — LOW (ref 135–145)
SODIUM SERPL-SCNC: 134 MMOL/L — LOW (ref 135–145)
WBC # BLD: 11.3 K/UL — HIGH (ref 3.8–10.5)
WBC # BLD: 11.5 K/UL — HIGH (ref 3.8–10.5)
WBC # FLD AUTO: 11.3 K/UL — HIGH (ref 3.8–10.5)
WBC # FLD AUTO: 11.5 K/UL — HIGH (ref 3.8–10.5)

## 2017-07-31 PROCEDURE — 71010: CPT | Mod: 26

## 2017-07-31 PROCEDURE — 93970 EXTREMITY STUDY: CPT | Mod: 26

## 2017-07-31 RX ORDER — DIGOXIN 250 MCG
0.5 TABLET ORAL ONCE
Qty: 0 | Refills: 0 | Status: COMPLETED | OUTPATIENT
Start: 2017-07-31 | End: 2017-07-31

## 2017-07-31 RX ORDER — HEPARIN SODIUM 5000 [USP'U]/ML
4500 INJECTION INTRAVENOUS; SUBCUTANEOUS EVERY 6 HOURS
Qty: 0 | Refills: 0 | Status: DISCONTINUED | OUTPATIENT
Start: 2017-07-31 | End: 2017-07-31

## 2017-07-31 RX ORDER — HEPARIN SODIUM 5000 [USP'U]/ML
4500 INJECTION INTRAVENOUS; SUBCUTANEOUS ONCE
Qty: 0 | Refills: 0 | Status: DISCONTINUED | OUTPATIENT
Start: 2017-07-31 | End: 2017-07-31

## 2017-07-31 RX ORDER — ONDANSETRON 8 MG/1
4 TABLET, FILM COATED ORAL ONCE
Qty: 0 | Refills: 0 | Status: COMPLETED | OUTPATIENT
Start: 2017-07-31 | End: 2017-07-31

## 2017-07-31 RX ORDER — FUROSEMIDE 40 MG
40 TABLET ORAL DAILY
Qty: 0 | Refills: 0 | Status: DISCONTINUED | OUTPATIENT
Start: 2017-07-31 | End: 2017-08-02

## 2017-07-31 RX ORDER — HEPARIN SODIUM 5000 [USP'U]/ML
INJECTION INTRAVENOUS; SUBCUTANEOUS
Qty: 25000 | Refills: 0 | Status: DISCONTINUED | OUTPATIENT
Start: 2017-07-31 | End: 2017-08-01

## 2017-07-31 RX ORDER — HEPARIN SODIUM 5000 [USP'U]/ML
2000 INJECTION INTRAVENOUS; SUBCUTANEOUS EVERY 6 HOURS
Qty: 0 | Refills: 0 | Status: DISCONTINUED | OUTPATIENT
Start: 2017-07-31 | End: 2017-07-31

## 2017-07-31 RX ORDER — DIGOXIN 250 MCG
0.25 TABLET ORAL DAILY
Qty: 0 | Refills: 0 | Status: DISCONTINUED | OUTPATIENT
Start: 2017-08-01 | End: 2017-08-03

## 2017-07-31 RX ORDER — DIGOXIN 250 MCG
0.25 TABLET ORAL EVERY 8 HOURS
Qty: 0 | Refills: 0 | Status: COMPLETED | OUTPATIENT
Start: 2017-07-31 | End: 2017-07-31

## 2017-07-31 RX ADMIN — HEPARIN SODIUM 1100 UNIT(S)/HR: 5000 INJECTION INTRAVENOUS; SUBCUTANEOUS at 19:57

## 2017-07-31 RX ADMIN — Medication 25 MILLIGRAM(S): at 06:01

## 2017-07-31 RX ADMIN — Medication 0.25 MILLIGRAM(S): at 21:53

## 2017-07-31 RX ADMIN — Medication 81 MILLIGRAM(S): at 13:15

## 2017-07-31 RX ADMIN — Medication 0.5 MILLIGRAM(S): at 13:16

## 2017-07-31 RX ADMIN — Medication 0.5 MILLIGRAM(S): at 01:24

## 2017-07-31 NOTE — PROGRESS NOTE ADULT - PROBLEM SELECTOR PLAN 1
EF 20 %   c/w lasix, Digoxin added today   w/ monitor Dig level in am   holding coreg now as BP < 90 EF 20 % chest xary today congestion improved from last night   c/w lasix, Digoxin added today as per Dr Gomes    w/ monitor Dig level in am   holding coreg now as BP < 90

## 2017-07-31 NOTE — PROGRESS NOTE ADULT - SUBJECTIVE AND OBJECTIVE BOX
88 y/o Persian speaking F AAO x 3 from home lives with daughter ambulate with cane at baseline, PMH of severe systolic CHF (s/p BiV AICD 2010 EF 25% with G3 DD), DM (diet controlled) PVD and HTN. Pt presented to ED for progressive bilateral leg swelling, increased dyspnea on exertion, orthopnea over past 3 weeks as per daughter. Patient stated that she always has this SOB at baseline and can not ambulate much due to SOB. She has to sleep with 2-3 pillows at night and can not lie flat at all. Patient has multiple admission before with similar complaint and DDx with CHF exacerbation, previous admission in June 2017.  Pt denies fevers, chills, abd pain, chest pain, nausea, vomiting, diarrhea, urinary symptoms or any other complaints. Has cough productive of clear phlegm and leg edema.    INTERVAL HPI/OVERNIGHT EVENTS:  Alert, awake and oriented x3. Resting in bed comfortably in NAD.     VITAL SIGNS:  T(F): 97.7 (07-31-17 @ 08:11)  HR: 64 (07-31-17 @ 08:11)  BP: 94/74 (07-31-17 @ 08:11)  RR: 18 (07-31-17 @ 08:11)  SpO2: 94% (07-31-17 @ 08:11)  Wt(kg): --  I&O's Detail      REVIEW OF SYSTEMS:    CONSTITUTIONAL:  No fevers, chills, sweats    HEENT:  Eyes:  No diplopia or blurred vision. ENT:  No earache, sore throat or runny nose.    CARDIOVASCULAR:  No pressure, squeezing, tightness, or heaviness about the chest; no palpitations.    RESPIRATORY:  Per HPI    GASTROINTESTINAL:  No abdominal pain, nausea, vomiting or diarrhea.    GENITOURINARY:  No dysuria, frequency or urgency.    NEUROLOGIC:  No paresthesias, fasciculations, seizures or weakness.    PSYCHIATRIC:  No disorder of thought or mood.      PHYSICAL EXAM:    Constitutional: Well developed and nourished  Eyes: Perrla  ENMT: normal  Neck: supple  Respiratory: rales bilaterally   Cardiovascular: S1 S2 regular  Gastrointestinal: Soft, Non tender  Extremities: No edema  Vascular: normal  Neurological: Awake, alert,Ox3  Musculoskeletal: Normal      MEDICATIONS  (STANDING):  aspirin  chewable 81 milliGRAM(s) Oral daily  insulin lispro (HumaLOG) corrective regimen sliding scale   SubCutaneous three times a day before meals  furosemide   Injectable 40 milliGRAM(s) IV Push daily    MEDICATIONS  (PRN):  heparin  Injectable 4500 Unit(s) IV Push every 6 hours PRN For aPTT less than 40  heparin  Injectable 2000 Unit(s) IV Push every 6 hours PRN For aPTT between 40 - 57      Allergies  No Known Allergies    Intolerances        LABS:                        17.2   10.5  )-----------( 150      ( 30 Jul 2017 16:24 )             52.6     07-30    133<L>  |  96  |  48<H>  ----------------------------<  77  4.2   |  16<L>  |  2.28<H>    Ca    9.4      30 Jul 2017 16:24  Phos  3.8     07-30  Mg     2.2     07-30    TPro  7.6  /  Alb  3.0<L>  /  TBili  2.2<H>  /  DBili  x   /  AST  42<H>  /  ALT  19  /  AlkPhos  145<H>  07-29    PT/INR - ( 30 Jul 2017 16:24 )   PT: 24.9 sec;   INR: 2.25 ratio         PTT - ( 30 Jul 2017 10:20 )  PTT:>200.0 sec      CARDIAC MARKERS ( 30 Jul 2017 16:24 )  0.668 ng/mL / x     / 70 U/L / x     / 3.7 ng/mL  CARDIAC MARKERS ( 30 Jul 2017 05:28 )  0.486 ng/mL / x     / 59 U/L / x     / x      CARDIAC MARKERS ( 30 Jul 2017 03:18 )  0.425 ng/mL / x     / 98 U/L / x     / 2.9 ng/mL  CARDIAC MARKERS ( 29 Jul 2017 19:08 )  0.195 ng/mL / x     / x     / x     / x          CAPILLARY BLOOD GLUCOSE  100 (31 Jul 2017 08:11)  101 (30 Jul 2017 21:03)  81 (30 Jul 2017 16:34)  85 (30 Jul 2017 11:23)      pro-bnp [id number] 07-29 @ 19:08     d-dimer --  07-29 @ 19:08      RADIOLOGY & ADDITIONAL TESTS:    CXR:  < from: Xray Chest 1 View AP -PORTABLE-Routine (07.31.17 @ 09:13) >  EXAM:  CHEST PORTABLE ROUTINE                        PROCEDURE DATE:  07/31/2017    INTERPRETATION:  PORTABLE CHEST X-RAY  HISTORY: pul congestion.   COMPARISON: 7/29/2017.    FINDINGS/  IMPRESSION:  Mild diffuse interstitial prominence has improved since the prior study.   There is bibasilar subsegmental atelectasis and a trace pleural effusion.   No pneumothorax.    The cardiac silhouette is not accurately assessed by AP technique.   Left-sided triple lead ICD is reidentified; it perihepatic obscures the   left lower hemithorax.    JOHNIE LOVE M.D., ATTENDING RADIOLOGIST  This document has been electronically signed. Jul 31 2017  9:46AM  < end of copied text >    CT CHEST SCAN:   No new CT Scan images to report.     EKG:   < from: 12 Lead ECG (07.30.17 @ 09:19) >  Ventricular Rate 122 BPM  Atrial Rate 120 BPM  QRS Duration 164 ms   ms  QTc 573 ms  R Axis -47 degrees  T Axis 134 degrees    Diagnosis Line Atrial fibrillation with rapid ventricular response  Left axis deviation  Left bundle branch block  Abnormal ECG  Confirmed by CLOVER KAY, CASSI ([id number]) on 31-Jul-2017 08:39:34  < end of copied text >    ECHO:   < from: Transthoracic Echocardiogram (06.19.17 @ 08:00) >  Patient name: ERICA MORALES  YOB: 1928   Age: 89 (F)   MR#: [id number]  Study Date: 6/19/2017  [facility]  Study quality: Technically good  Referring Physician:  SABINE ESQUIVEL MD /  LORNA STANTON MD  Blood Pressure: 107/76 mmHg  Height: 152 cm  Weight: 62 kg  BSA: 1.6 m2  ------------------------------------------------------------------------    PROCEDURE: Transthoracic echocardiogram with 2-D, M-Mode  and complete spectral and color flow Doppler.  INDICATION: Acute systolic (congestive) heart failure  (I50.21)  ------------------------------------------------------------------------  DIMENSIONS:  Dimensions:     Normal Values:  LA:     3.2 cm    2.0 - 4.0 cm  Ao:     3.3 cm    2.0 - 3.8 cm  SEPTUM: 1.3 cm    0.6 - 1.2 cm  PWT:    1.2 cm    0.6 - 1.1 cm  LVIDd:  3.1 cm    3.0 - 5.6 cm  LVIDs:  2.9 cm    1.8 - 4.0 cm      Derived Variables:  LVMI: 77 g/m2  RWT: 0.77    ------------------------------------------------------------------------  OBSERVATIONS:  Mitral Valve: Mitral annular calcification, and calcified  mitral leaflets with normal diastolic opening. Severe  mitral regurgitation.  Aortic Root: Normal aortic root.  Aortic Valve: Calcified trileaflet aortic valve with normal  opening. Minimal aortic regurgitation.  Left Atrium: Normal left atrium.  Left Ventricle: Severe global left ventricular dysfunction.  Moderate concentric left ventricular hypertrophy. Grade III  diastolic dysfunction  Right Heart: Normal right atrium. Normal right ventricular  size and function. There is moderate-severe tricuspid  regurgitation. Normal pulmonic valve.  Pericardium/PleuraNormal pericardium with no pericardial  effusion.  Hemodynamic: RV systolic pressure is mildly  increased.=31mmhg  ------------------------------------------------------------------------  CONCLUSIONS:  1. Severe mitral regurgitation.  2. Moderate concentric left ventricular hypertrophy.  3. Severe global left ventricular dysfunction.  4. Grade III diastolic dysfunction  5. RV systolic pressure is mildly increased.=31mmhg  6. There is moderate-severe tricuspid regurgitation.    ------------------------------------------------------------------------  Confirmed on  6/19/2017 - 17:22:09 by Seb Loja MD  ------------------------------------------------------------------------  < end of copied text > 88 y/o Macedonian speaking F AAO x 3 from home lives with daughter ambulate with cane at baseline, PMH of severe systolic CHF (s/p BiV AICD 2010 EF 25% with G3 DD), DM (diet controlled) PVD and HTN. Pt presented to ED for progressive bilateral leg swelling, increased dyspnea on exertion, orthopnea over past 3 weeks as per daughter. Patient stated that she always has this SOB at baseline and can not ambulate much due to SOB. She has to sleep with 2-3 pillows at night and can not lie flat at all. Patient has multiple admission before with similar complaint and DDx with CHF exacerbation, previous admission in June 2017.  Pt denies fevers, chills, abd pain, chest pain, nausea, vomiting, diarrhea, urinary symptoms or any other complaints. Has cough productive of clear phlegm and leg edema.    INTERVAL HPI/OVERNIGHT EVENTS:  Alert, awake and oriented x3. Resting OOB to chair in NAD.     VITAL SIGNS:  T(F): 97.7 (07-31-17 @ 08:11)  HR: 64 (07-31-17 @ 08:11)  BP: 94/74 (07-31-17 @ 08:11)  RR: 18 (07-31-17 @ 08:11)  SpO2: 94% (07-31-17 @ 08:11)  Wt(kg): --  I&O's Detail      REVIEW OF SYSTEMS:    CONSTITUTIONAL:  No fevers, chills, sweats    HEENT:  Eyes:  No diplopia or blurred vision. ENT:  No earache, sore throat or runny nose.    CARDIOVASCULAR:  No pressure, squeezing, tightness, or heaviness about the chest; no palpitations.    RESPIRATORY:  Per HPI    GASTROINTESTINAL:  No abdominal pain, nausea, vomiting or diarrhea.    GENITOURINARY:  No dysuria, frequency or urgency.    NEUROLOGIC:  No paresthesias, fasciculations, seizures or weakness.    PSYCHIATRIC:  No disorder of thought or mood.      PHYSICAL EXAM:    Constitutional: Well developed and nourished  Eyes: Perrla  ENMT: normal  Neck: supple  Respiratory: rales bilaterally   Cardiovascular: S1 S2 regular  Gastrointestinal: Soft, Non tender  Extremities: No edema  Vascular: normal  Neurological: Awake, alert,Ox3  Musculoskeletal: Normal      MEDICATIONS  (STANDING):  aspirin  chewable 81 milliGRAM(s) Oral daily  insulin lispro (HumaLOG) corrective regimen sliding scale   SubCutaneous three times a day before meals  furosemide   Injectable 40 milliGRAM(s) IV Push daily    MEDICATIONS  (PRN):  heparin  Injectable 4500 Unit(s) IV Push every 6 hours PRN For aPTT less than 40  heparin  Injectable 2000 Unit(s) IV Push every 6 hours PRN For aPTT between 40 - 57      Allergies  No Known Allergies    Intolerances        LABS:                        17.2   10.5  )-----------( 150      ( 30 Jul 2017 16:24 )             52.6     07-30    133<L>  |  96  |  48<H>  ----------------------------<  77  4.2   |  16<L>  |  2.28<H>    Ca    9.4      30 Jul 2017 16:24  Phos  3.8     07-30  Mg     2.2     07-30    TPro  7.6  /  Alb  3.0<L>  /  TBili  2.2<H>  /  DBili  x   /  AST  42<H>  /  ALT  19  /  AlkPhos  145<H>  07-29    PT/INR - ( 30 Jul 2017 16:24 )   PT: 24.9 sec;   INR: 2.25 ratio         PTT - ( 30 Jul 2017 10:20 )  PTT:>200.0 sec      CARDIAC MARKERS ( 30 Jul 2017 16:24 )  0.668 ng/mL / x     / 70 U/L / x     / 3.7 ng/mL  CARDIAC MARKERS ( 30 Jul 2017 05:28 )  0.486 ng/mL / x     / 59 U/L / x     / x      CARDIAC MARKERS ( 30 Jul 2017 03:18 )  0.425 ng/mL / x     / 98 U/L / x     / 2.9 ng/mL  CARDIAC MARKERS ( 29 Jul 2017 19:08 )  0.195 ng/mL / x     / x     / x     / x          CAPILLARY BLOOD GLUCOSE  100 (31 Jul 2017 08:11)  101 (30 Jul 2017 21:03)  81 (30 Jul 2017 16:34)  85 (30 Jul 2017 11:23)      pro-bnp [id number] 07-29 @ 19:08     d-dimer --  07-29 @ 19:08      RADIOLOGY & ADDITIONAL TESTS:    CXR:  < from: Xray Chest 1 View AP -PORTABLE-Routine (07.31.17 @ 09:13) >  EXAM:  CHEST PORTABLE ROUTINE                        PROCEDURE DATE:  07/31/2017    INTERPRETATION:  PORTABLE CHEST X-RAY  HISTORY: pul congestion.   COMPARISON: 7/29/2017.    FINDINGS/  IMPRESSION:  Mild diffuse interstitial prominence has improved since the prior study.   There is bibasilar subsegmental atelectasis and a trace pleural effusion.   No pneumothorax.    The cardiac silhouette is not accurately assessed by AP technique.   Left-sided triple lead ICD is reidentified; it perihepatic obscures the   left lower hemithorax.    JOHNIE LOVE M.D., ATTENDING RADIOLOGIST  This document has been electronically signed. Jul 31 2017  9:46AM  < end of copied text >    CT CHEST SCAN:   No new CT Scan images to report.     EKG:   < from: 12 Lead ECG (07.30.17 @ 09:19) >  Ventricular Rate 122 BPM  Atrial Rate 120 BPM  QRS Duration 164 ms   ms  QTc 573 ms  R Axis -47 degrees  T Axis 134 degrees    Diagnosis Line Atrial fibrillation with rapid ventricular response  Left axis deviation  Left bundle branch block  Abnormal ECG  Confirmed by CLOVER KAY, CASSI ([id number]) on 31-Jul-2017 08:39:34  < end of copied text >    ECHO:   < from: Transthoracic Echocardiogram (06.19.17 @ 08:00) >  Patient name: ERICA MORALES  YOB: 1928   Age: 89 (F)   MR#: [id number]  Study Date: 6/19/2017  [facility]  Study quality: Technically good  Referring Physician:  SABINE ESQUIVEL MD /  LORNA STANTON MD  Blood Pressure: 107/76 mmHg  Height: 152 cm  Weight: 62 kg  BSA: 1.6 m2  ------------------------------------------------------------------------    PROCEDURE: Transthoracic echocardiogram with 2-D, M-Mode  and complete spectral and color flow Doppler.  INDICATION: Acute systolic (congestive) heart failure  (I50.21)  ------------------------------------------------------------------------  DIMENSIONS:  Dimensions:     Normal Values:  LA:     3.2 cm    2.0 - 4.0 cm  Ao:     3.3 cm    2.0 - 3.8 cm  SEPTUM: 1.3 cm    0.6 - 1.2 cm  PWT:    1.2 cm    0.6 - 1.1 cm  LVIDd:  3.1 cm    3.0 - 5.6 cm  LVIDs:  2.9 cm    1.8 - 4.0 cm      Derived Variables:  LVMI: 77 g/m2  RWT: 0.77    ------------------------------------------------------------------------  OBSERVATIONS:  Mitral Valve: Mitral annular calcification, and calcified  mitral leaflets with normal diastolic opening. Severe  mitral regurgitation.  Aortic Root: Normal aortic root.  Aortic Valve: Calcified trileaflet aortic valve with normal  opening. Minimal aortic regurgitation.  Left Atrium: Normal left atrium.  Left Ventricle: Severe global left ventricular dysfunction.  Moderate concentric left ventricular hypertrophy. Grade III  diastolic dysfunction  Right Heart: Normal right atrium. Normal right ventricular  size and function. There is moderate-severe tricuspid  regurgitation. Normal pulmonic valve.  Pericardium/PleuraNormal pericardium with no pericardial  effusion.  Hemodynamic: RV systolic pressure is mildly  increased.=31mmhg  ------------------------------------------------------------------------  CONCLUSIONS:  1. Severe mitral regurgitation.  2. Moderate concentric left ventricular hypertrophy.  3. Severe global left ventricular dysfunction.  4. Grade III diastolic dysfunction  5. RV systolic pressure is mildly increased.=31mmhg  6. There is moderate-severe tricuspid regurgitation.    ------------------------------------------------------------------------  Confirmed on  6/19/2017 - 17:22:09 by Seb Loja MD  ------------------------------------------------------------------------  < end of copied text >

## 2017-07-31 NOTE — PROGRESS NOTE ADULT - SUBJECTIVE AND OBJECTIVE BOX
Patient is a 89y old  Female who presents with a chief complaint of SOB (30 Jul 2017 01:43)      INTERVAL HPI/OVERNIGHT EVENTS:  Ptient seen and examined at bed disha 3    MEDICATIONS  (STANDING):  aspirin  chewable 81 milliGRAM(s) Oral daily  insulin lispro (HumaLOG) corrective regimen sliding scale   SubCutaneous three times a day before meals  furosemide   Injectable 40 milliGRAM(s) IV Push daily  digoxin  Injectable 0.25 milliGRAM(s) IV Push every 8 hours  heparin  Infusion.  Unit(s)/Hr (11 mL/Hr) IV Continuous <Continuous>  heparin  Injectable 4500 Unit(s) IV Push once    MEDICATIONS  (PRN):  heparin  Injectable 4500 Unit(s) IV Push every 6 hours PRN For aPTT less than 40  heparin  Injectable 2000 Unit(s) IV Push every 6 hours PRN For aPTT between 40 - 57            Vital Signs Last 24 Hrs  T(C): 36.8 (31 Jul 2017 15:57), Max: 36.8 (31 Jul 2017 15:57)  T(F): 98.2 (31 Jul 2017 15:57), Max: 98.2 (31 Jul 2017 15:57)  HR: 100 (31 Jul 2017 15:57) (64 - 139)  BP: 91/60 (31 Jul 2017 15:57) (87/68 - 109/75)  BP(mean): --  RR: 18 (31 Jul 2017 15:57) (16 - 18)  SpO2: 100% (31 Jul 2017 15:57) (94% - 100%)    Constitutional: Well developed and nourished  Eyes: Perrla  ENMT: normal  Neck: supple  Respiratory: rales bilaterally   Cardiovascular: S1 S2 regular  Gastrointestinal: Soft, Non tender  Extremities: No edema  Vascular: normal  Neurological: Awake, alert,Ox3  Musculoskeletal: Normal    LABS:                        16.5   11.5  )-----------( 148      ( 31 Jul 2017 12:35 )             52.4     07-31    134<L>  |  96  |  63<H>  ----------------------------<  123<H>  4.3   |  19<L>  |  2.83<H>    Ca    9.1      31 Jul 2017 12:35  Phos  5.7     07-31  Mg     2.2     07-31    TPro  7.3  /  Alb  2.8<L>  /  TBili  3.4<H>  /  DBili  x   /  AST  122<H>  /  ALT  60  /  AlkPhos  125<H>  07-31    PT/INR - ( 31 Jul 2017 12:35 )   PT: 28.7 sec;   INR: 2.58 ratio         PTT - ( 31 Jul 2017 12:35 )  PTT:40.2 sec    CAPILLARY BLOOD GLUCOSE  113 (31 Jul 2017 16:12)  107 (31 Jul 2017 11:50)  100 (31 Jul 2017 08:11)  101 (30 Jul 2017 21:03)          RADIOLOGY & ADDITIONAL TESTS:  venous doppler Impression: No evidence for deep vein thrombosis.       Imaging Personally Reviewed:  [ ] YES  [ ] NO    Consultant(s) Notes Reviewed:  [ ] YES  [ ] NO    Care Discussed with Consultants/Other Providers [ ] YES  [ ] NO    Plan of Care discussed with Attending/PGY2 [ ]YES [ ] NO Patient is a 89y old  Female who presents with a chief complaint of SOB (30 Jul 2017 01:43)      INTERVAL HPI/OVERNIGHT EVENTS:  Ptient seen and examined at bed disha 3    MEDICATIONS  (STANDING):  aspirin  chewable 81 milliGRAM(s) Oral daily  insulin lispro (HumaLOG) corrective regimen sliding scale   SubCutaneous three times a day before meals  furosemide   Injectable 40 milliGRAM(s) IV Push daily  digoxin  Injectable 0.25 milliGRAM(s) IV Push every 8 hours  heparin  Infusion.  Unit(s)/Hr (11 mL/Hr) IV Continuous <Continuous>  heparin  Injectable 4500 Unit(s) IV Push once    MEDICATIONS  (PRN):  heparin  Injectable 4500 Unit(s) IV Push every 6 hours PRN For aPTT less than 40  heparin  Injectable 2000 Unit(s) IV Push every 6 hours PRN For aPTT between 40 - 57            Vital Signs Last 24 Hrs  T(C): 36.8 (31 Jul 2017 15:57), Max: 36.8 (31 Jul 2017 15:57)  T(F): 98.2 (31 Jul 2017 15:57), Max: 98.2 (31 Jul 2017 15:57)  HR: 100 (31 Jul 2017 15:57) (64 - 139)  BP: 91/60 (31 Jul 2017 15:57) (87/68 - 109/75)  BP(mean): --  RR: 18 (31 Jul 2017 15:57) (16 - 18)  SpO2: 100% (31 Jul 2017 15:57) (94% - 100%)    Constitutional: Well developed and nourished  Eyes: Perrla  ENMT: normal  Neck: supple  Respiratory: rales bilaterally   Cardiovascular: S1 S2 regular  Gastrointestinal: Soft, Non tender  Extremities: No edema  Vascular: normal  Neurological: Awake, alert,Ox3  Musculoskeletal: Normal    LABS:                        16.5   11.5  )-----------( 148      ( 31 Jul 2017 12:35 )             52.4     07-31    134<L>  |  96  |  63<H>  ----------------------------<  123<H>  4.3   |  19<L>  |  2.83<H>    Ca    9.1      31 Jul 2017 12:35  Phos  5.7     07-31  Mg     2.2     07-31    TPro  7.3  /  Alb  2.8<L>  /  TBili  3.4<H>  /  DBili  x   /  AST  122<H>  /  ALT  60  /  AlkPhos  125<H>  07-31    PT/INR - ( 31 Jul 2017 12:35 )   PT: 28.7 sec;   INR: 2.58 ratio         PTT - ( 31 Jul 2017 12:35 )  PTT:40.2 sec    CAPILLARY BLOOD GLUCOSE  113 (31 Jul 2017 16:12)  107 (31 Jul 2017 11:50)  100 (31 Jul 2017 08:11)  101 (30 Jul 2017 21:03)          RADIOLOGY & ADDITIONAL TESTS:  venous doppler Impression: No evidence for deep vein thrombosis.   Cheat Xray   Mild diffuse interstitial prominence has improved since the prior study.   There is bibasilar subsegmental atelectasis and a trace pleural effusion.   No pneumothorax.      Imaging Personally Reviewed:  [ ] YES  [ ] NO    Consultant(s) Notes Reviewed:  [ ] YES  [ ] NO    Care Discussed with Consultants/Other Providers [ ] YES  [ ] NO    Plan of Care discussed with Attending/PGY2 [ ]YES [ ] NO

## 2017-07-31 NOTE — PROGRESS NOTE ADULT - PROBLEM SELECTOR PLAN 2
afib not controlled   patient is not on coreg still Bp<90  continue with heparin drip and monitor pTT afib not controlled   patient is not on coreg still Bp<90  continue with heparin drip and monitor pTT  - will discuss about anti- coagulations in am

## 2017-07-31 NOTE — PROGRESS NOTE ADULT - PROBLEM SELECTOR PLAN 1
s/p Lasix 40mg IV push x 2 doses.   Repeat XR in AM. Patient was on Lasix 20mg BID at home.   Continue with IV Lasix 40mg daily for now. Intake/output monitor.   Fluid restriction to 1L. Coreg home dose.   Cardio Dr Shaw. F/u Doppler to r/o DVT for BL leg swelling.  R/o ACS protocol

## 2017-07-31 NOTE — CHART NOTE - NSCHARTNOTEFT_GEN_A_CORE
Patient had wide complex tachy. Came and assess the patient. No signs and complaints. Holding the next dose of lasix, have got 80mg so far. Gave a 0.5 digoxin loading. Patient is still hypotensive and tachycardic. Will start metoprolol as tolerated.

## 2017-07-31 NOTE — PROGRESS NOTE ADULT - SUBJECTIVE AND OBJECTIVE BOX
Patient is a 89y old  Female who presents with a chief complaint of SOB (30 Jul 2017 01:43)    pt seen in tele [x  ], reg med floor [   ], bed [x  ], chair at bedside [   ], a+o x3 [x  ], lethargic [  ],  nad [ x ]    dnr/dni    Allergies    No Known Allergies        Vitals    T(F): 98 (07-31-17 @ 04:39), Max: 98 (07-31-17 @ 04:39)  HR: 139 (07-31-17 @ 04:39) (60 - 139)  BP: 109/75 (07-31-17 @ 04:39) (87/68 - 112/54)  RR: 18 (07-31-17 @ 04:39) (16 - 18)  SpO2: 100% (07-31-17 @ 04:39) (96% - 100%)  Wt(kg): --  CAPILLARY BLOOD GLUCOSE  101 (30 Jul 2017 21:03)          Labs                          17.2   10.5  )-----------( 150      ( 30 Jul 2017 16:24 )             52.6       07-30    133<L>  |  96  |  48<H>  ----------------------------<  77  4.2   |  16<L>  |  2.28<H>    Ca    9.4      30 Jul 2017 16:24  Phos  3.8     07-30  Mg     2.2     07-30    TPro  7.6  /  Alb  3.0<L>  /  TBili  2.2<H>  /  DBili  x   /  AST  42<H>  /  ALT  19  /  AlkPhos  145<H>  07-29      CARDIAC MARKERS ( 30 Jul 2017 16:24 )  0.668 ng/mL / x     / 70 U/L / x     / 3.7 ng/mL  CARDIAC MARKERS ( 30 Jul 2017 05:28 )  0.486 ng/mL / x     / 59 U/L / x     / x      CARDIAC MARKERS ( 30 Jul 2017 03:18 )  0.425 ng/mL / x     / 98 U/L / x     / 2.9 ng/mL  CARDIAC MARKERS ( 29 Jul 2017 19:08 )  0.195 ng/mL / x     / x     / x     / x        Prothrombin Time and INR, Plasma (07.30.17 @ 16:24)    Prothrombin Time, Plasma: 24.9: Effective March 21st, the reference range for PT has changed. sec    INR: 2.25: Please note: New Critical Value: 5.0 ratio as of 1/2/14. ratio    Activated Partial Thromboplastin Time (07.30.17 @ 10:20)    Activated Partial Thromboplastin Time: >200.0: TYPE:(C=Critical, N=Notification, A=Abnormal) C  TESTS: PTT  DATE/TIME CALLED: 07/30/17 11:06  CALLED TO: PAPO NAIDU  READ BACK (2 Patient Identifiers)(Y/N): Y  READ BACK VALUES (Y/N): Y  CALLED BY: PCFWPA24/30/17 11:20  Test repeated.  The recom>200.0: mended therapeutic heparin range (full dose) is 58-99 seconds.  Recommended therapeutic Argatroban range is 1.5 to 3.0 times the baseline  APTT value, not to exceed 100 seconds. Recommended therapeutic Refludan  range is 1.5 to 2.5 times the baseline>200.0: APTT. sec        Radiology Results      Meds    MEDICATIONS  (STANDING):  aspirin  chewable 81 milliGRAM(s) Oral daily  metoprolol 25 milliGRAM(s) Oral two times a day  insulin lispro (HumaLOG) corrective regimen sliding scale   SubCutaneous three times a day before meals  furosemide   Injectable 40 milliGRAM(s) IV Push daily      MEDICATIONS  (PRN):  heparin  Injectable 4500 Unit(s) IV Push every 6 hours PRN For aPTT less than 40  heparin  Injectable 2000 Unit(s) IV Push every 6 hours PRN For aPTT between 40 - 57      Physical Exam    Neuro :  no focal deficits  Respiratory: B/L basal crackles  CV: RRR, S1S2, no murmurs,   Abdominal: Soft, NT, ND +BS,  Extremities: b/l le edema, + peripheral pulses    ASSESSMENT    Acute on chronic systolic congestive heart failure  afib  r/o acs  h/o Type 2 diabetes mellitus without complication, without long-term current use of insulin  Acute on chronic systolic congestive heart failure  Peripheral Vascular Disease  Mitral Regurgitation  Cardiomyopathy  History of Hypertension  PVD (Peripheral Vascular Disease)  HTN (Hypertension)  Artificial pacemaker        PLAN      lasix held for low bp  d/c metoprolol  restart coreg at home dose  pt started on dig,   heparin drip held for ptt > 200  rept pt, ptt stat,   cardio f/u  pulm f/u89 y/o Chinese speaking F AAO x 3 from home lives with daughter ambulate with cane at baseline PMH of severe systolic CHF (s/p BiV AICD 2010 EF 25% with G3 DD), DM (diet controlled) PVD and HTN. Pt presented to ED for progressive bilateral leg swelling, increased dyspnea on exertion, orthopnea over past 3 weeks as per daughter. Patient stated that she always has this SOB at baseline and can not ambulate much due to SOB. She has to sleep with 2-3 pillows at night and can not lie flat at all. Patient has multiple admission before with similar complaint and DDx with CHF exacerbation, previous admission in June 2017.  Pt denies fevers, chills, productive cough, abd pain, chest pain, nausea, vomiting, diarrhea, urinary symptoms or any other complaints.  cont current meds

## 2017-07-31 NOTE — PROGRESS NOTE ADULT - ASSESSMENT
88 y/o Italian speaking F AAO x 3 from home lives with daughter ambulate with cane at baseline PMH of severe systolic CHF (s/p BiV AICD 2010 EF 25% with G3 DD), DM (diet controlled) PVD and HTN. Pt presented to ED for progressive bilateral leg swelling, increased dyspnea on exertion, orthopnea over past 3 weeks as per daughter. Patient stated that she always has this SOB at baseline and can not ambulate much due to SOB. She has to sleep with 2-3 pillows at night and can not lie flat at all. Patient has multiple admission before with similar complaint and DDx with CHF exacerbation, previous admission in June 2017.  Pt denies fevers, chills, productive cough, abd pain, chest pain, nausea, vomiting, diarrhea, urinary symptoms or any other complaints.

## 2017-08-01 DIAGNOSIS — R53.81 OTHER MALAISE: ICD-10-CM

## 2017-08-01 DIAGNOSIS — E44.0 MODERATE PROTEIN-CALORIE MALNUTRITION: ICD-10-CM

## 2017-08-01 DIAGNOSIS — Z51.5 ENCOUNTER FOR PALLIATIVE CARE: ICD-10-CM

## 2017-08-01 LAB
APTT BLD: >200 SEC — CRITICAL HIGH (ref 27.5–37.4)
EPO SERPL-MCNC: 115.5 MIU/ML — HIGH (ref 2.6–18.5)
HCT VFR BLD CALC: 50.2 % — HIGH (ref 34.5–45)
HGB BLD-MCNC: 16.3 G/DL — HIGH (ref 11.5–15.5)
MCHC RBC-ENTMCNC: 32.5 GM/DL — SIGNIFICANT CHANGE UP (ref 32–36)
MCHC RBC-ENTMCNC: 33.3 PG — SIGNIFICANT CHANGE UP (ref 27–34)
MCV RBC AUTO: 102.7 FL — HIGH (ref 80–100)
PLATELET # BLD AUTO: 142 K/UL — LOW (ref 150–400)
RBC # BLD: 4.89 M/UL — SIGNIFICANT CHANGE UP (ref 3.8–5.2)
RBC # FLD: 15.1 % — HIGH (ref 10.3–14.5)
WBC # BLD: 14.4 K/UL — HIGH (ref 3.8–10.5)
WBC # FLD AUTO: 14.4 K/UL — HIGH (ref 3.8–10.5)

## 2017-08-01 PROCEDURE — 99223 1ST HOSP IP/OBS HIGH 75: CPT

## 2017-08-01 PROCEDURE — 99497 ADVNCD CARE PLAN 30 MIN: CPT | Mod: 25

## 2017-08-01 RX ORDER — MORPHINE SULFATE 50 MG/1
2 CAPSULE, EXTENDED RELEASE ORAL ONCE
Qty: 0 | Refills: 0 | Status: DISCONTINUED | OUTPATIENT
Start: 2017-08-01 | End: 2017-08-01

## 2017-08-01 RX ORDER — OXYCODONE HYDROCHLORIDE 5 MG/1
5 TABLET ORAL ONCE
Qty: 0 | Refills: 0 | Status: DISCONTINUED | OUTPATIENT
Start: 2017-08-01 | End: 2017-08-01

## 2017-08-01 RX ADMIN — Medication 81 MILLIGRAM(S): at 12:13

## 2017-08-01 RX ADMIN — OXYCODONE HYDROCHLORIDE 5 MILLIGRAM(S): 5 TABLET ORAL at 23:33

## 2017-08-01 NOTE — PROVIDER CONTACT NOTE (CRITICAL VALUE NOTIFICATION) - ACTION/TREATMENT ORDERED:
Heparin is on hold. No further action to be taken. Pt has no IV access. Dr. Jacome and Dr. Gomez are aware.

## 2017-08-01 NOTE — GOALS OF CARE CONVERSATION - PERSONAL ADVANCE DIRECTIVE - TREATMENT GUIDELINE COMMENT
MOLST: DNR / limited medical interventions / DNI / DNH / no feeding tube / trial period of IV fluids / use antibiotics. No surgeries; no aggressive measures.

## 2017-08-01 NOTE — PROGRESS NOTE ADULT - SUBJECTIVE AND OBJECTIVE BOX
88 y/o Welsh speaking F AAO x 3 from home lives with daughter ambulate with cane at baseline, PMH of severe systolic CHF (s/p BiV AICD 2010 EF 25% with G3 DD), DM (diet controlled) PVD and HTN. Pt presented to ED for progressive bilateral leg swelling, increased dyspnea on exertion, orthopnea over past 3 weeks as per daughter. Patient stated that she always has this SOB at baseline and can not ambulate much due to SOB. She has to sleep with 2-3 pillows at night and can not lie flat at all. Patient has multiple admission before with similar complaint and DDx with CHF exacerbation, previous admission in June 2017.  Pt denies fevers, chills, abd pain, chest pain, nausea, vomiting, diarrhea, urinary symptoms or any other complaints. Has cough productive of clear phlegm and leg edema.        INTERVAL HPI/OVERNIGHT EVENTS:      VITAL SIGNS:  T(F): 97.5 (08-01-17 @ 08:01)  HR: 78 (08-01-17 @ 08:01)  BP: 117/97 (08-01-17 @ 08:01)  RR: 16 (08-01-17 @ 08:01)  SpO2: 99% (08-01-17 @ 08:01)  Wt(kg): --  I&O's Detail    31 Jul 2017 07:01  -  01 Aug 2017 07:00  --------------------------------------------------------  IN:    heparin  Infusion.: 55 mL  Total IN: 55 mL    OUT:  Total OUT: 0 mL    Total NET: 55 mL              REVIEW OF SYSTEMS:    CONSTITUTIONAL:  No fevers, chills, sweats    HEENT:  Eyes:  No diplopia or blurred vision. ENT:  No earache, sore throat or runny nose.    CARDIOVASCULAR:  No pressure, squeezing, tightness, or heaviness about the chest; no palpitations.    RESPIRATORY:  Per HPI    GASTROINTESTINAL:  No abdominal pain, nausea, vomiting or diarrhea.    GENITOURINARY:  No dysuria, frequency or urgency.    NEUROLOGIC:  No paresthesias, fasciculations, seizures or weakness.    PSYCHIATRIC:  No disorder of thought or mood.      PHYSICAL EXAM:    Constitutional: Well developed and nourished  Eyes:Perrla  ENMT: normal  Neck:supple  Respiratory: good air entry  Cardiovascular: S1 S2 regular  Gastrointestinal: Soft, Non tender  Extremities: No edema  Vascular:normal  Neurological:Awake, alert,Ox3  Musculoskeletal:Normal      MEDICATIONS  (STANDING):  aspirin  chewable 81 milliGRAM(s) Oral daily  insulin lispro (HumaLOG) corrective regimen sliding scale   SubCutaneous three times a day before meals  furosemide   Injectable 40 milliGRAM(s) IV Push daily  digoxin     Tablet 0.25 milliGRAM(s) Oral daily    MEDICATIONS  (PRN):      Allergies    No Known Allergies    Intolerances        LABS:                        16.3   14.4  )-----------( 142      ( 01 Aug 2017 03:00 )             50.2     07-31    134<L>  |  96  |  63<H>  ----------------------------<  123<H>  4.3   |  19<L>  |  2.83<H>    Ca    9.1      31 Jul 2017 12:35  Phos  5.7     07-31  Mg     2.2     07-31    TPro  7.3  /  Alb  2.8<L>  /  TBili  3.4<H>  /  DBili  x   /  AST  122<H>  /  ALT  60  /  AlkPhos  125<H>  07-31    PT/INR - ( 31 Jul 2017 12:35 )   PT: 28.7 sec;   INR: 2.58 ratio         PTT - ( 01 Aug 2017 04:51 )  PTT:>200.0 sec      CARDIAC MARKERS ( 30 Jul 2017 16:24 )  0.668 ng/mL / x     / 70 U/L / x     / 3.7 ng/mL      CAPILLARY BLOOD GLUCOSE  103 (01 Aug 2017 08:01)  111 (31 Jul 2017 21:06)  113 (31 Jul 2017 16:12)  107 (31 Jul 2017 11:50)        pro-bnp [id number] 07-29 @ 19:08     d-dimer --  07-29 @ 19:08      RADIOLOGY & ADDITIONAL TESTS:    CXR:    IMPRESSION:  Mild diffuse interstitial prominence has improved since the prior study.   There is bibasilar subsegmental atelectasis and a trace pleural effusion.   No pneumothorax.    The cardiac silhouette is not accurately assessed by AP technique.   Left-sided triple lead ICD is reidentified; it perihepatic obscures the   left lower hemithorax.      Ct scan chest:      ekg;      echo: 88 y/o Bengali speaking F AAO x 3 from home lives with daughter ambulate with cane at baseline, PMH of severe systolic CHF (s/p BiV AICD 2010 EF 25% with G3 DD), DM (diet controlled) PVD and HTN. Pt presented to ED for progressive bilateral leg swelling, increased dyspnea on exertion, orthopnea over past 3 weeks as per daughter. Patient stated that she always has this SOB at baseline and can not ambulate much due to SOB. She has to sleep with 2-3 pillows at night and can not lie flat at all. Patient has multiple admission before with similar complaint and DDx with CHF exacerbation, previous admission in June 2017.  Pt denies fevers, chills, abd pain, chest pain, nausea, vomiting, diarrhea, urinary symptoms or any other complaints. Has cough productive of clear phlegm and leg edema. Awake, responsive, complaining of feeling tired. Family requested hospice care.        INTERVAL HPI/OVERNIGHT EVENTS:      VITAL SIGNS:  T(F): 97.5 (08-01-17 @ 08:01)  HR: 78 (08-01-17 @ 08:01)  BP: 117/97 (08-01-17 @ 08:01)  RR: 16 (08-01-17 @ 08:01)  SpO2: 99% (08-01-17 @ 08:01)  Wt(kg): --  I&O's Detail    31 Jul 2017 07:01  -  01 Aug 2017 07:00  --------------------------------------------------------  IN:    heparin  Infusion.: 55 mL  Total IN: 55 mL    OUT:  Total OUT: 0 mL    Total NET: 55 mL              REVIEW OF SYSTEMS:    CONSTITUTIONAL:  No fevers, chills, sweats    HEENT:  Eyes:  No diplopia or blurred vision. ENT:  No earache, sore throat or runny nose.    CARDIOVASCULAR:  No pressure, squeezing, tightness, or heaviness about the chest; no palpitations.    RESPIRATORY:  Per HPI    GASTROINTESTINAL:  No abdominal pain, nausea, vomiting or diarrhea.    GENITOURINARY:  No dysuria, frequency or urgency.    NEUROLOGIC:  No paresthesias, fasciculations, seizures or weakness.    PSYCHIATRIC:  No disorder of thought or mood.      PHYSICAL EXAM:    Constitutional: Well developed and nourished  Eyes:Perrla  ENMT: normal  Neck:supple  Respiratory: good air entry  Cardiovascular: S1 S2 regular  Gastrointestinal: Soft, Non tender  Extremities: No edema  Vascular:normal  Neurological:Awake, alert,Ox3  Musculoskeletal:Normal      MEDICATIONS  (STANDING):  aspirin  chewable 81 milliGRAM(s) Oral daily  insulin lispro (HumaLOG) corrective regimen sliding scale   SubCutaneous three times a day before meals  furosemide   Injectable 40 milliGRAM(s) IV Push daily  digoxin     Tablet 0.25 milliGRAM(s) Oral daily    MEDICATIONS  (PRN):      Allergies    No Known Allergies    Intolerances        LABS:                        16.3   14.4  )-----------( 142      ( 01 Aug 2017 03:00 )             50.2     07-31    134<L>  |  96  |  63<H>  ----------------------------<  123<H>  4.3   |  19<L>  |  2.83<H>    Ca    9.1      31 Jul 2017 12:35  Phos  5.7     07-31  Mg     2.2     07-31    TPro  7.3  /  Alb  2.8<L>  /  TBili  3.4<H>  /  DBili  x   /  AST  122<H>  /  ALT  60  /  AlkPhos  125<H>  07-31    PT/INR - ( 31 Jul 2017 12:35 )   PT: 28.7 sec;   INR: 2.58 ratio         PTT - ( 01 Aug 2017 04:51 )  PTT:>200.0 sec      CARDIAC MARKERS ( 30 Jul 2017 16:24 )  0.668 ng/mL / x     / 70 U/L / x     / 3.7 ng/mL      CAPILLARY BLOOD GLUCOSE  103 (01 Aug 2017 08:01)  111 (31 Jul 2017 21:06)  113 (31 Jul 2017 16:12)  107 (31 Jul 2017 11:50)        pro-bnp [id number] 07-29 @ 19:08     d-dimer --  07-29 @ 19:08      RADIOLOGY & ADDITIONAL TESTS:    CXR:    IMPRESSION:  Mild diffuse interstitial prominence has improved since the prior study.   There is bibasilar subsegmental atelectasis and a trace pleural effusion.   No pneumothorax.    The cardiac silhouette is not accurately assessed by AP technique.   Left-sided triple lead ICD is reidentified; it perihepatic obscures the   left lower hemithorax.      Ct scan chest:      ekg;      echo:

## 2017-08-01 NOTE — DISCHARGE NOTE ADULT - PATIENT PORTAL LINK FT
“You can access the [facility] Patient Portal, offered by [facility], by registering with the following website: [email or web address]”

## 2017-08-01 NOTE — PROGRESS NOTE ADULT - PROBLEM SELECTOR PLAN 1
EF 20 % chest xary today congestion improved from last night   c/w lasix, Digoxin added today as per Dr Gomes    w/ monitor Dig level in am   holding coreg now as BP < 90

## 2017-08-01 NOTE — CONSULT NOTE ADULT - PROBLEM SELECTOR RECOMMENDATION 4
[facility]  ID#[id number]. Met with patient and her daughter/HCP, Renita, face-to-face. Patient deferring to daughter. Daughter acknowledge's patient's poor prognosis. Discussed goals of care and advance directives - requests patient to return home and be comfortable. Patient with HHA - wants patient to be evaluated to see if she qualifies for additional hours. Aware patient is appropriate for home hospice - agreeable. Discussed advance directives - DNR / DNI on file. Reviewed MOLST options - added limited medical interventions, DNH, no feeding tube, trial period of IV fluids, use antibiotics; no surgeries; no aggressive measures. MOLST form completed and placed on chart. All questions answered. Supportive counseling provided. [facility]  ID#[id number]. Met with patient and her daughter/HCP, Renita, face-to-face. Patient deferring to daughter. Daughter acknowledges patient's poor prognosis. Discussed goals of care and advance directives - requests patient to return home and be comfortable. Aware patient is appropriate for home hospice - agreeable. Discussed advance directives - DNR / DNI on file. Reviewed MOLST options - limited medical interventions, DNH, no feeding tube, trial period of IV fluids, use antibiotics; no surgeries; no aggressive measures. MOLST form completed and placed on chart. All questions answered. Supportive counseling provided. [facility]  ID#[id number]. Met with patient and her daughter/HCP, Renita, face-to-face. Patient deferring to daughter. Daughter acknowledges patient's poor prognosis. Discussed goals of care and advance directives - requests patient to return home and be comfortable. Aware patient is appropriate for home hospice - requests list of home hospice agencies. Discussed advance directives - DNR / DNI on file. Reviewed MOLST options - limited medical interventions, DNH, no feeding tube, trial period of IV fluids, use antibiotics; no surgeries; no aggressive measures. MOLST form completed and placed on chart. All questions answered. Supportive counseling provided.

## 2017-08-01 NOTE — DISCHARGE NOTE ADULT - PLAN OF CARE
maintain EF, medication compliance and avoid volume overload patient on fluid restriction, end stage heart failure, continue with conservative management rate control, avoid thrombo- embolic events patient not a candidate for anticoagulation, maintain blood sugars< 130 and HbA1c < 7 continue with home medications maintain SBP < 140 patient is on Lasix 40 mg OD and coreg 25 mg BID   currently BP < 110 holding her coreg, patient needs to follow up primary care to restart her BP medications Goals of care discussed with the family family wants home hospice

## 2017-08-01 NOTE — CONSULT NOTE ADULT - PROBLEM SELECTOR PROBLEM 1
Acute on chronic systolic congestive heart failure
Acute on chronic systolic congestive heart failure

## 2017-08-01 NOTE — PROGRESS NOTE ADULT - ASSESSMENT
89 y/o Nepalese speaking F AAO x 3 admitted with congestive heart failure and Afib not on any anti coagulation, goals of care discussed  with HCP pt is DNR/DNI   palliative care Dr Yu has seen the patient family agree for home hospice comfort care.

## 2017-08-01 NOTE — PROGRESS NOTE ADULT - SUBJECTIVE AND OBJECTIVE BOX
Patient is a 89y old  Female who presents with a chief complaint of SOB (01 Aug 2017 01:41)      INTERVAL HPI/OVERNIGHT EVENTS:  pt seen and examined t bed side, pt was sitting in chair comfortably, denies any complaints. no SOB or chest pain,      MEDICATIONS  (STANDING):  aspirin  chewable 81 milliGRAM(s) Oral daily  insulin lispro (HumaLOG) corrective regimen sliding scale   SubCutaneous three times a day before meals  furosemide   Injectable 40 milliGRAM(s) IV Push daily  digoxin     Tablet 0.25 milliGRAM(s) Oral daily    MEDICATIONS  (PRN):      Allergies    No Known Allergies    Intolerances        Vital Signs Last 24 Hrs  T(C): 36.3 (01 Aug 2017 12:31), Max: 36.8 (31 Jul 2017 15:57)  T(F): 97.3 (01 Aug 2017 12:31), Max: 98.2 (31 Jul 2017 15:57)  HR: 79 (01 Aug 2017 12:31) (78 - 100)  BP: 106/49 (01 Aug 2017 12:31) (91/60 - 117/97)  BP(mean): --  RR: 18 (01 Aug 2017 12:31) (16 - 19)  SpO2: 94% (01 Aug 2017 12:31) (94% - 100%)    PHYSICAL EXAM:  GENERAL: NAD, well-groomed, well-developed  HEENT: Supple, No JVD, Normal thyroid  NERVOUS SYSTEM:  Alert & Oriented X3, Good concentration;  CHEST/LUNG: left side crackles presrnt,  HEART: Regular rate and rhythm; No murmurs, rubs, or gallops  ABDOMEN: Soft, Nontender, Nondistended; Bowel sounds present  EXTREMITIES: b/l leg edema +  SKIN: no rashes      LABS:                        16.3   14.4  )-----------( 142      ( 01 Aug 2017 03:00 )             50.2     07-31    134<L>  |  96  |  63<H>  ----------------------------<  123<H>  4.3   |  19<L>  |  2.83<H>    Ca    9.1      31 Jul 2017 12:35  Phos  5.7     07-31  Mg     2.2     07-31    TPro  7.3  /  Alb  2.8<L>  /  TBili  3.4<H>  /  DBili  x   /  AST  122<H>  /  ALT  60  /  AlkPhos  125<H>  07-31    PT/INR - ( 31 Jul 2017 12:35 )   PT: 28.7 sec;   INR: 2.58 ratio         PTT - ( 01 Aug 2017 04:51 )  PTT:>200.0 sec    CAPILLARY BLOOD GLUCOSE  86 (01 Aug 2017 12:31)  103 (01 Aug 2017 08:01)  111 (31 Jul 2017 21:06)  113 (31 Jul 2017 16:12)          RADIOLOGY & ADDITIONAL TESTS:    Imaging Personally Reviewed:  [ x] YES  [ ] NO    Consultant(s) Notes Reviewed:  [x ] YES  [ ] NO    Care Discussed with Consultants/Other Providers [x ] YES  [ ] NO    Plan of Care discussed with Attending/PGY2 x[ ]YES [ ] NO

## 2017-08-01 NOTE — DISCHARGE NOTE ADULT - NS AS DC HF EDUCATION INSTRUCTIONS
Call Primary Care Provider for follow-up after discharge/Monitor Weight Daily/Report weight gain of 2 or more pounds in one day or 3 or more pounds in one week, worsening shortness of breath, fatigue, weakness, increased swelling of hands and feet to primary care provider/Low salt diet/Activities as tolerated

## 2017-08-01 NOTE — PROGRESS NOTE ADULT - SUBJECTIVE AND OBJECTIVE BOX
Patient is a 89y old  Female who presents with a chief complaint of SOB (01 Aug 2017 01:41)    pt seen in tele [x  ], reg med floor [   ], bed [x  ], chair at bedside [   ], a+o x3 [x  ], lethargic [  ],  nad [ x ]    dnr/dni      Allergies    No Known Allergies        Vitals    T(F): 97.3 (08-01-17 @ 12:31), Max: 98.2 (07-31-17 @ 15:57)  HR: 79 (08-01-17 @ 12:31) (78 - 100)  BP: 106/49 (08-01-17 @ 12:31) (91/60 - 117/97)  RR: 18 (08-01-17 @ 12:31) (16 - 19)  SpO2: 94% (08-01-17 @ 12:31) (94% - 100%)  Wt(kg): --  CAPILLARY BLOOD GLUCOSE  86 (01 Aug 2017 12:31)          Labs                          16.3   14.4  )-----------( 142      ( 01 Aug 2017 03:00 )             50.2       07-31    134<L>  |  96  |  63<H>  ----------------------------<  123<H>  4.3   |  19<L>  |  2.83<H>    Ca    9.1      31 Jul 2017 12:35  Phos  5.7     07-31  Mg     2.2     07-31    TPro  7.3  /  Alb  2.8<L>  /  TBili  3.4<H>  /  DBili  x   /  AST  122<H>  /  ALT  60  /  AlkPhos  125<H>  07-31      CARDIAC MARKERS ( 30 Jul 2017 16:24 )  0.668 ng/mL / x     / 70 U/L / x     / 3.7 ng/mL            Radiology Results      Meds    MEDICATIONS  (STANDING):  aspirin  chewable 81 milliGRAM(s) Oral daily  insulin lispro (HumaLOG) corrective regimen sliding scale   SubCutaneous three times a day before meals  furosemide   Injectable 40 milliGRAM(s) IV Push daily  digoxin     Tablet 0.25 milliGRAM(s) Oral daily      MEDICATIONS  (PRN):      Physical Exam    Neuro :  no focal deficits  Respiratory: B/L basal crackles  CV: RRR, S1S2, no murmurs,   Abdominal: Soft, NT, ND +BS,  Extremities: b/l le edema, + peripheral pulses    ASSESSMENT    Acute on chronic systolic congestive heart failure  afib  r/o acs  h/o Type 2 diabetes mellitus without complication, without long-term current use of insulin  Acute on chronic systolic congestive heart failure  Peripheral Vascular Disease  Mitral Regurgitation  Cardiomyopathy  History of Hypertension  PVD (Peripheral Vascular Disease)  HTN (Hypertension)  Artificial pacemaker        PLAN      pt with poor prognosis  pt deterioriating  end stage chf  cardio f/u noted89 y/o Niuean speaking F AAO x 3 from home lives with daughter ambulate with cane at baseline PMH of severe systolic CHF (s/p BiV AICD 2010 EF 25% with G3 DD), DM (diet controlled) PVD and HTN. Pt presented to ED for progressive bilateral leg swelling, increased dyspnea on exertion, orthopnea over past 3 weeks as per daughter. Patient stated that she always has this SOB at baseline and can not ambulate much due to SOB. She has to sleep with 2-3 pillows at night and can not lie flat at all. Patient has multiple admission before with similar complaint and DDx with CHF exacerbation, previous admission in June 2017.  Pt denies fevers, chills, productive cough, abd pain, chest pain, nausea, vomiting, diarrhea, urinary symptoms or any other complaints.  pulm f/u noted  hospice/ paliative eval noted  pt qualifies for home hospice  soc wk eval for choices of hospice care agencies  cont current meds  d/c plan for home hospice

## 2017-08-01 NOTE — CONSULT NOTE ADULT - PROBLEM SELECTOR RECOMMENDATION 3
Secondary to end stage HF. Patient with increasing fatigue/weakness per report; mostly bed/chair bound now. Has HHA 6hrs / day x 5 days/ week.
EKG with Afib with RVR and widened QRS complex.   no history of Afib on previous admission and unknown to patient.   On coreg and started on Heparin drip. JULIO CESAR VASC score 7.

## 2017-08-01 NOTE — CONSULT NOTE ADULT - PROBLEM SELECTOR RECOMMENDATION 9
Patient with End stage systolic CHF (s/p BiV AICD 2010 EF 25% with G3 DD; previous echo with global systolic dysfunction and CXR with bilateral congestion. Patient with BLE edema. Increasing fatigue/weakness per patient/family report. Spoke with patient and family - wish for comfort care; acknowledge poor prognosis. Patient could benefit from home hospice - HCP agreeable. MOLST form completed. Patient is a DNR/DNI. Patient with end stage systolic CHF (s/p BiV AICD 2010 EF 25% with G3 DD) per cardio. Patient with BLE edema. Patient with increasing fatigue/weakness/SOB per patient/family report. Spoke with patient and family - wish for comfort care; acknowledge poor prognosis. Patient could benefit from home hospice - HCP agreeable. MOLST form completed. Patient is a DNR/DNI. Patient with end stage systolic CHF (s/p BiV AICD 2010 EF 25% with G3 DD) per cardio. Patient with BLE edema. Patient with increasing fatigue/weakness/SOB per patient/family report. Spoke with patient and family - wish for comfort care; acknowledge poor prognosis. Patient could benefit from home hospice - HCP will review list of home hospice agencies. MOLST form completed. Patient is a DNR/DNI.

## 2017-08-01 NOTE — PROGRESS NOTE ADULT - PROBLEM SELECTOR PLAN 2
afib not controlled   patient is not on coreg still Bp<90  continue with heparin drip and monitor pTT  - will discuss about anti- coagulations in am

## 2017-08-01 NOTE — DISCHARGE NOTE ADULT - HOSPITAL COURSE
88 y/o Syriac speaking F AAO x 3 from home lives with daughter ambulate with cane at baseline PMH of severe systolic CHF (s/p BiV AICD 2010 EF 25% with G3 DD), DM (diet controlled) PVD and HTN. Pt presented to ED for progressive bilateral leg swelling, increased dyspnea on exertion, orthopnea over past 3 weeks as per daughter. Patient stated that she always has this SOB at baseline and can not ambulate much due to SOB. She has to sleep with 2-3 pillows at night and can not lie flat at all. Patient has multiple admission before with similar complaint and DDx with CHF exacerbation, previous admission in June 2017.  Pt denies fevers, chills, productive cough, abd pain, chest pain, nausea, vomiting, diarrhea, urinary symptoms or any other complaints    Patient admitted with Acute CHF exacerbation  Acute on chronic systolic congestive heart failure Previous echo with global systolic dysfunction and CXR with bilateral congestion. BNP 05976 with BL leg edema. Pt is been treated with iv lasix 40 mg, fluid restriction, strict I/O. iv lasix cahnges to po lasix,    Afib with RVR, Dr Gomes followed the patient, started pt in Digoxin 0.25 mg  rate  well controlled, pt is not a candidate for anti - coagulation, patient is on coreg at home, currently holding her coreg since patient has low BP.  Palliative care was consulted Goals of care discussed with her daughter/HCP, Reinta, . Daughter acknowledges patient's poor prognosis. Discussed goals of care and advance directives - requests patient to return home and be comfortable.      Plan of care discussed with the family and the Attending. Patient is been discharged to Home with A

## 2017-08-01 NOTE — PROGRESS NOTE ADULT - PROBLEM SELECTOR PLAN 3
Daughter acknowledges patient's poor prognosis. Discussed goals of care and advance directives - requests patient to return home and be comfortable. Aware patient is appropriate for home hospice - agreeable. Discussed advance directives - DNR / DNI on file. Reviewed MOLST options - limited medical interventions, DNH, no feeding tube, trial period of IV fluids, use antibiotics; no surgeries; no aggressive measures. MOLST form completed and placed on chart. All questions answered. Supportive counseling provided..

## 2017-08-01 NOTE — CONSULT NOTE ADULT - NSHPATTENDINGPLANDISCUSS_GEN_ALL_CORE
Dr. Torres, Dr. Patel, primary care team, social work. Dr. Torres, Dr. Patel, primary care team, social work

## 2017-08-01 NOTE — DISCHARGE NOTE ADULT - MEDICATION SUMMARY - MEDICATIONS TO STOP TAKING
I will STOP taking the medications listed below when I get home from the hospital:    Coreg 25 mg oral tablet  -- 1 tab(s) by mouth 2 times a day

## 2017-08-01 NOTE — DISCHARGE NOTE ADULT - CARE PROVIDER_API CALL
Shaq Patel), Internal Medicine; Pulmonary Disease  [address] [address] [place] [address]  Phone: [phone]  Fax: [phone]

## 2017-08-01 NOTE — CONSULT NOTE ADULT - PROBLEM SELECTOR RECOMMENDATION 2
Secondary to end stage HF. Patient reports "I don't want to eat." albumin 2.8. No feeding tube per MOLST.

## 2017-08-01 NOTE — CONSULT NOTE ADULT - SUBJECTIVE AND OBJECTIVE BOX
HPI:  89 y/o F presented to ED for progressive bilateral leg swelling, increased dyspnea on exertion, orthopnea over past 3 weeks as per daughter; admitted for acute on chronic systolic HF. Patient stated that she always has this SOB at baseline and can not ambulate much due to SOB. She has to sleep with 2-3 pillows at night and can not lie flat at all. Patient has multiple admission before with similar complaint and DDx with CHF exacerbation, previous admission in June 2017.  To note, patient with severe systolic CHF (s/p BiV AICD 2010 EF 25% with G3 DD). Palliative consult to evaluate for home hospice.    PAST MEDICAL & SURGICAL HISTORY:  Type 2 diabetes mellitus without complication, without long-term current use of insulin  Acute on chronic systolic congestive heart failure  Peripheral Vascular Disease  Mitral Regurgitation  Cardiomyopathy  History of Hypertension  PVD (Peripheral Vascular Disease)  Artificial pacemaker      SOCIAL HISTORY:    Admitted from:  home, HHA 6hr/day x 5 days/week. Lives with 2nd daughter, Mary.  Tobacco hx:  none                                  Home Opioid hx: none  Evangelical:  Yazdanism                                  Preferred Language: Ethiopian    Surrogate/HCP/Guardian:   Renita Arguello (dtr/HCP) [phone]    FAMILY HISTORY: noncontributory to current condition  No pertinent family history in first degree relatives    Baseline ADLs (prior to admission): ambulatory with cane, A&O x 3, but becoming mostly bed/chair bound due to increasing fatigue/weakness/SOB    No Known Allergies    Intolerances      Present Symptoms:   Dyspnea: none  Nausea/Vomiting: none   Fatigue: moderate  Loss of appetite: "I don't want to eat"  Pain: none                                   Review of Systems: All others negative     MEDICATIONS  (STANDING):  aspirin  chewable 81 milliGRAM(s) Oral daily  insulin lispro (HumaLOG) corrective regimen sliding scale   SubCutaneous three times a day before meals  furosemide   Injectable 40 milliGRAM(s) IV Push daily  digoxin     Tablet 0.25 milliGRAM(s) Oral daily    MEDICATIONS  (PRN):      PHYSICAL EXAM:    Vital Signs Last 24 Hrs  T(C): 36.3 (01 Aug 2017 12:31), Max: 36.8 (31 Jul 2017 15:57)  T(F): 97.3 (01 Aug 2017 12:31), Max: 98.2 (31 Jul 2017 15:57)  HR: 79 (01 Aug 2017 12:31) (78 - 100)  BP: 106/49 (01 Aug 2017 12:31) (91/60 - 117/97)  BP(mean): --  RR: 18 (01 Aug 2017 12:31) (16 - 19)  SpO2: 94% (01 Aug 2017 12:31) (94% - 100%)    General: alert, oriented, deferring to daughterRenita at bedside. Minimal verbalization during exam.   Karnofsky Performance Score/Palliative Performance Status Version2:  30 %    HEENT: dry lips  Lungs: comfortable  CV: normal   GI: normal t  :  incontinent   Musculoskeletal: weakness x 4, +2 bilateral  strength, +3 BLE edema, mostly bed/chair bound  Skin: +3 BLE edema, cool BLE   Neuro: no deficits   Oral intake ability: minimal  Diet: DASH/TLC    LABS:                        16.3   14.4  )-----------( 142      ( 01 Aug 2017 03:00 )             50.2     07-31    134<L>  |  96  |  63<H>  ----------------------------<  123<H>  4.3   |  19<L>  |  2.83<H>    Ca    9.1      31 Jul 2017 12:35  Phos  5.7     07-31  Mg     2.2     07-31    TPro  7.3  /  Alb  2.8<L>  /  TBili  3.4<H>  /  DBili  x   /  AST  122<H>  /  ALT  60  /  AlkPhos  125<H>  07-31        RADIOLOGY & ADDITIONAL STUDIES:  < from: Xray Chest 1 View AP -PORTABLE-Routine (07.31.17 @ 09:13) >  EXAM:  CHEST PORTABLE ROUTINE                        PROCEDURE DATE:  07/31/2017    INTERPRETATION:  PORTABLE CHEST X-RAY  HISTORY: pul congestion.   COMPARISON: 7/29/2017.  FINDINGS/  IMPRESSION:  Mild diffuse interstitial prominence has improved since the prior study.   There is bibasilar subsegmental atelectasis and a trace pleural effusion.   No pneumothorax.  The cardiac silhouette is not accurately assessed by AP technique.   Left-sided triple lead ICD is reidentified; it perihepatic obscures the   left lower hemithorax.  < end of copied text >      ADVANCE DIRECTIVES: MOLST: DNR / limited medical interventions / DNI / DNH / no feeding tube / trial period of IV fluids, use antibiotics. No surgeries; no aggressive measures.  Advanced Care Planning discussion total time spent:  30 minutes. Met with patient and her daughter/HCP face-to-face. Discussed advance directives - MOLST completed. HPI:  89 y/o F presented to ED for progressive bilateral leg swelling, increased dyspnea on exertion, orthopnea over past 3 weeks as per daughter; admitted for acute on chronic systolic HF. Patient stated that she always has this SOB at baseline and can not ambulate much due to SOB. She has to sleep with 2-3 pillows at night and can not lie flat at all. Patient has multiple admission before with similar complaint and DDx with CHF exacerbation, previous admission in June 2017.  To note, patient with severe systolic CHF (s/p BiV AICD 2010 EF 25% with G3 DD) per cardio.  Palliative consult to evaluate for home hospice.    PAST MEDICAL & SURGICAL HISTORY:  Type 2 diabetes mellitus without complication, without long-term current use of insulin  Acute on chronic systolic congestive heart failure  Peripheral Vascular Disease  Mitral Regurgitation  Cardiomyopathy  History of Hypertension  PVD (Peripheral Vascular Disease)  Artificial pacemaker      SOCIAL HISTORY:    Admitted from:  home, HHA 6hr/day x 5 days/week. Lives with 2nd daughter, Mary.  Tobacco hx:  none                                  Home Opioid hx: none  Rastafarian:  Restorationism                                  Preferred Language: Portuguese    Surrogate/HCP/Guardian:   Renita Arguello (dtr/HCP) [phone]    FAMILY HISTORY: noncontributory to current condition  No pertinent family history in first degree relatives    Baseline ADLs (prior to admission): ambulatory with cane, A&O x 3, but becoming mostly bed/chair bound due to increasing fatigue/weakness/SOB    No Known Allergies    Intolerances      Present Symptoms:   Dyspnea: none  Nausea/Vomiting: none   Fatigue: moderate  Loss of appetite: "I don't want to eat"  Pain: none                                   Review of Systems: All others negative     MEDICATIONS  (STANDING):  aspirin  chewable 81 milliGRAM(s) Oral daily  insulin lispro (HumaLOG) corrective regimen sliding scale   SubCutaneous three times a day before meals  furosemide   Injectable 40 milliGRAM(s) IV Push daily  digoxin     Tablet 0.25 milliGRAM(s) Oral daily    MEDICATIONS  (PRN):      PHYSICAL EXAM:    Vital Signs Last 24 Hrs  T(C): 36.3 (01 Aug 2017 12:31), Max: 36.8 (31 Jul 2017 15:57)  T(F): 97.3 (01 Aug 2017 12:31), Max: 98.2 (31 Jul 2017 15:57)  HR: 79 (01 Aug 2017 12:31) (78 - 100)  BP: 106/49 (01 Aug 2017 12:31) (91/60 - 117/97)  BP(mean): --  RR: 18 (01 Aug 2017 12:31) (16 - 19)  SpO2: 94% (01 Aug 2017 12:31) (94% - 100%)    General: alert, oriented, deferring to daughterRenita at bedside. Minimal verbalization during exam.   Karnofsky Performance Score/Palliative Performance Status Version2:  30 %    HEENT: dry lips  Lungs: comfortable  CV: normal   GI: normal t  :  incontinent   Musculoskeletal: weakness x 4, +2 bilateral  strength, +3 BLE edema, mostly bed/chair bound  Skin: +3 BLE edema, cool BLE   Neuro: no deficits   Oral intake ability: minimal  Diet: DASH/TLC    LABS:                        16.3   14.4  )-----------( 142      ( 01 Aug 2017 03:00 )             50.2     07-31    134<L>  |  96  |  63<H>  ----------------------------<  123<H>  4.3   |  19<L>  |  2.83<H>    Ca    9.1      31 Jul 2017 12:35  Phos  5.7     07-31  Mg     2.2     07-31    TPro  7.3  /  Alb  2.8<L>  /  TBili  3.4<H>  /  DBili  x   /  AST  122<H>  /  ALT  60  /  AlkPhos  125<H>  07-31        RADIOLOGY & ADDITIONAL STUDIES:  < from: Xray Chest 1 View AP -PORTABLE-Routine (07.31.17 @ 09:13) >  EXAM:  CHEST PORTABLE ROUTINE                        PROCEDURE DATE:  07/31/2017    INTERPRETATION:  PORTABLE CHEST X-RAY  HISTORY: pul congestion.   COMPARISON: 7/29/2017.  FINDINGS/  IMPRESSION:  Mild diffuse interstitial prominence has improved since the prior study.   There is bibasilar subsegmental atelectasis and a trace pleural effusion.   No pneumothorax.  The cardiac silhouette is not accurately assessed by AP technique.   Left-sided triple lead ICD is reidentified; it perihepatic obscures the   left lower hemithorax.  < end of copied text >      ADVANCE DIRECTIVES: MOLST: DNR / limited medical interventions / DNI / DNH / no feeding tube / trial period of IV fluids, use antibiotics. No surgeries; no aggressive measures.  Advanced Care Planning discussion total time spent:  30 minutes. Met with patient and her daughter/HCP face-to-face. Discussed advance directives - MOLST completed.

## 2017-08-01 NOTE — DISCHARGE NOTE ADULT - MEDICATION SUMMARY - MEDICATIONS TO TAKE
I will START or STAY ON the medications listed below when I get home from the hospital:    aspirin 81 mg oral tablet, chewable  -- 1 tab(s) by mouth once a day  -- Indication: For Prophylaxis     digoxin 250 mcg (0.25 mg) oral tablet  -- 1 tab(s) by mouth once a day  -- Indication: For Afib    furosemide 40 mg oral tablet  -- 1 tab(s) by mouth once a day  -- Indication: For CHF

## 2017-08-01 NOTE — PROGRESS NOTE ADULT - SUBJECTIVE AND OBJECTIVE BOX
SUBJ:89 y/o Azeri speaking F AAO x 3 from home lives with daughter ambulate with cane at baseline PMH of severe systolic CHF (s/p BiV AICD 2010 EF 25% with G3 DD), DM (diet controlled) PVD and HTN. Pt presented to ED for progressive bilateral leg swelling, increased dyspnea on exertion, orthopnea over past 3 weeks condition has deteriorated,is less responsive in no distress,discussed with Daughter and oqglxmbz-tuozm-[id number] comfort care,noted episode asystole despite ICD.      MEDICATIONS  (STANDING):  aspirin  chewable 81 milliGRAM(s) Oral daily  insulin lispro (HumaLOG) corrective regimen sliding scale   SubCutaneous three times a day before meals  furosemide   Injectable 40 milliGRAM(s) IV Push daily  digoxin     Tablet 0.25 milliGRAM(s) Oral daily    MEDICATIONS  (PRN):            Vital Signs Last 24 Hrs  T(C): 36.4 (01 Aug 2017 08:01), Max: 36.8 (31 Jul 2017 15:57)  T(F): 97.5 (01 Aug 2017 08:01), Max: 98.2 (31 Jul 2017 15:57)  HR: 78 (01 Aug 2017 08:01) (78 - 100)  BP: 117/97 (01 Aug 2017 08:01) (91/60 - 117/97)  RR: 16 (01 Aug 2017 08:01) (16 - 19)  SpO2: 99% (01 Aug 2017 08:01) (97% - 100%)    REVIEW OF SYSTEMS:Patient is poorly responsive    PHYSICAL EXAM:  · CONSTITUTIONAL:	Well-developed, well nourished    BMI-27  · EYES:	EOMI; PERRL; no drainage or redness  · ENMT:	No oral lesions; no gross abnormalities  · NECK:	No bruits; no thyromegaly or nodules  ·BACK:	No deformity or limitation of movement  ·RESPIRATORY:   airway patent; breath sounds equal; fair air movement; respirations non-labored; ; no chest wall tenderness; no intercostal retractions; no rales, bilateral rhonchi no wheeze  · CARDIOVASCULAR	Regular rate and rhythm  no rub 3/6 systolic murmur  normal PMI  . GASTROINTESTINAL:  no distention; no masses palpable; bowel sounds normal; no rebound tenderness; no guarding; no rigidity; no organomegaly  · EXTREMITIES: No cyanosis, clubbing 1+ edema  · VASCULAR: 	Equal and normal pulses (carotid, femoral, dorsalis pedis)  ·NEUROLOGICAL:   alert and oriented x 3; sensation intact; deep reflexes intact; cranial nerves intact; no spontaneous movement; superficial reflexes intact; normal strength  · SKIN:	No lesions; no rash  . LYMPH NODES:	No lymphadedenopathy  · MUSCULOSKELETAL:   No calf tenderness  no joint swelling.  	  TELEMETRY:Ventricular paced rhythm    LABS:                        16.3   14.4  )-----------( 142      ( 01 Aug 2017 03:00 )             50.2     07-31    134<L>  |  96  |  63<H>  ----------------------------<  123<H>  4.3   |  19<L>  |  2.83<H>    Ca    9.1      31 Jul 2017 12:35  Phos  5.7     07-31  Mg     2.2     07-31    TPro  7.3  /  Alb  2.8<L>  /  TBili  3.4<H>  /  DBili  x   /  AST  122<H>  /  ALT  60  /  AlkPhos  125<H>  07-31    CARDIAC MARKERS ( 30 Jul 2017 16:24 )  0.668 ng/mL / x     / 70 U/L / x     / 3.7 ng/mL      PT/INR - ( 31 Jul 2017 12:35 )   PT: 28.7 sec;   INR: 2.58 ratio         PTT - ( 01 Aug 2017 04:51 )  PTT:>200.0 sec    I&O's Summary    31 Jul 2017 07:01  -  01 Aug 2017 07:00  --------------------------------------------------------  IN: 55 mL / OUT: 0 mL / NET: 55 mL          IMPRESSION AND PLAN:    Acute on chronic systolic congestive heart failure.  Plan: Previous echo with global systolic dysfunction and CXR with bilateral congestion. BNP 51787 with BL edema.Her condition is deteriorating and she is less responsive-End Stage HF agree with Family decision for comfort care-Consider Home Hospice.No Heparingtt,blood draws as per Family request.

## 2017-08-01 NOTE — DISCHARGE NOTE ADULT - CARE PLAN
Principal Discharge DX:	Acute on chronic systolic congestive heart failure  Goal:	maintain EF, medication compliance and avoid volume overload  Instructions for follow-up, activity and diet:	patient on fluid restriction, end stage heart failure, continue with conservative management  Secondary Diagnosis:	Atrial fibrillation with RVR  Goal:	rate control, avoid thrombo- embolic events  Instructions for follow-up, activity and diet:	patient not a candidate for anticoagulation,  Secondary Diagnosis:	Type 2 diabetes mellitus without complication, without long-term current use of insulin  Goal:	maintain blood sugars< 130 and HbA1c < 7  Instructions for follow-up, activity and diet:	continue with home medications  Secondary Diagnosis:	History of Hypertension  Goal:	maintain SBP < 140  Instructions for follow-up, activity and diet:	patient is on Lasix 40 mg OD and coreg 25 mg BID   currently BP < 110 holding her coreg, patient needs to follow up primary care to restart her BP medications  Secondary Diagnosis:	Palliative care encounter  Goal:	Goals of care discussed with the family  Instructions for follow-up, activity and diet:	family wants home hospice

## 2017-08-01 NOTE — DISCHARGE NOTE ADULT - SECONDARY DIAGNOSIS.
Atrial fibrillation with RVR Type 2 diabetes mellitus without complication, without long-term current use of insulin History of Hypertension Palliative care encounter

## 2017-08-02 RX ORDER — DIGOXIN 250 MCG
1 TABLET ORAL
Qty: 0 | Refills: 0 | COMMUNITY
Start: 2017-08-02

## 2017-08-02 RX ORDER — ASPIRIN/CALCIUM CARB/MAGNESIUM 324 MG
1 TABLET ORAL
Qty: 0 | Refills: 0 | COMMUNITY

## 2017-08-02 RX ORDER — DIGOXIN 250 MCG
1 TABLET ORAL
Qty: 30 | Refills: 0 | OUTPATIENT
Start: 2017-08-02 | End: 2017-09-01

## 2017-08-02 RX ORDER — FUROSEMIDE 40 MG
2 TABLET ORAL
Qty: 0 | Refills: 0 | COMMUNITY

## 2017-08-02 RX ORDER — FUROSEMIDE 40 MG
40 TABLET ORAL DAILY
Qty: 0 | Refills: 0 | Status: DISCONTINUED | OUTPATIENT
Start: 2017-08-02 | End: 2017-08-03

## 2017-08-02 RX ORDER — FUROSEMIDE 40 MG
1 TABLET ORAL
Qty: 0 | Refills: 0 | COMMUNITY
Start: 2017-08-02

## 2017-08-02 RX ORDER — ASPIRIN/CALCIUM CARB/MAGNESIUM 324 MG
1 TABLET ORAL
Qty: 0 | Refills: 0 | COMMUNITY
Start: 2017-08-02

## 2017-08-02 RX ORDER — CARVEDILOL PHOSPHATE 80 MG/1
1 CAPSULE, EXTENDED RELEASE ORAL
Qty: 0 | Refills: 0 | COMMUNITY

## 2017-08-02 RX ADMIN — OXYCODONE HYDROCHLORIDE 5 MILLIGRAM(S): 5 TABLET ORAL at 00:30

## 2017-08-02 RX ADMIN — Medication 0.25 MILLIGRAM(S): at 05:43

## 2017-08-02 RX ADMIN — Medication 81 MILLIGRAM(S): at 12:52

## 2017-08-02 RX ADMIN — Medication 40 MILLIGRAM(S): at 12:52

## 2017-08-02 NOTE — PROGRESS NOTE ADULT - SUBJECTIVE AND OBJECTIVE BOX
Patient is a 89y old  Female who presents with a chief complaint of SOB (01 Aug 2017 01:41)    pt seen in tele [x  ], reg med floor [   ], bed [  ], chair at bedside [ x  ], awake and responsive [x  ], lethargic [  ],  nad [x  ]      Allergies    No Known Allergies        Vitals    T(F): 98.5 (08-02-17 @ 08:12), Max: 98.6 (08-01-17 @ 23:53)  HR: 83 (08-02-17 @ 08:12) (70 - 89)  BP: 127/68 (08-02-17 @ 08:12) (103/61 - 127/68)  RR: 19 (08-02-17 @ 08:12) (18 - 19)  SpO2: 100% (08-02-17 @ 08:12) (92% - 100%)  Wt(kg): --  CAPILLARY BLOOD GLUCOSE  84 (02 Aug 2017 12:20)          Labs                          16.3   14.4  )-----------( 142      ( 01 Aug 2017 03:00 )             50.2       Radiology Results      Meds    MEDICATIONS  (STANDING):  aspirin  chewable 81 milliGRAM(s) Oral daily  insulin lispro (HumaLOG) corrective regimen sliding scale   SubCutaneous three times a day before meals  digoxin     Tablet 0.25 milliGRAM(s) Oral daily  furosemide    Tablet 40 milliGRAM(s) Oral daily      MEDICATIONS  (PRN):      Physical Exam    Neuro :  no focal deficits  Respiratory: B/L basal crackles  CV: RRR, S1S2, no murmurs,   Abdominal: Soft, NT, ND +BS,  Extremities: b/l le edema, + peripheral pulses    ASSESSMENT    Acute on chronic systolic congestive heart failure  afib  r/o acs  h/o Type 2 diabetes mellitus without complication, without long-term current use of insulin  Acute on chronic systolic congestive heart failure  Peripheral Vascular Disease  Mitral Regurgitation  Cardiomyopathy  History of Hypertension  PVD (Peripheral Vascular Disease)  HTN (Hypertension)  Artificial pacemaker        PLAN      pt with poor prognosis  pt deterioriating  end stage chf  cardio f/u noted89 y/o Swiss speaking F AAO x 3 from home lives with daughter ambulate with cane at baseline PMH of severe systolic CHF (s/p BiV AICD 2010 EF 25% with G3 DD), DM (diet controlled) PVD and HTN. Pt presented to ED for progressive bilateral leg swelling, increased dyspnea on exertion, orthopnea over past 3 weeks as per daughter. Patient stated that she always has this SOB at baseline and can not ambulate much due to SOB. She has to sleep with 2-3 pillows at night and can not lie flat at all. Patient has multiple admission before with similar complaint and DDx with CHF exacerbation, previous admission in June 2017.  Pt denies fevers, chills, productive cough, abd pain, chest pain, nausea, vomiting, diarrhea, urinary symptoms or any other complaints.  pulm f/u noted  hospice/ paliative eval noted  pt qualifies for home hospice  soc wk eval for choices of hospice care agencies  cont current meds  d/c plan for home with home care svcs pending additional hrs and hosp f/u out pt

## 2017-08-02 NOTE — PROGRESS NOTE ADULT - PROBLEM SELECTOR PLAN 1
s/p Lasix 40mg IV push x 2 doses.   Repeat XR in AM. Patient was on Lasix 20mg BID at home.   Continue with IV Lasix 40mg daily for now. Intake/output monitor.   Fluid restriction to 1L. Coreg home dose.   Cardio Dr Shaw. F/u Doppler to r/o DVT for BL leg swelling.  R/o ACS protocol s/p Lasix 40mg IV push x 2 doses.    Swithced to PO lasix   Fluid restriction to 1L. Coreg home dose.   Cardio Dr Shaw. F/u Doppler to r/o DVT for BL leg swelling.  R/o ACS protocol

## 2017-08-02 NOTE — PROGRESS NOTE ADULT - SUBJECTIVE AND OBJECTIVE BOX
Patient is a 89y old  Female who presents with a chief complaint of SOB (01 Aug 2017 01:41)    88 y/o Hebrew speaking F AAO x 3 from home lives with daughter ambulate with cane at baseline, PMH of severe systolic CHF (s/p BiV AICD 2010 EF 25% with G3 DD), DM (diet controlled) PVD and HTN. Pt presented to ED for progressive bilateral leg swelling, increased dyspnea on exertion, orthopnea over past 3 weeks as per daughter. Patient stated that she always has this SOB at baseline and can not ambulate much due to SOB. She has to sleep with 2-3 pillows at night and can not lie flat at all. Patient has multiple admission before with similar complaint and DDx with CHF exacerbation, previous admission in June 2017.  Pt denies fevers, chills, abd pain, chest pain, nausea, vomiting, diarrhea, urinary symptoms or any other complaints. Has cough productive of clear phlegm and leg edema. Awake, responsive, complaining of feeling tired. Family requested hospice care    INTERVAL HPI/OVERNIGHT EVENTS:  The patient is awake, alert and comfortable in bed in NAD    VITAL SIGNS:  T(F): 98.5 (08-02-17 @ 08:12)  HR: 83 (08-02-17 @ 08:12)  BP: 127/68 (08-02-17 @ 08:12)  RR: 19 (08-02-17 @ 08:12)  SpO2: 100% (08-02-17 @ 08:12)  Wt(kg): --  I&O's Detail    01 Aug 2017 07:01  -  02 Aug 2017 07:00  --------------------------------------------------------  IN:  Total IN: 0 mL    OUT:    Voided: 300 mL  Total OUT: 300 mL    Total NET: -300 mL              REVIEW OF SYSTEMS:    CONSTITUTIONAL:  No fevers, chills, sweats    HEENT:  Eyes:  No diplopia or blurred vision. ENT:  No earache, sore throat or runny nose.    CARDIOVASCULAR:  No pressure, squeezing, tightness, or heaviness about the chest; no palpitations.    RESPIRATORY:  Per HPI    GASTROINTESTINAL:  No abdominal pain, nausea, vomiting or diarrhea.    GENITOURINARY:  No dysuria, frequency or urgency.    NEUROLOGIC:  No paresthesias, fasciculations, seizures or weakness.    PSYCHIATRIC:  No disorder of thought or mood.      PHYSICAL EXAM:    Constitutional: Well developed and nourished  Eyes:Perrla  ENMT: normal  Neck:supple  Respiratory: good air entry  Cardiovascular: S1 S2 regular  Gastrointestinal: Soft, Non tender  Extremities: No edema  Vascular:normal  Neurological:Awake, alert,Ox3  Musculoskeletal:Normal      MEDICATIONS  (STANDING):  aspirin  chewable 81 milliGRAM(s) Oral daily  insulin lispro (HumaLOG) corrective regimen sliding scale   SubCutaneous three times a day before meals  furosemide   Injectable 40 milliGRAM(s) IV Push daily  digoxin     Tablet 0.25 milliGRAM(s) Oral daily    MEDICATIONS  (PRN):      Allergies    No Known Allergies    Intolerances        LABS:                        16.3   14.4  )-----------( 142      ( 01 Aug 2017 03:00 )             50.2     07-31    134<L>  |  96  |  63<H>  ----------------------------<  123<H>  4.3   |  19<L>  |  2.83<H>    Ca    9.1      31 Jul 2017 12:35  Phos  5.7     07-31  Mg     2.2     07-31    TPro  7.3  /  Alb  2.8<L>  /  TBili  3.4<H>  /  DBili  x   /  AST  122<H>  /  ALT  60  /  AlkPhos  125<H>  07-31    PT/INR - ( 31 Jul 2017 12:35 )   PT: 28.7 sec;   INR: 2.58 ratio         PTT - ( 01 Aug 2017 04:51 )  PTT:>200.0 sec          CAPILLARY BLOOD GLUCOSE  84 (02 Aug 2017 08:12)  86 (01 Aug 2017 20:57)  90 (01 Aug 2017 16:17)  86 (01 Aug 2017 12:31)        pro-bnp [id number] 07-29 @ 19:08     d-dimer --  07-29 @ 19:08      RADIOLOGY & ADDITIONAL TESTS:    CXR:  < from: Xray Chest 1 View AP -PORTABLE-Routine (07.31.17 @ 09:13) >  EXAM:  CHEST PORTABLE ROUTINE                            PROCEDURE DATE:  07/31/2017      INTERPRETATION:  PORTABLE CHEST X-RAY  HISTORY: pul congestion.   COMPARISON: 7/29/2017.  FINDINGS/  IMPRESSION:  Mild diffuse interstitial prominence has improved since the prior study.   There is bibasilar subsegmental atelectasis and a trace pleural effusion.   No pneumothorax.  The cardiac silhouette is not accurately assessed by AP technique.   Left-sided triple lead ICD is reidentified; it perihepatic obscures the   left lower hemithorax.    < end of copied text >    Ct scan chest:    ekg;  < from: 12 Lead ECG (07.30.17 @ 09:19) >  Ventricular Rate 122 BPM    Atrial Rate 120 BPM    QRS Duration 164 ms     ms    QTc 573 ms    R Axis -47 degrees    T Axis 134 degrees    Diagnosis Line Atrial fibrillation with rapid ventricular response  Left axis deviation  Left bundle branch block  Abnormal ECG  Confirmed by CLOVER KAY, CASSI ([id number]) on 31-Jul-2017 08:39:34    < end of copied text >    echo: Patient is a 89y old  Female who presents with a chief complaint of SOB (01 Aug 2017 01:41)    88 y/o Romansh speaking F AAO x 3 from home lives with daughter ambulate with cane at baseline, PMH of severe systolic CHF (s/p BiV AICD 2010 EF 25% with G3 DD), DM (diet controlled) PVD and HTN. Pt presented to ED for progressive bilateral leg swelling, increased dyspnea on exertion, orthopnea over past 3 weeks as per daughter. Patient stated that she always has this SOB at baseline and can not ambulate much due to SOB. She has to sleep with 2-3 pillows at night and can not lie flat at all. Patient has multiple admission before with similar complaint and DDx with CHF exacerbation, previous admission in June 2017.  Pt denies fevers, chills, abd pain, chest pain, nausea, vomiting, diarrhea, urinary symptoms or any other complaints. Has cough productive of clear phlegm and leg edema. Awake, responsive, complaining of feeling tired. Family requested hospice care    INTERVAL HPI/OVERNIGHT EVENTS:  The patient is awake, alert and comfortable in bed in NAD    VITAL SIGNS:  T(F): 98.5 (08-02-17 @ 08:12)  HR: 83 (08-02-17 @ 08:12)  BP: 127/68 (08-02-17 @ 08:12)  RR: 19 (08-02-17 @ 08:12)  SpO2: 100% (08-02-17 @ 08:12)  Wt(kg): --  I&O's Detail    01 Aug 2017 07:01  -  02 Aug 2017 07:00  --------------------------------------------------------  IN:  Total IN: 0 mL    OUT:    Voided: 300 mL  Total OUT: 300 mL    Total NET: -300 mL              REVIEW OF SYSTEMS:    CONSTITUTIONAL:  No fevers, chills, sweats    HEENT:  Eyes:  No diplopia or blurred vision. ENT:  No earache, sore throat or runny nose.    CARDIOVASCULAR:  No pressure, squeezing, tightness, or heaviness about the chest; no palpitations.    RESPIRATORY:  Per HPI    GASTROINTESTINAL:  No abdominal pain, nausea, vomiting or diarrhea.    GENITOURINARY:  No dysuria, frequency or urgency.    NEUROLOGIC:  No paresthesias, fasciculations, seizures or weakness.    PSYCHIATRIC:  No disorder of thought or mood.      PHYSICAL EXAM:    Constitutional: Well developed and nourished  Eyes:Perrla  ENMT: normal  Neck:supple  Respiratory: rales posteriorly  Cardiovascular: S1 S2 regular  Gastrointestinal: Soft, Non tender  Extremities: + edema of legs  Vascular:normal  Neurological:Awake, alert,Ox3  Musculoskeletal:Normal      MEDICATIONS  (STANDING):  aspirin  chewable 81 milliGRAM(s) Oral daily  insulin lispro (HumaLOG) corrective regimen sliding scale   SubCutaneous three times a day before meals  furosemide   Injectable 40 milliGRAM(s) IV Push daily  digoxin     Tablet 0.25 milliGRAM(s) Oral daily    MEDICATIONS  (PRN):      Allergies    No Known Allergies    Intolerances        LABS:                        16.3   14.4  )-----------( 142      ( 01 Aug 2017 03:00 )             50.2     07-31    134<L>  |  96  |  63<H>  ----------------------------<  123<H>  4.3   |  19<L>  |  2.83<H>    Ca    9.1      31 Jul 2017 12:35  Phos  5.7     07-31  Mg     2.2     07-31    TPro  7.3  /  Alb  2.8<L>  /  TBili  3.4<H>  /  DBili  x   /  AST  122<H>  /  ALT  60  /  AlkPhos  125<H>  07-31    PT/INR - ( 31 Jul 2017 12:35 )   PT: 28.7 sec;   INR: 2.58 ratio         PTT - ( 01 Aug 2017 04:51 )  PTT:>200.0 sec          CAPILLARY BLOOD GLUCOSE  84 (02 Aug 2017 08:12)  86 (01 Aug 2017 20:57)  90 (01 Aug 2017 16:17)  86 (01 Aug 2017 12:31)        pro-bnp [id number] 07-29 @ 19:08     d-dimer --  07-29 @ 19:08      RADIOLOGY & ADDITIONAL TESTS:    CXR:  < from: Xray Chest 1 View AP -PORTABLE-Routine (07.31.17 @ 09:13) >  EXAM:  CHEST PORTABLE ROUTINE                            PROCEDURE DATE:  07/31/2017      INTERPRETATION:  PORTABLE CHEST X-RAY  HISTORY: pul congestion.   COMPARISON: 7/29/2017.  FINDINGS/  IMPRESSION:  Mild diffuse interstitial prominence has improved since the prior study.   There is bibasilar subsegmental atelectasis and a trace pleural effusion.   No pneumothorax.  The cardiac silhouette is not accurately assessed by AP technique.   Left-sided triple lead ICD is reidentified; it perihepatic obscures the   left lower hemithorax.    < end of copied text >    Ct scan chest:    ekg;  < from: 12 Lead ECG (07.30.17 @ 09:19) >  Ventricular Rate 122 BPM    Atrial Rate 120 BPM    QRS Duration 164 ms     ms    QTc 573 ms    R Axis -47 degrees    T Axis 134 degrees    Diagnosis Line Atrial fibrillation with rapid ventricular response  Left axis deviation  Left bundle branch block  Abnormal ECG  Confirmed by CLOVER KAY, CASSI ([id number]) on 31-Jul-2017 08:39:34    < end of copied text >    echo:

## 2017-08-03 VITALS
TEMPERATURE: 97 F | RESPIRATION RATE: 16 BRPM | OXYGEN SATURATION: 100 % | HEART RATE: 80 BPM | DIASTOLIC BLOOD PRESSURE: 61 MMHG | SYSTOLIC BLOOD PRESSURE: 126 MMHG

## 2017-08-03 LAB
HCT VFR BLD CALC: 51.8 % — HIGH (ref 34.5–45)
HGB BLD-MCNC: 16 G/DL — HIGH (ref 11.5–15.5)
MCHC RBC-ENTMCNC: 31 GM/DL — LOW (ref 32–36)
MCHC RBC-ENTMCNC: 32.8 PG — SIGNIFICANT CHANGE UP (ref 27–34)
MCV RBC AUTO: 106.1 FL — HIGH (ref 80–100)
PLATELET # BLD AUTO: 86 K/UL — LOW (ref 150–400)
RBC # BLD: 4.89 M/UL — SIGNIFICANT CHANGE UP (ref 3.8–5.2)
RBC # FLD: 15 % — HIGH (ref 10.3–14.5)
WBC # BLD: 6.7 K/UL — SIGNIFICANT CHANGE UP (ref 3.8–10.5)
WBC # FLD AUTO: 6.7 K/UL — SIGNIFICANT CHANGE UP (ref 3.8–10.5)

## 2017-08-03 PROCEDURE — 80048 BASIC METABOLIC PNL TOTAL CA: CPT

## 2017-08-03 PROCEDURE — 84133 ASSAY OF URINE POTASSIUM: CPT

## 2017-08-03 PROCEDURE — 82607 VITAMIN B-12: CPT

## 2017-08-03 PROCEDURE — 71045 X-RAY EXAM CHEST 1 VIEW: CPT

## 2017-08-03 PROCEDURE — 82668 ASSAY OF ERYTHROPOIETIN: CPT

## 2017-08-03 PROCEDURE — 82553 CREATINE MB FRACTION: CPT

## 2017-08-03 PROCEDURE — 83735 ASSAY OF MAGNESIUM: CPT

## 2017-08-03 PROCEDURE — 85610 PROTHROMBIN TIME: CPT

## 2017-08-03 PROCEDURE — 80061 LIPID PANEL: CPT

## 2017-08-03 PROCEDURE — 99285 EMERGENCY DEPT VISIT HI MDM: CPT | Mod: 25

## 2017-08-03 PROCEDURE — 85027 COMPLETE CBC AUTOMATED: CPT

## 2017-08-03 PROCEDURE — 82436 ASSAY OF URINE CHLORIDE: CPT

## 2017-08-03 PROCEDURE — 83036 HEMOGLOBIN GLYCOSYLATED A1C: CPT

## 2017-08-03 PROCEDURE — 93970 EXTREMITY STUDY: CPT

## 2017-08-03 PROCEDURE — 93005 ELECTROCARDIOGRAM TRACING: CPT

## 2017-08-03 PROCEDURE — 83880 ASSAY OF NATRIURETIC PEPTIDE: CPT

## 2017-08-03 PROCEDURE — 84300 ASSAY OF URINE SODIUM: CPT

## 2017-08-03 PROCEDURE — 36415 COLL VENOUS BLD VENIPUNCTURE: CPT

## 2017-08-03 PROCEDURE — 80053 COMPREHEN METABOLIC PANEL: CPT

## 2017-08-03 PROCEDURE — 84105 ASSAY OF URINE PHOSPHORUS: CPT

## 2017-08-03 PROCEDURE — 82570 ASSAY OF URINE CREATININE: CPT

## 2017-08-03 PROCEDURE — 84100 ASSAY OF PHOSPHORUS: CPT

## 2017-08-03 PROCEDURE — 84484 ASSAY OF TROPONIN QUANT: CPT

## 2017-08-03 PROCEDURE — 82550 ASSAY OF CK (CPK): CPT

## 2017-08-03 PROCEDURE — 83935 ASSAY OF URINE OSMOLALITY: CPT

## 2017-08-03 PROCEDURE — 85730 THROMBOPLASTIN TIME PARTIAL: CPT

## 2017-08-03 RX ORDER — ASPIRIN/CALCIUM CARB/MAGNESIUM 324 MG
1 TABLET ORAL
Qty: 15 | Refills: 0 | OUTPATIENT
Start: 2017-08-03 | End: 2017-08-18

## 2017-08-03 RX ORDER — FUROSEMIDE 40 MG
1 TABLET ORAL
Qty: 30 | Refills: 0 | OUTPATIENT
Start: 2017-08-03 | End: 2017-08-18

## 2017-08-03 RX ADMIN — Medication 0.25 MILLIGRAM(S): at 06:17

## 2017-08-03 NOTE — PROGRESS NOTE ADULT - PROBLEM SELECTOR PROBLEM 6
Type 2 diabetes mellitus without complication, without long-term current use of insulin

## 2017-08-03 NOTE — ADVANCED PRACTICE NURSE CONSULT - ASSESSMENT
This is a 89yr old female patient admitted for CHF, presenting with a Linear Tear to the Gluteal Fold (2cm x 0.3cm) with pink tissue, scant drainage, and surrounding tissue maceration present.

## 2017-08-03 NOTE — PROGRESS NOTE ADULT - PROBLEM SELECTOR PLAN 1
-EF 20%  -C/W Lasix and Digoxin  -F/U Digoxin level  -C/W Coreg as permitted by BP  holding coreg now as BP < 90 -EF 20%  -C/W Lasix and Digoxin  -C/W Coreg as permitted by BP

## 2017-08-03 NOTE — PROGRESS NOTE ADULT - PROBLEM SELECTOR PROBLEM 1
Acute on chronic systolic congestive heart failure

## 2017-08-03 NOTE — PROGRESS NOTE ADULT - SUBJECTIVE AND OBJECTIVE BOX
PGY 1 Note discussed with supervising resident and primary attending    Patient is a 89y old  Female who presents with a chief complaint of SOB (01 Aug 2017 01:41)      INTERVAL HPI/OVERNIGHT EVENTS: offers no new complaints; current symptoms resolving    MEDICATIONS  (STANDING):  aspirin  chewable 81 milliGRAM(s) Oral daily  insulin lispro (HumaLOG) corrective regimen sliding scale   SubCutaneous three times a day before meals  digoxin     Tablet 0.25 milliGRAM(s) Oral daily  furosemide    Tablet 40 milliGRAM(s) Oral daily    MEDICATIONS  (PRN):      __________________________________________________  REVIEW OF SYSTEMS:    CONSTITUTIONAL: No fever,   RESPIRATORY: productive cough; mild dyspnea   CARDIOVASCULAR: No chest pain, no palpitations  GASTROINTESTINAL: No pain. No nausea or vomiting; No diarrhea   MUSCULOSKELETAL: No joint pain, no muscle pain  GENITOURINARY: no dysuria, no frequency, no hesitancy  ALL OTHER  ROS negative        Vital Signs Last 24 Hrs  T(C): 36.2 (03 Aug 2017 07:45), Max: 36.6 (02 Aug 2017 15:19)  T(F): 97.2 (03 Aug 2017 07:45), Max: 97.9 (02 Aug 2017 15:19)  HR: 80 (03 Aug 2017 07:45) (76 - 87)  BP: 109/61 (03 Aug 2017 07:45) (100/58 - 119/72)  BP(mean): --  RR: 16 (03 Aug 2017 07:45) (16 - 19)  SpO2: 95% (03 Aug 2017 07:45) (94% - 98%)    ________________________________________________  PHYSICAL EXAM:  GENERAL: NAD, lying in bed   HEENT: Normocephalic;  conjunctivae and sclerae clear; moist mucous membranes;   NECK : supple, trachea midline, no JVD  CHEST/LUNG: Clear to auscultation bilaterally with good air entry   HEART: S1 S2  regular; no murmurs, gallops or rubs  ABDOMEN: Soft, Nontender, Nondistended; Bowel sounds present  EXTREMITIES: no cyanosis; no edema; no calf tenderness  SKIN: warm and dry; no rash    _________________________________________________  LABS:                        16.0   6.7   )-----------( 86       ( 03 Aug 2017 06:07 )             51.8               CAPILLARY BLOOD GLUCOSE  90 (03 Aug 2017 08:26)  95 (02 Aug 2017 20:59)  93 (02 Aug 2017 16:20)  84 (02 Aug 2017 12:20)          RADIOLOGY & ADDITIONAL TESTS:    Imaging Personally Reviewed:  YES/NO    Consultant(s) Notes Reviewed:   YES/ NO    Care Discussed with Consultants:     Plan of care was discussed with patient and/or primary care giver; all questions and concerns were addressed and care was aligned with patient's wishes. PGY 1 Note discussed with supervising resident and primary attending    Patient is a 89y old  Female who presents with a chief complaint of SOB (01 Aug 2017 01:41)      INTERVAL HPI/OVERNIGHT EVENTS: offers no new complaints; current symptoms resolving    MEDICATIONS  (STANDING):  aspirin  chewable 81 milliGRAM(s) Oral daily  insulin lispro (HumaLOG) corrective regimen sliding scale   SubCutaneous three times a day before meals  digoxin     Tablet 0.25 milliGRAM(s) Oral daily  furosemide    Tablet 40 milliGRAM(s) Oral daily    MEDICATIONS  (PRN):      __________________________________________________  REVIEW OF SYSTEMS:    CONSTITUTIONAL: No fever,   RESPIRATORY: productive cough; mild dyspnea   CARDIOVASCULAR: No chest pain, no palpitations  GASTROINTESTINAL: No pain. No nausea or vomiting; No diarrhea   MUSCULOSKELETAL: No joint pain, no muscle pain  GENITOURINARY: no dysuria, no frequency, no hesitancy  ALL OTHER  ROS negative        Vital Signs Last 24 Hrs  T(C): 36.2 (03 Aug 2017 07:45), Max: 36.6 (02 Aug 2017 15:19)  T(F): 97.2 (03 Aug 2017 07:45), Max: 97.9 (02 Aug 2017 15:19)  HR: 80 (03 Aug 2017 07:45) (76 - 87)  BP: 109/61 (03 Aug 2017 07:45) (100/58 - 119/72)  BP(mean): --  RR: 16 (03 Aug 2017 07:45) (16 - 19)  SpO2: 95% (03 Aug 2017 07:45) (94% - 98%)    ________________________________________________  PHYSICAL EXAM:  GENERAL: NAD, lying in bed   HEENT: Normocephalic;  conjunctivae and sclerae clear; moist mucous membranes;   NECK : supple, trachea midline, no JVD  CHEST/LUNG: Clear to auscultation bilaterally with good air entry   HEART: S1 S2  regular; no murmurs, gallops or rubs  ABDOMEN: Soft, Nontender, Nondistended; decreased bowel sounds noted  EXTREMITIES: no cyanosis; no edema; no calf tenderness  SKIN: warm and dry; no rash    _________________________________________________  LABS:                        16.0   6.7   )-----------( 86       ( 03 Aug 2017 06:07 )             51.8               CAPILLARY BLOOD GLUCOSE  90 (03 Aug 2017 08:26)  95 (02 Aug 2017 20:59)  93 (02 Aug 2017 16:20)  84 (02 Aug 2017 12:20)          RADIOLOGY & ADDITIONAL TESTS:    Imaging Personally Reviewed:  YES/NO    Consultant(s) Notes Reviewed:   YES/ NO    Care Discussed with Consultants:     Plan of care was discussed with patient and/or primary care giver; all questions and concerns were addressed and care was aligned with patient's wishes.

## 2017-08-03 NOTE — PROGRESS NOTE ADULT - PROBLEM SELECTOR PROBLEM 3
Atrial fibrillation with RVR
Palliative care encounter
Palliative care encounter

## 2017-08-03 NOTE — PROGRESS NOTE ADULT - PROBLEM SELECTOR PLAN 1
s/p Lasix 40mg IV push x 2 doses.    Swithced to PO lasix   Fluid restriction to 1L. Coreg home dose.   Cardio Dr Shaw. F/u Doppler to r/o DVT for BL leg swelling.  R/o ACS protocol

## 2017-08-03 NOTE — PROGRESS NOTE ADULT - PROBLEM SELECTOR PLAN 5
Continue meds as ordered   Cardio follow-up

## 2017-08-03 NOTE — PROGRESS NOTE ADULT - SUBJECTIVE AND OBJECTIVE BOX
Patient is a 89y old  Female who presents with a chief complaint of SOB (01 Aug 2017 01:41)    89 y/o Serbian speaking F AAO x 3 from home lives with daughter ambulate with cane at baseline, PMH of severe systolic CHF (s/p BiV AICD 2010 EF 25% with G3 DD), DM (diet controlled) PVD and HTN. Pt presented to ED for progressive bilateral leg swelling, increased dyspnea on exertion, orthopnea over past 3 weeks as per daughter. Patient stated that she always has this SOB at baseline and can not ambulate much due to SOB. She has to sleep with 2-3 pillows at night and can not lie flat at all. Patient has multiple admission before with similar complaint and DDx with CHF exacerbation, previous admission in June 2017.  Pt denies fevers, chills, abd pain, chest pain, nausea, vomiting, diarrhea, urinary symptoms or any other complaints. Has cough productive of clear phlegm and leg edema. Awake, responsive, complaining of feeling tired. Family requested hospice care    INTERVAL HPI/OVERNIGHT EVENTS:      VITAL SIGNS:  T(F): 97.2 (08-03-17 @ 07:45)  HR: 80 (08-03-17 @ 07:45)  BP: 109/61 (08-03-17 @ 07:45)  RR: 16 (08-03-17 @ 07:45)  SpO2: 95% (08-03-17 @ 07:45)  Wt(kg): --  I&O's Detail          REVIEW OF SYSTEMS:    CONSTITUTIONAL:  No fevers, chills, sweats    HEENT:  Eyes:  No diplopia or blurred vision. ENT:  No earache, sore throat or runny nose.    CARDIOVASCULAR:  No pressure, squeezing, tightness, or heaviness about the chest; no palpitations.    RESPIRATORY:  Per HPI    GASTROINTESTINAL:  No abdominal pain, nausea, vomiting or diarrhea.    GENITOURINARY:  No dysuria, frequency or urgency.    NEUROLOGIC:  No paresthesias, fasciculations, seizures or weakness.    PSYCHIATRIC:  No disorder of thought or mood.      PHYSICAL EXAM:    Constitutional: Well developed and nourished  Eyes:Perrla  ENMT: normal  Neck:supple  Respiratory: good air entry  Cardiovascular: S1 S2 regular  Gastrointestinal: Soft, Non tender  Extremities: No edema  Vascular:normal  Neurological:Awake, alert,Ox3  Musculoskeletal:Normal      MEDICATIONS  (STANDING):  aspirin  chewable 81 milliGRAM(s) Oral daily  insulin lispro (HumaLOG) corrective regimen sliding scale   SubCutaneous three times a day before meals  digoxin     Tablet 0.25 milliGRAM(s) Oral daily  furosemide    Tablet 40 milliGRAM(s) Oral daily    MEDICATIONS  (PRN):      Allergies    No Known Allergies    Intolerances        LABS:                        16.0   6.7   )-----------( 86       ( 03 Aug 2017 06:07 )             51.8                     CAPILLARY BLOOD GLUCOSE  90 (03 Aug 2017 08:26)  95 (02 Aug 2017 20:59)  93 (02 Aug 2017 16:20)  84 (02 Aug 2017 12:20)        pro-bnp [id number] 07-29 @ 19:08     d-dimer --  07-29 @ 19:08      RADIOLOGY & ADDITIONAL TESTS:     Xray Chest 1 View AP -PORTABLE-Routine (07.31.17 @ 09:13) >  IMPRESSION:  Mild diffuse interstitial prominence has improved since the prior study.   There is bibasilar subsegmental atelectasis and a trace pleural effusion.   No pneumothorax.    The cardiac silhouette is not accurately assessed by AP technique.   Left-sided triple lead ICD is reidentified; it perihepatic obscures the   left lower hemithorax.      US Duplex Venous Lower Ext Complete, Bilateral (07.31.17 @ 16:08) >  Impression: No evidence for deep vein thrombosis.        12 Lead ECG (07.30.17 @ 09:19) >  Diagnosis Line Atrial fibrillation with rapid ventricular response  Left axis deviation  Left bundle branch block  Abnormal ECG Patient is a 89y old  Female who presents with a chief complaint of SOB (01 Aug 2017 01:41)    89 y/o Georgian speaking F AAO x 3 from home lives with daughter ambulate with cane at baseline, PMH of severe systolic CHF (s/p BiV AICD 2010 EF 25% with G3 DD), DM (diet controlled) PVD and HTN. Pt presented to ED for progressive bilateral leg swelling, increased dyspnea on exertion, orthopnea over past 3 weeks as per daughter. Patient stated that she always has this SOB at baseline and can not ambulate much due to SOB. She has to sleep with 2-3 pillows at night and can not lie flat at all. Patient has multiple admission before with similar complaint and DDx with CHF exacerbation, previous admission in June 2017.  Pt denies fevers, chills, abd pain, chest pain, nausea, vomiting, diarrhea, urinary symptoms or any other complaints. Has cough productive of clear phlegm and leg edema. Awake, responsive, complaining of feeling tired. Family requested hospice care    INTERVAL HPI/OVERNIGHT EVENTS: Lethargic, arousable, OOB to chair in NAD.      VITAL SIGNS:  T(F): 97.2 (08-03-17 @ 07:45)  HR: 80 (08-03-17 @ 07:45)  BP: 109/61 (08-03-17 @ 07:45)  RR: 16 (08-03-17 @ 07:45)  SpO2: 95% (08-03-17 @ 07:45)  Wt(kg): --  I&O's Detail          REVIEW OF SYSTEMS:    CONSTITUTIONAL:  No fevers, chills, sweats    HEENT:  Eyes:  No diplopia or blurred vision. ENT:  No earache, sore throat or runny nose.    CARDIOVASCULAR:  No pressure, squeezing, tightness, or heaviness about the chest; no palpitations.    RESPIRATORY:  Per HPI    GASTROINTESTINAL:  No abdominal pain, nausea, vomiting or diarrhea.    GENITOURINARY:  No dysuria, frequency or urgency.    NEUROLOGIC:  No paresthesias, fasciculations, seizures or weakness.    PSYCHIATRIC:  No disorder of thought or mood.      PHYSICAL EXAM:    Constitutional: Well developed and nourished  Eyes:Perrla  ENMT: normal  Neck:supple  Respiratory: Rales Bilaterally  Cardiovascular: S1 S2 regular  Gastrointestinal: Soft, Non tender  Extremities: No edema  Vascular:normal  Neurological:Awake, alert,Ox3  Musculoskeletal:Normal      MEDICATIONS  (STANDING):  aspirin  chewable 81 milliGRAM(s) Oral daily  insulin lispro (HumaLOG) corrective regimen sliding scale   SubCutaneous three times a day before meals  digoxin     Tablet 0.25 milliGRAM(s) Oral daily  furosemide    Tablet 40 milliGRAM(s) Oral daily    MEDICATIONS  (PRN):      Allergies    No Known Allergies    Intolerances        LABS:                        16.0   6.7   )-----------( 86       ( 03 Aug 2017 06:07 )             51.8                     CAPILLARY BLOOD GLUCOSE  90 (03 Aug 2017 08:26)  95 (02 Aug 2017 20:59)  93 (02 Aug 2017 16:20)  84 (02 Aug 2017 12:20)        pro-bnp [id number] 07-29 @ 19:08     d-dimer --  07-29 @ 19:08      RADIOLOGY & ADDITIONAL TESTS:     Xray Chest 1 View AP -PORTABLE-Routine (07.31.17 @ 09:13) >  IMPRESSION:  Mild diffuse interstitial prominence has improved since the prior study.   There is bibasilar subsegmental atelectasis and a trace pleural effusion.   No pneumothorax.    The cardiac silhouette is not accurately assessed by AP technique.   Left-sided triple lead ICD is reidentified; it perihepatic obscures the   left lower hemithorax.      US Duplex Venous Lower Ext Complete, Bilateral (07.31.17 @ 16:08) >  Impression: No evidence for deep vein thrombosis.        12 Lead ECG (07.30.17 @ 09:19) >  Diagnosis Line Atrial fibrillation with rapid ventricular response  Left axis deviation  Left bundle branch block  Abnormal ECG

## 2017-08-03 NOTE — ADVANCED PRACTICE NURSE CONSULT - RECOMMEDATIONS
-Clean the Gluteal Fold wound with normal saline and apply skin prep to the surrounding skin  -Apply Zinc Oxide Moisture Barrier Cream to the wound bed and cover with a Foam dressing b.i.d. PRN  -Elevate/float the patients heels using heel protectors and reposition the patient Q 2hrs using wedges or pillows; while in bed

## 2017-08-03 NOTE — PROGRESS NOTE ADULT - ASSESSMENT
88 y/o Sierra Leonean speaking F AAO x 3 admitted with congestive heart failure and Afib not on any anti coagulation, goals of care discussed  with HCP pt is DNR/DNI   palliative care Dr Yu has seen the patient family agree for home hospice comfort care.

## 2017-08-03 NOTE — PROGRESS NOTE ADULT - PROBLEM SELECTOR PLAN 3
EKG with Afib with RVR and widened QRS complex.   no history of Afib on previous admission and unknown to patient.   On coreg and started on Heparin drip. JULIO CESAR VASC score 7. Cont meds  Cardiology follow up

## 2017-08-03 NOTE — PROGRESS NOTE ADULT - PROBLEM SELECTOR PLAN 6
Diet control  Accu-check and HSS.  Check HBA1c  Endocrine eval

## 2017-08-03 NOTE — PROGRESS NOTE ADULT - SUBJECTIVE AND OBJECTIVE BOX
Patient is a 89y old  Female who presents with a chief complaint of SOB (01 Aug 2017 01:41)    pt seen in icu [  ], reg med floor [ x  ], bed [  ], chair at bedside [  x ], a+o x3 [ x ], lethargic [  ],  nad [ x ]      Allergies    No Known Allergies        Vitals    T(F): 97.2 (08-03-17 @ 07:45), Max: 97.9 (08-02-17 @ 15:19)  HR: 80 (08-03-17 @ 07:45) (76 - 87)  BP: 109/61 (08-03-17 @ 07:45) (100/58 - 119/72)  RR: 16 (08-03-17 @ 07:45) (16 - 19)  SpO2: 95% (08-03-17 @ 07:45) (94% - 98%)  Wt(kg): --  CAPILLARY BLOOD GLUCOSE  103 (03 Aug 2017 12:02)          Labs                          16.0   6.7   )-----------( 86       ( 03 Aug 2017 06:07 )             51.8                         Radiology Results      Meds    MEDICATIONS  (STANDING):  aspirin  chewable 81 milliGRAM(s) Oral daily  insulin lispro (HumaLOG) corrective regimen sliding scale   SubCutaneous three times a day before meals  digoxin     Tablet 0.25 milliGRAM(s) Oral daily  furosemide    Tablet 40 milliGRAM(s) Oral daily      MEDICATIONS  (PRN):      Physical Exam      Neuro :  no focal deficits  Respiratory: B/L basal crackles  CV: RRR, S1S2, no murmurs,   Abdominal: Soft, NT, ND +BS,  Extremities: b/l le edema, + peripheral pulses    ASSESSMENT    Acute on chronic systolic congestive heart failure  afib  r/o acs  h/o Type 2 diabetes mellitus without complication, without long-term current use of insulin  Acute on chronic systolic congestive heart failure  Peripheral Vascular Disease  Mitral Regurgitation  Cardiomyopathy  History of Hypertension  PVD (Peripheral Vascular Disease)  HTN (Hypertension)  Artificial pacemaker        PLAN      pt with poor prognosis  pt deterioriating  end stage chf  cardio f/u noted89 y/o Faroese speaking F AAO x 3 from home lives with daughter ambulate with cane at baseline PMH of severe systolic CHF (s/p BiV AICD 2010 EF 25% with G3 DD), DM (diet controlled) PVD and HTN. Pt presented to ED for progressive bilateral leg swelling, increased dyspnea on exertion, orthopnea over past 3 weeks as per daughter. Patient stated that she always has this SOB at baseline and can not ambulate much due to SOB. She has to sleep with 2-3 pillows at night and can not lie flat at all. Patient has multiple admission before with similar complaint and DDx with CHF exacerbation, previous admission in June 2017.  Pt denies fevers, chills, productive cough, abd pain, chest pain, nausea, vomiting, diarrhea, urinary symptoms or any other complaints.  pulm f/u noted  hospice/ paliative eval noted  pt qualifies for home hospice  cont comfort care  cont current meds  d/c plan for home with home care svcs   family will get hosp f/u outpt

## 2017-08-08 DIAGNOSIS — Z66 DO NOT RESUSCITATE: ICD-10-CM

## 2017-08-08 DIAGNOSIS — R53.81 OTHER MALAISE: ICD-10-CM

## 2017-08-08 DIAGNOSIS — Z51.5 ENCOUNTER FOR PALLIATIVE CARE: ICD-10-CM

## 2017-08-08 DIAGNOSIS — N18.9 CHRONIC KIDNEY DISEASE, UNSPECIFIED: ICD-10-CM

## 2017-08-08 DIAGNOSIS — I42.9 CARDIOMYOPATHY, UNSPECIFIED: ICD-10-CM

## 2017-08-08 DIAGNOSIS — I48.91 UNSPECIFIED ATRIAL FIBRILLATION: ICD-10-CM

## 2017-08-08 DIAGNOSIS — E44.0 MODERATE PROTEIN-CALORIE MALNUTRITION: ICD-10-CM

## 2017-08-08 DIAGNOSIS — I73.9 PERIPHERAL VASCULAR DISEASE, UNSPECIFIED: ICD-10-CM

## 2017-08-08 DIAGNOSIS — Z95.0 PRESENCE OF CARDIAC PACEMAKER: ICD-10-CM

## 2017-08-08 DIAGNOSIS — I13.0 HYPERTENSIVE HEART AND CHRONIC KIDNEY DISEASE WITH HEART FAILURE AND STAGE 1 THROUGH STAGE 4 CHRONIC KIDNEY DISEASE, OR UNSPECIFIED CHRONIC KIDNEY DISEASE: ICD-10-CM

## 2017-08-08 DIAGNOSIS — I34.0 NONRHEUMATIC MITRAL (VALVE) INSUFFICIENCY: ICD-10-CM

## 2017-08-08 DIAGNOSIS — E11.22 TYPE 2 DIABETES MELLITUS WITH DIABETIC CHRONIC KIDNEY DISEASE: ICD-10-CM

## 2017-08-08 DIAGNOSIS — I50.23 ACUTE ON CHRONIC SYSTOLIC (CONGESTIVE) HEART FAILURE: ICD-10-CM

## 2017-08-11 ENCOUNTER — INPATIENT (INPATIENT)
Facility: HOSPITAL | Age: 82
LOS: 0 days | Discharge: HOSPICE | DRG: 377 | End: 2017-08-12
Attending: HOSPITALIST | Admitting: HOSPITALIST
Payer: MEDICARE

## 2017-08-11 VITALS
RESPIRATION RATE: 14 BRPM | TEMPERATURE: 98 F | HEIGHT: 61 IN | SYSTOLIC BLOOD PRESSURE: 114 MMHG | WEIGHT: 134.92 LBS | DIASTOLIC BLOOD PRESSURE: 78 MMHG | OXYGEN SATURATION: 91 % | HEART RATE: 94 BPM

## 2017-08-11 DIAGNOSIS — Z95.0 PRESENCE OF CARDIAC PACEMAKER: Chronic | ICD-10-CM

## 2017-08-11 RX ORDER — SODIUM CHLORIDE 9 MG/ML
3 INJECTION INTRAMUSCULAR; INTRAVENOUS; SUBCUTANEOUS ONCE
Qty: 0 | Refills: 0 | Status: COMPLETED | OUTPATIENT
Start: 2017-08-11 | End: 2017-08-11

## 2017-08-11 RX ADMIN — SODIUM CHLORIDE 3 MILLILITER(S): 9 INJECTION INTRAMUSCULAR; INTRAVENOUS; SUBCUTANEOUS at 21:58

## 2017-08-11 NOTE — ED ADULT TRIAGE NOTE - CHIEF COMPLAINT QUOTE
BIIA with c/o abdominal pain with nausea, vomiting and bloody stools. As per grandson patient dnr/dni

## 2017-08-12 ENCOUNTER — TRANSCRIPTION ENCOUNTER (OUTPATIENT)
Age: 82
End: 2017-08-12

## 2017-08-12 ENCOUNTER — INPATIENT (INPATIENT)
Facility: HOSPITAL | Age: 82
LOS: 2 days | DRG: 377 | End: 2017-08-15
Attending: INTERNAL MEDICINE | Admitting: INTERNAL MEDICINE
Payer: OTHER MISCELLANEOUS

## 2017-08-12 VITALS
SYSTOLIC BLOOD PRESSURE: 79 MMHG | OXYGEN SATURATION: 94 % | DIASTOLIC BLOOD PRESSURE: 47 MMHG | TEMPERATURE: 98 F | HEART RATE: 118 BPM | RESPIRATION RATE: 17 BRPM

## 2017-08-12 VITALS — WEIGHT: 134.92 LBS

## 2017-08-12 DIAGNOSIS — K92.1 MELENA: ICD-10-CM

## 2017-08-12 DIAGNOSIS — Z95.0 PRESENCE OF CARDIAC PACEMAKER: Chronic | ICD-10-CM

## 2017-08-12 DIAGNOSIS — I50.22 CHRONIC SYSTOLIC (CONGESTIVE) HEART FAILURE: ICD-10-CM

## 2017-08-12 DIAGNOSIS — Z95.810 PRESENCE OF AUTOMATIC (IMPLANTABLE) CARDIAC DEFIBRILLATOR: ICD-10-CM

## 2017-08-12 DIAGNOSIS — Z51.5 ENCOUNTER FOR PALLIATIVE CARE: ICD-10-CM

## 2017-08-12 DIAGNOSIS — I51.9 HEART DISEASE, UNSPECIFIED: ICD-10-CM

## 2017-08-12 DIAGNOSIS — R45.1 RESTLESSNESS AND AGITATION: ICD-10-CM

## 2017-08-12 DIAGNOSIS — R53.81 OTHER MALAISE: ICD-10-CM

## 2017-08-12 DIAGNOSIS — K92.2 GASTROINTESTINAL HEMORRHAGE, UNSPECIFIED: ICD-10-CM

## 2017-08-12 DIAGNOSIS — I50.23 ACUTE ON CHRONIC SYSTOLIC (CONGESTIVE) HEART FAILURE: ICD-10-CM

## 2017-08-12 LAB
ALBUMIN SERPL ELPH-MCNC: 2.4 G/DL — LOW (ref 3.5–5)
ALP SERPL-CCNC: 79 U/L — SIGNIFICANT CHANGE UP (ref 40–120)
ALT FLD-CCNC: 31 U/L DA — SIGNIFICANT CHANGE UP (ref 10–60)
ANION GAP SERPL CALC-SCNC: 13 MMOL/L — SIGNIFICANT CHANGE UP (ref 5–17)
APTT BLD: 30.1 SEC — SIGNIFICANT CHANGE UP (ref 27.5–37.4)
AST SERPL-CCNC: 80 U/L — HIGH (ref 10–40)
BASOPHILS # BLD AUTO: 0.1 K/UL — SIGNIFICANT CHANGE UP (ref 0–0.2)
BASOPHILS NFR BLD AUTO: 0.5 % — SIGNIFICANT CHANGE UP (ref 0–2)
BILIRUB SERPL-MCNC: 2.9 MG/DL — HIGH (ref 0.2–1.2)
BUN SERPL-MCNC: 61 MG/DL — HIGH (ref 7–18)
CALCIUM SERPL-MCNC: 7.9 MG/DL — LOW (ref 8.4–10.5)
CHLORIDE SERPL-SCNC: 92 MMOL/L — LOW (ref 96–108)
CO2 SERPL-SCNC: 27 MMOL/L — SIGNIFICANT CHANGE UP (ref 22–31)
CREAT SERPL-MCNC: 1.67 MG/DL — HIGH (ref 0.5–1.3)
EOSINOPHIL # BLD AUTO: 0 K/UL — SIGNIFICANT CHANGE UP (ref 0–0.5)
EOSINOPHIL NFR BLD AUTO: 0 % — SIGNIFICANT CHANGE UP (ref 0–6)
GLUCOSE SERPL-MCNC: 120 MG/DL — HIGH (ref 70–99)
HCT VFR BLD CALC: 23.5 % — LOW (ref 34.5–45)
HGB BLD-MCNC: 7.7 G/DL — LOW (ref 11.5–15.5)
INR BLD: 2.07 RATIO — HIGH (ref 0.88–1.16)
LACTATE SERPL-SCNC: 5.6 MMOL/L — CRITICAL HIGH (ref 0.7–2)
LIDOCAIN IGE QN: 36 U/L — LOW (ref 73–393)
LYMPHOCYTES # BLD AUTO: 0.7 K/UL — LOW (ref 1–3.3)
LYMPHOCYTES # BLD AUTO: 5.3 % — LOW (ref 13–44)
MCHC RBC-ENTMCNC: 32.8 GM/DL — SIGNIFICANT CHANGE UP (ref 32–36)
MCHC RBC-ENTMCNC: 34.2 PG — HIGH (ref 27–34)
MCV RBC AUTO: 104.3 FL — HIGH (ref 80–100)
MONOCYTES # BLD AUTO: 0.7 K/UL — SIGNIFICANT CHANGE UP (ref 0–0.9)
MONOCYTES NFR BLD AUTO: 5.7 % — SIGNIFICANT CHANGE UP (ref 2–14)
NEUTROPHILS # BLD AUTO: 11.5 K/UL — HIGH (ref 1.8–7.4)
NEUTROPHILS NFR BLD AUTO: 88.5 % — HIGH (ref 43–77)
PLATELET # BLD AUTO: 133 K/UL — LOW (ref 150–400)
POTASSIUM SERPL-MCNC: 4.2 MMOL/L — SIGNIFICANT CHANGE UP (ref 3.5–5.3)
POTASSIUM SERPL-SCNC: 4.2 MMOL/L — SIGNIFICANT CHANGE UP (ref 3.5–5.3)
PROT SERPL-MCNC: 6.2 G/DL — SIGNIFICANT CHANGE UP (ref 6–8.3)
PROTHROM AB SERPL-ACNC: 22.9 SEC — HIGH (ref 9.8–12.7)
RBC # BLD: 2.25 M/UL — LOW (ref 3.8–5.2)
RBC # FLD: 16.3 % — HIGH (ref 10.3–14.5)
SODIUM SERPL-SCNC: 132 MMOL/L — LOW (ref 135–145)
TROPONIN I SERPL-MCNC: 1.12 NG/ML — HIGH (ref 0–0.04)
WBC # BLD: 13 K/UL — HIGH (ref 3.8–10.5)
WBC # FLD AUTO: 13 K/UL — HIGH (ref 3.8–10.5)

## 2017-08-12 PROCEDURE — 99223 1ST HOSP IP/OBS HIGH 75: CPT

## 2017-08-12 PROCEDURE — 99285 EMERGENCY DEPT VISIT HI MDM: CPT | Mod: 25

## 2017-08-12 PROCEDURE — 85610 PROTHROMBIN TIME: CPT

## 2017-08-12 PROCEDURE — 36415 COLL VENOUS BLD VENIPUNCTURE: CPT

## 2017-08-12 PROCEDURE — 93005 ELECTROCARDIOGRAM TRACING: CPT

## 2017-08-12 PROCEDURE — 85730 THROMBOPLASTIN TIME PARTIAL: CPT

## 2017-08-12 PROCEDURE — 84484 ASSAY OF TROPONIN QUANT: CPT

## 2017-08-12 PROCEDURE — 83605 ASSAY OF LACTIC ACID: CPT

## 2017-08-12 PROCEDURE — 85027 COMPLETE CBC AUTOMATED: CPT

## 2017-08-12 PROCEDURE — 80053 COMPREHEN METABOLIC PANEL: CPT

## 2017-08-12 PROCEDURE — 83690 ASSAY OF LIPASE: CPT

## 2017-08-12 RX ORDER — MORPHINE SULFATE 50 MG/1
2 CAPSULE, EXTENDED RELEASE ORAL
Qty: 0 | Refills: 0 | Status: DISCONTINUED | OUTPATIENT
Start: 2017-08-12 | End: 2017-08-15

## 2017-08-12 RX ORDER — MORPHINE SULFATE 50 MG/1
2 CAPSULE, EXTENDED RELEASE ORAL
Qty: 0 | Refills: 0 | Status: DISCONTINUED | OUTPATIENT
Start: 2017-08-12 | End: 2017-08-12

## 2017-08-12 RX ORDER — ROBINUL 0.2 MG/ML
0.2 INJECTION INTRAMUSCULAR; INTRAVENOUS EVERY 4 HOURS
Qty: 0 | Refills: 0 | Status: DISCONTINUED | OUTPATIENT
Start: 2017-08-12 | End: 2017-08-12

## 2017-08-12 RX ORDER — ROBINUL 0.2 MG/ML
0.2 INJECTION INTRAMUSCULAR; INTRAVENOUS EVERY 4 HOURS
Qty: 0 | Refills: 0 | Status: DISCONTINUED | OUTPATIENT
Start: 2017-08-12 | End: 2017-08-14

## 2017-08-12 RX ORDER — SODIUM CHLORIDE 9 MG/ML
1000 INJECTION INTRAMUSCULAR; INTRAVENOUS; SUBCUTANEOUS
Qty: 0 | Refills: 0 | Status: DISCONTINUED | OUTPATIENT
Start: 2017-08-12 | End: 2017-08-12

## 2017-08-12 RX ORDER — ACETAMINOPHEN 500 MG
650 TABLET ORAL EVERY 6 HOURS
Qty: 0 | Refills: 0 | Status: DISCONTINUED | OUTPATIENT
Start: 2017-08-12 | End: 2017-08-15

## 2017-08-12 RX ADMIN — MORPHINE SULFATE 2 MILLIGRAM(S): 50 CAPSULE, EXTENDED RELEASE ORAL at 09:52

## 2017-08-12 RX ADMIN — MORPHINE SULFATE 2 MILLIGRAM(S): 50 CAPSULE, EXTENDED RELEASE ORAL at 09:53

## 2017-08-12 RX ADMIN — Medication 0.5 MILLIGRAM(S): at 09:57

## 2017-08-12 RX ADMIN — MORPHINE SULFATE 2 MILLIGRAM(S): 50 CAPSULE, EXTENDED RELEASE ORAL at 18:54

## 2017-08-12 RX ADMIN — MORPHINE SULFATE 2 MILLIGRAM(S): 50 CAPSULE, EXTENDED RELEASE ORAL at 18:52

## 2017-08-12 NOTE — H&P ADULT - PROBLEM SELECTOR PLAN 4
Consult palliative care attending  Started pt on ativan 0.5mg q4hr morphine 2mg q2hr for comfort care

## 2017-08-12 NOTE — H&P ADULT - PROBLEM SELECTOR PLAN 4
family agreed to turn off AICD. Need to still determine what company the AICD is from. Back up plan is finding a magnet to place over device when pt is in the dying process. If unable to turn off device or find a magnet, will give medications to help with pain if device goes off.

## 2017-08-12 NOTE — H&P ADULT - PROBLEM SELECTOR PLAN 1
Pt admitted for large 4 bowel movements of blood  Pt has poor prognosis and family doesn't want any interventions  D/C fluids   Low Hb of 7.7  No transfusion as per family.  Started pt on ativan 0.5mg q4hr morphine 2mg q2hr for comfort care Pt admitted for large 4 bowel movements of blood  Pt has poor prognosis and family doesn't want any interventions  D/C fluids   Low Hb of 7.7  consider IV protonix if family agreeable  No PRBC transfusion as per family.  Started pt on ativan 0.5mg q4hr morphine 2mg q2hr for comfort care

## 2017-08-12 NOTE — H&P ADULT - NSHPREVIEWOFSYSTEMS_GEN_ALL_CORE
mild confusion, moderate distress due to SOB, agitation, pain in her LEs and severe fatigue and weakness, loss of appetite and now bedbound

## 2017-08-12 NOTE — H&P ADULT - NSHPPHYSICALEXAM_GEN_ALL_CORE
moderate distress and agitated/restlessness  lungs - labored breathing likely due to agitation  abd soft and normal  musculoskeletal: Weakness x4, LE +2 pitting edema  GI/ - incontinent

## 2017-08-12 NOTE — CHART NOTE - NSCHARTNOTEFT_GEN_A_CORE
Spoke with ER doctor last night and again with Dr. Goode about pt.   Spoke with Grandson and Daughter both HCPs who want only comfort care measures on this pt - endstage CHR severe MR with dual chamber AICD/PPM who came in for lower GIB. They do not want transfusions, w/u or any treatments that will prolong her dying process. They requested inpt hospice. Spoke with [facility] admitting office and they will send a nurse to evaluate pt.     Discussions with family and Dr. Goode about turning off AICD - after much discussion, family agreed to turn off AICD as it will no longer benefit pt. If unable to get company to turn off AICD, will get a Magnet to place over AICD when pt is imminently dying    Support and contact info given.

## 2017-08-12 NOTE — ED PROVIDER NOTE - CHPI ED SYMPTOMS NEG
no shortness of breath, no cough, no chest pain, no palpitations/no nausea/no fever/no chills/no vomiting/no diarrhea

## 2017-08-12 NOTE — H&P ADULT - NSHPPHYSICALEXAM_GEN_ALL_CORE
Vital Signs Last 24 Hrs  T(C): 36.5 (12 Aug 2017 07:16), Max: 36.5 (12 Aug 2017 07:16)  T(F): 97.7 (12 Aug 2017 07:16), Max: 97.7 (12 Aug 2017 07:16)  HR: 74 (12 Aug 2017 07:16) (74 - 94)  BP: 95/49 (12 Aug 2017 07:16) (95/49 - 114/78)  BP(mean): --  RR: 15 (12 Aug 2017 07:16) (14 - 16)  SpO2: 100% (12 Aug 2017 07:16) (91% - 100%)

## 2017-08-12 NOTE — DISCHARGE NOTE ADULT - MEDICATION SUMMARY - MEDICATIONS TO STOP TAKING
I will STOP taking the medications listed below when I get home from the hospital:    furosemide 40 mg oral tablet  -- 1 tab(s) by mouth 2 times a day  -- Avoid prolonged or excessive exposure to direct and/or artificial sunlight while taking this medication.  It is very important that you take or use this exactly as directed.  Do not skip doses or discontinue unless directed by your doctor.  It may be advisable to drink a full glass orange juice or eat a banana daily while taking this medication.

## 2017-08-12 NOTE — H&P ADULT - HISTORY OF PRESENT ILLNESS
89 yo F pt with PMH of severe systolic heart failure, A-Fib, DM type 2, presented to the ED with bleeding per rectum . She was founc to have hb 7.7  down from prior hb of 16.  Per family. has had decreased appetite x 1 week and noted with  dark red bloody stool rectally x 4 days. As per family member, Patient was undergoing assessment for home hospice but due to her deteriorating clinical state, acute bleeding and progression of symptoms, patient was bought to the hospital.. They brought her to ED and do wish for comfort care. Upon talking to the health care proxy Mr Kinney and Miss Maravilla ([phone]) that Pt's cardiologist (Dr. Gomes) expressed during her last hospitalization that prognosis is poor.   Health care proxy doesn't want any aggressive interventions based on patient known wishes. They dont want  blood transfusions or NS transfusions for low BP and Hb. Pt is DNR DNI and they dont want any blood drawings for any daily monitoring.   In ED, her vitals showed low BP of 90/70.Her Labs showed low H/H of 7.7/23. No blood transfusion as per family.     Pt was found to be eligible for inpt hospice due to agitation, SOB from GIB and endstage heart failure

## 2017-08-12 NOTE — H&P ADULT - HISTORY OF PRESENT ILLNESS
89 y/o F pt with PMHx of severe systolic heart failure, A-Fib, DM type 2, under evaluation for palliative care and is BIB family members to the ED c/o decreased appetite x 1 week and rectal with dark red blood x 4 days. As per family member, pt's cardiologist (Dr. Gomes) stated that the pt's prognosis is poor and wanted to see options for comfort care. Pt was previously admitted and discharged in July 2017. Now, pt is currently under evaluation at home hospice. Family members denies fever, chills, nausea, vomiting, diarrhea, shortness of breath, cough, chest pain, palpitations, or any other complaints. NKDA. 89 yo F pt with PMH of severe systolic heart failure, A-Fib, DM type 2, under evaluation for palliative care and is BIB family members to the ED c/o decreased appetite x 1 week and rectal with dark red blood x 4 days. As per family member, Pt was deteriorating at home and they couldn't see her in this condition. They brought her to ED for comfort care. Upon talking to the health care proxy Mr Kinney and Miss Maravilla ([phone], [phone]) came to know that Pt's cardiologist (Dr. Gomes) stated that the pt's prognosis is poor and wanted to see options for comfort care. Health care proxy doesn't want any aggressive interventions with the patient. They dontPt was previously admitted and discharged in July 2017. Now, pt is currently under evaluation at home hospice. Family members denies fever, chills, nausea, vomiting, diarrhea, shortness of breath, cough, chest pain, palpitations, or any other complaints. NKDA. 89 yo F pt with PMH of severe systolic heart failure, A-Fib, DM type 2, under evaluation for palliative care and is BIB family members to the ED c/o decreased appetite x 1 week and rectal with dark red blood x 4 days. As per family member, Pt was deteriorating at home and they couldn't see her in this condition. They brought her to ED for comfort care. Upon talking to the health care proxy Mr Kinney and Miss Maravilla ([phone], [phone]) came to know that Pt's cardiologist (Dr. Gomes) stated that the pt's prognosis is poor and wanted to see options for comfort care. Health care proxy doesn't want any aggressive interventions with the patient. They dont Pt was previously admitted and discharged in July 2017. Now, pt is currently under evaluation at home hospice. Family members denies fever, chills, nausea, vomiting, diarrhea, shortness of breath, cough, chest pain, palpitations, or any other complaints. NKDA. 89 yo F pt with PMH of severe systolic heart failure, A-Fib, DM type 2, under evaluation for palliative care and is BIB family members to the ED c/o decreased appetite x 1 week and rectal with dark red blood x 4 days. As per family member, Pt was deteriorating at home and they couldn't see her in this condition. They brought her to ED for comfort care. Upon talking to the health care proxy Mr Kinney and Miss Maravilla ([phone], [phone]) came to know that Pt's cardiologist (Dr. Gomes) stated that the pt's prognosis is poor and wanted to see options for comfort care. Health care proxy doesn't want any aggressive interventions with the patient. They dont want any blood transfusions or NS transfusions for low BP and Hb. Pt is DNR DNI and they dont want any blood drawings for any monitoring. previously admitted and discharged in July 2017. Now, pt is currently under evaluation at home hospice. Family members denies fever, chills, nausea, vomiting, diarrhea, shortness of breath, cough, chest pain, palpitations, or any other complaints. NKDA. 88 yo F pt with PMH of severe systolic heart failure, A-Fib, DM type 2, under evaluation for palliative care and is BIB family members to the ED c/o decreased appetite x 1 week and rectal with dark red blood x 4 days. As per family member, Pt was deteriorating at home and they couldn't see her in this condition. They brought her to ED for comfort care. Upon talking to the health care proxy Mr Kinney and Miss Maravilla ([phone], [phone]) came to know that Pt's cardiologist (Dr. Gomes) stated that the pt's prognosis is poor and wanted to see options for comfort care. Health care proxy doesn't want any aggressive interventions with the patient. They dont want any blood transfusions or NS transfusions for low BP and Hb. Pt is DNR DNI and they dont want any blood drawings for any daily monitoring.     Patient was admitted with Acute CHF exacerbation recently and discharged on June 23, 2017. Previous echo with global systolic dysfunction and CXR with bilateral congestion. She was treated with lasix. For Afib with RVR, Dr Gomes followed the patient, started pt on Digoxin 0.25 mg. Pt was not a good candidate for anticoagulation. Palliative care was consulted Goals of care discussed with her daughter/HCP, Renita. Daughter acknowledges patient's poor prognosis. Discussed goals of care and advance directives - requests patient to return home and be comfortable. Family members denies fever, chills, nausea, vomiting, diarrhea, shortness of breath, cough, chest pain, palpitations, or any other complaints.     In ED, her vitals showed low BP of 90/70. Initially IJ line was placed and started on IV fluids. Pt pulled out the IJ line in 2 hours. No IV access was done again as the family doesn't want any fluids. Her Labs showed low H/H of 7.7/23. No blood transfusion as per family. Elevated WBC count and troponins. No intervention as per family. No imaging was done. After the admission pt had a V.tach for a movement as per nurse. Pt was on lasix and digoxin on previous admission which is held for low BP and aspirin is held for GI bleed.

## 2017-08-12 NOTE — PATIENT PROFILE ADULT. - LANGUAGE ASSISTANCE NEEDED
No-Patient/Caregiver offered and refused free interpretation services./pt. confused, pts healthcare proxy by bedside, speaks english

## 2017-08-12 NOTE — H&P ADULT - REASON FOR ADMISSION
Blood and stools and poor prognosis Blood in stools and poor prognosis Blood in stools Bloody stools

## 2017-08-12 NOTE — DISCHARGE NOTE ADULT - PATIENT PORTAL LINK FT
“You can access the Follow[employer] Patient Portal, offered by [facility], by registering with the following website: [email or web address]”

## 2017-08-12 NOTE — H&P ADULT - PROBLEM SELECTOR PLAN 3
family requesting no further interventions including blood work and transfusions. only symptom management

## 2017-08-12 NOTE — DISCHARGE NOTE ADULT - MEDICATION SUMMARY - MEDICATIONS TO TAKE
I will START or STAY ON the medications listed below when I get home from the hospital:    aspirin 81 mg oral tablet, chewable  -- 1 tab(s) by mouth once a day  -- Indication: For Heart    digoxin 250 mcg (0.25 mg) oral tablet  -- 1 tab(s) by mouth once a day  -- Indication: For Heart failure    furosemide 40 mg oral tablet  -- 1 tab(s) by mouth once a day  -- Indication: For Heart failure

## 2017-08-12 NOTE — H&P ADULT - ATTENDING COMMENTS
Patient was seen and examined by myself with team. Case was discussed with house staff in details. Patient was seen and examined by myself with team. Case was discussed with house staff in details.    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 Continue with morphine for comfort.   Has AICD in place- will likely need to deactivate for comfort.  Overall prognosis is poor.  Patient has a DNR/DNI order- family expressed clearly that patient has expressed she does not wish to suffer or be resuscitated  Based on patient prior expressed wishes, she does not want any aggressive interventions. Goals of care comfort. Patient was seen and examined by myself with team. Case was discussed with house staff in details.  88 yo F pt with PMH of severe systolic heart failure, A-Fib, DM type 2, presented to the ED with bleeding per rectum . She was founc to have hb 7.7  down from prior hb of 16. Per family she has had decreased appetite x 1 week and noted with  dark red bloody stool rectally x 4 days. As per family member, Patient was undergoing assessment for home hospice but due to her deteriorating clinical state, acute bleeding and progression of symptoms, patient was bought to the hospital. They brought her to ED to avoid suffering and wish for comfort care. Following discussion with the health care proxy Mr Kinney and Miss Maravilla ([phone], [phone])  they are aware that the overall prognosis is poor and have had discussion about prognosis with her cardiologist (Dr. Gomes) {who expressed during her last hospitalization that prognosis is poor.}  She is admitted for   1. Acute blood loss anemia  2. GI bleed  3. End stage chronic systolic and diastolic heart failure (s/p BiV AICD 2010 EF 25% with G3 DD)   4. Bilateral LE edema due to heart failure  5. Debility with progressive weakness  6. Chronic Kidney disease    Plan- supportive measures. Continue with morphine for comfort.   Has AICD in place- will likely need to deactivate for comfort.  Overall prognosis is poor.  Patient has a DNR/DNI order- family expressed clearly that patient has expressed she does not wish to suffer or be resuscitated  Based on patient prior expressed wishes, she does not want any aggressive interventions. Goals of care comfort.  Transfer to in patient hospice.

## 2017-08-12 NOTE — H&P ADULT - NSHPREVIEWOFSYSTEMS_GEN_ALL_CORE
Patient cant answer anything. As per family, pt doesn't have any complaints. Patient cant answer any questions; unable to participate in ROS  History obtained from family.

## 2017-08-12 NOTE — DISCHARGE NOTE ADULT - CARE PROVIDER_API CALL
Gary Gomes (MADELINE), Cardiology  [address] [id number]  Phone: [phone]  Fax: [phone]

## 2017-08-12 NOTE — ED PROVIDER NOTE - OBJECTIVE STATEMENT
89 y/o F pt with PMHx of severe systolic heart failure, A-Fib, DM type 2, under evaluation for palliative care and is BIB family members to the ED c/o decreased appetite x 1 week and rectal with dark red blood x 4 days. As per family member, pt's PMD (Dr. Gomes) stated that the pt's prognosis is poor and wanted to see options for comfort care. Pt was previously admitted and discharged in July 2017. Now, pt is currently under evaluation at home hospice. Family members denies fever, chills, nausea, vomiting, diarrhea, shortness of breath, cough, chest pain, palpitations, or any other complaints. NKDA. 88 y/o F pt with PMHx of severe systolic heart failure, A-Fib, DM type 2, under evaluation for palliative care and is BIB family members to the ED c/o decreased appetite x 1 week and rectal with dark red blood x 4 days. As per family member, pt's cardiologist (Dr. Gomes) stated that the pt's prognosis is poor and wanted to see options for comfort care. Pt was previously admitted and discharged in July 2017. Now, pt is currently under evaluation at home hospice. Family members denies fever, chills, nausea, vomiting, diarrhea, shortness of breath, cough, chest pain, palpitations, or any other complaints. NKDA.

## 2017-08-12 NOTE — ED PROVIDER NOTE - PMH
Acute on chronic systolic congestive heart failure    Cardiomyopathy    History of Hypertension    Mitral Regurgitation    Peripheral Vascular Disease    PVD (Peripheral Vascular Disease)    Systolic heart failure  Severe  Type 2 diabetes mellitus without complication, without long-term current use of insulin

## 2017-08-12 NOTE — ED ADULT NURSE NOTE - OBJECTIVE STATEMENT
PT P/W ABD PAIN --PT IS DNR/DNI. SWELLING AND ECCYMOSIS TO ARMS PT P/W ABD PAIN --PT IS DNR/DNI. SWELLING AND ECCYMOSIS TO ARMS.  PT IS NON VERBAL HAS PERIODS OF LETHARGY AND AGITATION.

## 2017-08-12 NOTE — H&P ADULT - PROBLEM SELECTOR PLAN 3
Consult palliative care attending  Started pt on ativan 0.5mg q4hr morphine 2mg q2hr for comfort care Goals of care - comfort  Started pt on ativan 0.5mg q4hr  and morphine 2mg q2hr for comfort care

## 2017-08-12 NOTE — H&P ADULT - PROBLEM SELECTOR PLAN 2
Started pt on ativan 0.5mg q4hr morphine 2mg q2hr for comfort care Advanced end stage heart failure with overall poor prognosis  Patient wishes for palliative measure and in patient hospice care will be beneficial.  Anticipate transfer to in patient hospice

## 2017-08-12 NOTE — H&P ADULT - ASSESSMENT
88 yo F pt with PMH of severe systolic heart failure, A-Fib, DM type 2, under evaluation for palliative care and is BIB family members to the ED c/o decreased appetite x 1 week and rectal with dark red blood x 4 days. Pt has come to hospital for hospice and comfort care.  Health care proxy doesn't want any aggressive interventions with the patient. They dont want any blood transfusions or NS transfusions for low BP and Hb. Pt is DNR/DNI and they dont want any blood drawings for any daily monitoring. 88 yo F pt with PMH of severe systolic heart failure, A-Fib, DM type 2, under evaluation for palliative care and is BIB family members to the ED c/o decreased appetite x 1 week and rectal with dark red blood x 4 days. Pt has come to hospital due to progressive weakness and wish for hospice and comfort care.  Health care proxy doesn't want any aggressive interventions with the patient. They don't want any blood transfusions or NS transfusions for low BP and Hb. Pt is DNR/DNI and they don't want any blood drawings for any daily monitoring.

## 2017-08-12 NOTE — DISCHARGE NOTE ADULT - HOSPITAL COURSE
HPI:  89 yo F pt with PMH of severe systolic heart failure, A-Fib, DM type 2, presented to the ED with bleeding per rectum . She was founc to have hb 7.7  down from prior hb of 16.  Per family. has had decreased appetite x 1 week and noted with  dark red bloody stool rectally x 4 days. As per family member, Patient was undergoing assessment for home hospice but due to her deteriorating clinical state, acute bleeding and progression of symptoms, patient was bought to the hospital.. They brought her to ED and do wish for comfort care. Upon talking to the health care proxy Mr Kinney and Miss Maravilla ([phone], [phone]) that Pt's cardiologist (Dr. Gomes) expressed during her last hospitalization that prognosis is poor.   Health care proxy doesn't want any aggressive interventions based on patient known wishes. They dont want  blood transfusions or NS transfusions for low BP and Hb. Pt is DNR DNI and they dont want any blood drawings for any daily monitoring.   In ED, her vitals showed low BP of 90/70.Her Labs showed low H/H of 7.7/23. No blood transfusion as per family.     Hospital course- uncomplicated. She is being transferred to in patient hospice care.

## 2017-08-12 NOTE — DISCHARGE NOTE ADULT - CARE PLAN
Principal Discharge DX:	Acute blood loss anemia  Goal:	Palliative measures  Instructions for follow-up, activity and diet:	Transfer to inpatient hospice  Secondary Diagnosis:	Acute on chronic systolic congestive heart failure  Secondary Diagnosis:	GI bleeding

## 2017-08-12 NOTE — ED PROVIDER NOTE - MEDICAL DECISION MAKING DETAILS
89 y/o F pt with severe systolic heart failure, consideration for hospice care, here for rectal bleeding and decreased appetite. Concern for multi organ failure. Family declined any diagnostic test and are requesting palliative care

## 2017-08-12 NOTE — ED PROVIDER NOTE - PROGRESS NOTE DETAILS
Discussed with pt's grandson (Nirmal Arguello [phone]) who after discussion with the rest of the family, agreed to plan of IV of palliative meds and admission to hospice. Discussed with Dr. Yu who will see pt tomorrow. Discussed with Dr. Agarwal who agrees to admission. Discussed with pt's grandson (Nirmal Arguello [phone]) who after discussion with the rest of the family, agreed to plan of IV for palliative meds and admission for inpatient hospice evaluation. Discussed with Dr. Yu who will see pt tomorrow. Discussed with Dr. Agarwal who agrees to admission. Discussed with pt's grandson (Nirmal Arguello [phone]) who after discussion with the rest of the family, agreed to plan of IV for palliative meds and admission for inpatient hospice evaluation. Discussed with Dr. Yu who will see pt tomorrow. Discussed with Dr. Agarwal who agrees to admission, made aware that pt was admitted to Dr. Patel during previous visit.

## 2017-08-12 NOTE — H&P ADULT - PROBLEM SELECTOR PLAN 1
with dyspnea due to progressive heart failure and anemia, ativan and morphine PRN - pt is responding well to them, oxygen, no need for any other medication - family requesting just comfort/symptom management

## 2017-08-13 RX ADMIN — MORPHINE SULFATE 2 MILLIGRAM(S): 50 CAPSULE, EXTENDED RELEASE ORAL at 17:03

## 2017-08-13 RX ADMIN — ROBINUL 0.2 MILLIGRAM(S): 0.2 INJECTION INTRAMUSCULAR; INTRAVENOUS at 20:06

## 2017-08-13 RX ADMIN — MORPHINE SULFATE 2 MILLIGRAM(S): 50 CAPSULE, EXTENDED RELEASE ORAL at 16:46

## 2017-08-13 RX ADMIN — MORPHINE SULFATE 2 MILLIGRAM(S): 50 CAPSULE, EXTENDED RELEASE ORAL at 04:23

## 2017-08-13 RX ADMIN — Medication 0.5 MILLIGRAM(S): at 13:22

## 2017-08-13 RX ADMIN — MORPHINE SULFATE 2 MILLIGRAM(S): 50 CAPSULE, EXTENDED RELEASE ORAL at 03:36

## 2017-08-14 DIAGNOSIS — L98.9 DISORDER OF THE SKIN AND SUBCUTANEOUS TISSUE, UNSPECIFIED: ICD-10-CM

## 2017-08-14 DIAGNOSIS — R06.00 DYSPNEA, UNSPECIFIED: ICD-10-CM

## 2017-08-14 DIAGNOSIS — K11.7 DISTURBANCES OF SALIVARY SECRETION: ICD-10-CM

## 2017-08-14 DIAGNOSIS — K59.03 DRUG INDUCED CONSTIPATION: ICD-10-CM

## 2017-08-14 DIAGNOSIS — R52 PAIN, UNSPECIFIED: ICD-10-CM

## 2017-08-14 RX ORDER — ROBINUL 0.2 MG/ML
0.4 INJECTION INTRAMUSCULAR; INTRAVENOUS EVERY 4 HOURS
Qty: 0 | Refills: 0 | Status: DISCONTINUED | OUTPATIENT
Start: 2017-08-14 | End: 2017-08-15

## 2017-08-14 RX ADMIN — MORPHINE SULFATE 2 MILLIGRAM(S): 50 CAPSULE, EXTENDED RELEASE ORAL at 11:30

## 2017-08-14 RX ADMIN — ROBINUL 0.2 MILLIGRAM(S): 0.2 INJECTION INTRAMUSCULAR; INTRAVENOUS at 11:30

## 2017-08-14 RX ADMIN — ROBINUL 0.4 MILLIGRAM(S): 0.2 INJECTION INTRAMUSCULAR; INTRAVENOUS at 18:07

## 2017-08-14 RX ADMIN — MORPHINE SULFATE 2 MILLIGRAM(S): 50 CAPSULE, EXTENDED RELEASE ORAL at 11:29

## 2017-08-14 RX ADMIN — MORPHINE SULFATE 2 MILLIGRAM(S): 50 CAPSULE, EXTENDED RELEASE ORAL at 18:11

## 2017-08-14 RX ADMIN — MORPHINE SULFATE 2 MILLIGRAM(S): 50 CAPSULE, EXTENDED RELEASE ORAL at 03:51

## 2017-08-14 RX ADMIN — ROBINUL 0.4 MILLIGRAM(S): 0.2 INJECTION INTRAMUSCULAR; INTRAVENOUS at 21:36

## 2017-08-14 NOTE — PROGRESS NOTE ADULT - SUBJECTIVE AND OBJECTIVE BOX
JOSE CARLOS MORALESIDA                    89y  Female    Allergies    No Known Allergies    Intolerances        Symptoms:  Pain (1-10):0  Dyspnea:0  Nausea/Vomitin  Secretions: ++  Agitation:0  Symptom Requiring Inpatient Hospice Admission:0    Overnight events/interim history: morphine given times 2 for dyspnea with improvement and robinul given times one for secretions with improvement    HPI:  88 yo F pt with hospice diagnosis of severe systolic heart failure with past medical history of A-Fib, DM type 2, presented to the ED with bleeding per rectum . She was founc to have hb 7.7  down from prior hb of 16.   The patient was noted to have decreased appetite x 1 week and noted with  dark red bloody stool rectally x 4 days. As per family member, Patient was undergoing assessment for home hospice but due to her deteriorating clinical state, acute bleeding and progression of symptoms, patient was bought to the hospital.. They brought her to ED and do wish for comfort care. Upon talking to the health care proxy Mr Kinney and Miss Maravilla ([phone], [phone]) that Pt's cardiologist (Dr. Gomes) expressed during her last hospitalization that prognosis is poor.   Health care proxy doesn't want any aggressive interventions based on patient known wishes. They dont want  blood transfusions or NS transfusions for low BP and Hb. Pt is DNR DNI and they dont want any blood drawings for any daily monitoring.     Pt was found to be eligible for inpt hospice due to agitation, SOB from GIB and endstage heart failure (12 Aug 2017 17:04)          PPSV2: 10    Code Status: DNR          MEDICATIONS  (STANDING):  glycopyrrolate Injectable 0.4 milliGRAM(s) IV Push every 4 hours    MEDICATIONS  (PRN):  LORazepam   Injectable 0.5 milliGRAM(s) IV Push every 1 hour PRN Agitation  bisacodyl Suppository 10 milliGRAM(s) Rectal daily PRN Constipation  acetaminophen  Suppository 650 milliGRAM(s) Rectal every 6 hours PRN For Temp greater than 38 C (100.4 F)  morphine  - Injectable 2 milliGRAM(s) IV Push every 30 minutes PRN tachypenia                             Vital Signs Last 24 Hrs  T(C): 36.9 (14 Aug 2017 08:39), Max: 36.9 (14 Aug 2017 08:39)  T(F): 98.5 (14 Aug 2017 08:39), Max: 98.5 (14 Aug 2017 08:39)  HR: 153 (14 Aug 2017 08:39) (102 - 153)  BP: 77/53 (14 Aug 2017 08:39) (77/53 - 83/59)  BP(mean): --  RR: 16 (14 Aug 2017 08:39) (16 - 19)  SpO2: 97% (14 Aug 2017 08:39) (96% - 97%)    PHYSICAL EXAM:    HEENT: moderate bitemporal wasting  Neck: no JVD no nodes no thyromegaly  Lungs: decreased breath sounds and rales at bases  Heart: s1s2 no audible murmurs  Abd: soft ND NT no masses no organomegaly  Ext: no edema, no redness

## 2017-08-14 NOTE — PROGRESS NOTE ADULT - ASSESSMENT
89 yo F pt with hospice diagnosis of severe systolic heart failure admitted to the IPU at [facility] for IV management of dyspnea and secretions.

## 2017-08-14 NOTE — ADVANCED PRACTICE NURSE CONSULT - RECOMMEDATIONS
-Clean the Coccyx wound with normal saline and apply skin prep to the surrounding skin  -Apply a Foam dressing Q 72hrs PRN  -Elevate/float the patients heels using heel protectors and reposition the patient Q 2hrs using wedges or pillows

## 2017-08-14 NOTE — ADVANCED PRACTICE NURSE CONSULT - ASSESSMENT
This is a 69yr old female patient admitted for Heart Disease, presenting with a Coccyx Stage 2 Pressure Injury (3cm x 1cm) with pink tissue and fibrinous exude present

## 2017-08-15 VITALS
RESPIRATION RATE: 20 BRPM | HEART RATE: 110 BPM | SYSTOLIC BLOOD PRESSURE: 61 MMHG | TEMPERATURE: 98 F | OXYGEN SATURATION: 100 % | DIASTOLIC BLOOD PRESSURE: 26 MMHG

## 2017-08-15 DIAGNOSIS — I50.23 ACUTE ON CHRONIC SYSTOLIC (CONGESTIVE) HEART FAILURE: ICD-10-CM

## 2017-08-15 DIAGNOSIS — I73.9 PERIPHERAL VASCULAR DISEASE, UNSPECIFIED: ICD-10-CM

## 2017-08-15 DIAGNOSIS — D62 ACUTE POSTHEMORRHAGIC ANEMIA: ICD-10-CM

## 2017-08-15 DIAGNOSIS — Z51.5 ENCOUNTER FOR PALLIATIVE CARE: ICD-10-CM

## 2017-08-15 DIAGNOSIS — N18.9 CHRONIC KIDNEY DISEASE, UNSPECIFIED: ICD-10-CM

## 2017-08-15 DIAGNOSIS — R60.0 LOCALIZED EDEMA: ICD-10-CM

## 2017-08-15 DIAGNOSIS — I42.9 CARDIOMYOPATHY, UNSPECIFIED: ICD-10-CM

## 2017-08-15 DIAGNOSIS — E11.9 TYPE 2 DIABETES MELLITUS WITHOUT COMPLICATIONS: ICD-10-CM

## 2017-08-15 DIAGNOSIS — I47.2 VENTRICULAR TACHYCARDIA: ICD-10-CM

## 2017-08-15 DIAGNOSIS — Z95.810 PRESENCE OF AUTOMATIC (IMPLANTABLE) CARDIAC DEFIBRILLATOR: ICD-10-CM

## 2017-08-15 DIAGNOSIS — Z79.82 LONG TERM (CURRENT) USE OF ASPIRIN: ICD-10-CM

## 2017-08-15 DIAGNOSIS — K92.2 GASTROINTESTINAL HEMORRHAGE, UNSPECIFIED: ICD-10-CM

## 2017-08-15 DIAGNOSIS — Z66 DO NOT RESUSCITATE: ICD-10-CM

## 2017-08-15 DIAGNOSIS — I13.0 HYPERTENSIVE HEART AND CHRONIC KIDNEY DISEASE WITH HEART FAILURE AND STAGE 1 THROUGH STAGE 4 CHRONIC KIDNEY DISEASE, OR UNSPECIFIED CHRONIC KIDNEY DISEASE: ICD-10-CM

## 2017-08-15 DIAGNOSIS — I48.91 UNSPECIFIED ATRIAL FIBRILLATION: ICD-10-CM

## 2017-08-15 RX ADMIN — ROBINUL 0.4 MILLIGRAM(S): 0.2 INJECTION INTRAMUSCULAR; INTRAVENOUS at 06:43

## 2017-08-15 RX ADMIN — MORPHINE SULFATE 2 MILLIGRAM(S): 50 CAPSULE, EXTENDED RELEASE ORAL at 03:43

## 2017-08-15 RX ADMIN — MORPHINE SULFATE 2 MILLIGRAM(S): 50 CAPSULE, EXTENDED RELEASE ORAL at 03:24

## 2017-08-15 RX ADMIN — ROBINUL 0.4 MILLIGRAM(S): 0.2 INJECTION INTRAMUSCULAR; INTRAVENOUS at 03:24

## 2017-08-15 NOTE — DISCHARGE NOTE FOR THE EXPIRED PATIENT - HOSPITAL COURSE
89 yo F pt with PMH of severe systolic heart failure, A-Fib, DM type 2, presented to the ED with bleeding per rectum . She was founc to have hb 7.7  down from prior hb of 16.  Per family. has had decreased appetite x 1 week and noted with  dark red bloody stool rectally x 4 days. As per family member, Patient was undergoing assessment for home hospice but due to her deteriorating clinical state, acute bleeding and progression of symptoms, patient was bought to the hospital.. They brought her to ED and do wish for comfort care. Upon talking to the health care proxy Mr Kinney and Miss Maravilla ([phone], [phone]) that Pt's cardiologist (Dr. Gomes) expressed during her last hospitalization that prognosis is poor.   Health care proxy doesn't want any aggressive interventions based on patient known wishes. They dont want  blood transfusions or NS transfusions for low BP and Hb. Pt is DNR DNI and they dont want any blood drawings for any daily monitoring.   In ED, her vitals showed low BP of 90/70.Her Labs showed low H/H of 7.7/23. No blood transfusion as per family.     Pt was found to be eligible for inpt hospice due to agitation, SOB from GIB and endstage heart failure    Patient  at 9:43 am. 8/15/2017 88 yo F pt with PMH of severe systolic heart failure, A-Fib, DM type 2, presented to the ED with bleeding per rectum . She was founc to have hb 7.7  down from prior hb of 16.  Per family. has had decreased appetite x 1 week and noted with  dark red bloody stool rectally x 4 days. As per family member, Patient was undergoing assessment for home hospice but due to her deteriorating clinical state, acute bleeding and progression of symptoms, patient was bought to the hospital.. They brought her to ED and do wish for comfort care. Upon talking to the health care proxy Mr Kinney and Miss Maravilla ([phone], [phone]) that Pt's cardiologist (Dr. Gomes) expressed during her last hospitalization that prognosis is poor.   Health care proxy doesn't want any aggressive interventions based on patient known wishes. They dont want  blood transfusions or NS transfusions for low BP and Hb. Pt is DNR DNI and they dont want any blood drawings for any daily monitoring.   In ED, her vitals showed low BP of 90/70.Her Labs showed low H/H of 7.7/23. No blood transfusion as per family.     Pt was found to be eligible for inpt hospice due to agitation, SOB from GIB and endstage heart failure    Autopsy not indicated    Patient  at 9:43 am. 8/15/2017

## 2017-08-17 ENCOUNTER — APPOINTMENT (OUTPATIENT)
Dept: ELECTROPHYSIOLOGY | Facility: CLINIC | Age: 82
End: 2017-08-17

## 2017-08-17 DIAGNOSIS — E11.9 TYPE 2 DIABETES MELLITUS WITHOUT COMPLICATIONS: ICD-10-CM

## 2017-08-17 DIAGNOSIS — Z51.5 ENCOUNTER FOR PALLIATIVE CARE: ICD-10-CM

## 2017-08-17 DIAGNOSIS — I50.23 ACUTE ON CHRONIC SYSTOLIC (CONGESTIVE) HEART FAILURE: ICD-10-CM

## 2017-08-17 DIAGNOSIS — Z66 DO NOT RESUSCITATE: ICD-10-CM

## 2017-08-17 DIAGNOSIS — I73.9 PERIPHERAL VASCULAR DISEASE, UNSPECIFIED: ICD-10-CM

## 2017-08-17 DIAGNOSIS — I48.91 UNSPECIFIED ATRIAL FIBRILLATION: ICD-10-CM

## 2017-08-17 DIAGNOSIS — R45.1 RESTLESSNESS AND AGITATION: ICD-10-CM

## 2017-08-17 DIAGNOSIS — K92.2 GASTROINTESTINAL HEMORRHAGE, UNSPECIFIED: ICD-10-CM

## 2017-08-17 DIAGNOSIS — K59.00 CONSTIPATION, UNSPECIFIED: ICD-10-CM

## 2017-08-17 DIAGNOSIS — T40.2X5A ADVERSE EFFECT OF OTHER OPIOIDS, INITIAL ENCOUNTER: ICD-10-CM

## 2017-08-17 DIAGNOSIS — D62 ACUTE POSTHEMORRHAGIC ANEMIA: ICD-10-CM

## 2017-08-17 DIAGNOSIS — Z95.810 PRESENCE OF AUTOMATIC (IMPLANTABLE) CARDIAC DEFIBRILLATOR: ICD-10-CM

## 2017-08-18 DIAGNOSIS — Z74.01 BED CONFINEMENT STATUS: ICD-10-CM

## 2017-08-18 DIAGNOSIS — K59.03 DRUG INDUCED CONSTIPATION: ICD-10-CM

## 2017-08-18 DIAGNOSIS — R32 UNSPECIFIED URINARY INCONTINENCE: ICD-10-CM

## 2017-08-18 DIAGNOSIS — I11.0 HYPERTENSIVE HEART DISEASE WITH HEART FAILURE: ICD-10-CM

## 2017-08-18 DIAGNOSIS — E11.51 TYPE 2 DIABETES MELLITUS WITH DIABETIC PERIPHERAL ANGIOPATHY WITHOUT GANGRENE: ICD-10-CM

## 2017-08-18 DIAGNOSIS — D64.9 ANEMIA, UNSPECIFIED: ICD-10-CM

## 2017-08-18 DIAGNOSIS — I34.0 NONRHEUMATIC MITRAL (VALVE) INSUFFICIENCY: ICD-10-CM

## 2017-08-18 DIAGNOSIS — I42.9 CARDIOMYOPATHY, UNSPECIFIED: ICD-10-CM

## 2017-10-02 NOTE — PATIENT PROFILE ADULT. - PRO MENTAL HEALTH SX RECENT
"Subjective   Radha Munoz none

## 2017-12-15 NOTE — ED ADULT NURSE NOTE - BREATHING, MLM
Final Anesthesia Post-op Assessment    Patient: Ting Guy  Procedure(s) Performed: COLONOSCOPY WITH POSSIBLE BIOPSY  Anesthesia type: Monitor Anesthesia Care    Vital Last Value   Temperature 36.2 °C (97.2 °F) (12/15/17 1305)   Pulse 60 (12/15/17 1315)   Respiratory Rate 18 (12/15/17 1315)   Non-Invasive   Blood Pressure 103/71 (12/15/17 1315)   Arterial  Blood Pressure     Pulse Oximetry 99 % (12/15/17 1315)     Last 24 I/O:   Intake/Output Summary (Last 24 hours) at 12/15/17 1331  Last data filed at 12/15/17 1301   Gross per 24 hour   Intake              400 ml   Output                0 ml   Net              400 ml       PATIENT LOCATION: Phase II  POST-OP VITAL SIGNS: stable  LEVEL OF CONSCIOUSNESS: participates in exam, awake, oriented, alert and answers questions appropriately  RESPIRATORY STATUS: spontaneous ventilation and unassisted  CARDIOVASCULAR: stable  HYDRATION: euvolemic    PAIN MANAGEMENT: well controlled  NAUSEA: None  AIRWAY PATENCY: patent  POST-OP ASSESSMENT: no complications, patient tolerated procedure well with no complications, no evidence of recall and sufficiently recovered from acute administration of anesthesia effects and able to participate in evaluation  COMPLICATIONS: none       Spontaneous, unlabored and symmetrical

## 2018-02-11 NOTE — CONSULT NOTE ADULT - PROVIDER SPECIALTY LIST ADULT
Cardiology
Palliative Care
Pulmonology
36 yo R handed F with h/o PCOS, cervical and lumbar radiculopathy, 20 p.y smoker, p.w acute onset L shoulder and neck pain radiating to the left face and arm a/w numbness and paresthesias in L side of her face and L UE. Pt with known cervical disk herniations. If MRI brain, negative, pt will be discharged home to f/u with PMD, neuro Dr. Headley as outpt.

## 2018-05-01 NOTE — ED ADULT NURSE NOTE - CHIEF COMPLAINT
The patient is a 89y Female complaining of dizziness. Supervision was available Supervision was available

## 2019-10-21 NOTE — PATIENT PROFILE ADULT. - NS PRO PT REFERRAL QUES 2 YN
no Epidermal Autograft Text: The defect edges were debeveled with a #15 scalpel blade.  Given the location of the defect, shape of the defect and the proximity to free margins an epidermal autograft was deemed most appropriate.  Using a sterile surgical marker, the primary defect shape was transferred to the donor site. The epidermal graft was then harvested.  The skin graft was then placed in the primary defect and oriented appropriately.

## 2020-10-19 NOTE — ED PROVIDER NOTE - CHPI ED SYMPTOMS POS
cSTUDY ORDER CONFIRMATION  Beni Bangr 2011      Type of Study Requested:   Referring Physician: Kevin Boyle  Referring Physicians Fax # for results: [phone]    Rmexil085.2 USP  Weight: 27.4 kg  (over 499 lb can only be done at [facility])  BMI: 15.95      Snoring      yes    Excessive Daytime Sleepiness   no    Witnessed Apnea     no    Hypertension      no    Cardiovascular disease (CHF-Heart Failure)  no    COPD or Emphysema?    no  On Oxygen? nlpm Day/Night   no    Restless Leg Symptoms-   Do you experience an uncomfortable sensation in your legs   especially at night?    no   Kicking?     no   Twitching?     no    Do you experience insomnia?   no  Sleep talking/walking?     no  Do you ever wake with a rapid heart rate?  no  Unusual movements in sleep (violent, flailing limbs? )no  Night Terrors?      no  Sleep Paralysis or Sleep Hallucinations:  (while waking from sleep or dream, you cannot move) no  Do you/have you had seizures?   no  Have you had a Stroke, CVA or TIA?   no       When? no    Do you take any medications for the following? Pain       no  Anxiety       no  Sleep       no               Have you been in hospital?    no   Has it been at least 72 hrs. since discharge? no   Discharge Date n  (If not at least 72 hrs, cannot see pt. for study)    Are you currently on an antibiotic?   no  If yes, for what reason? no     Do you need anything from us for your employer? no    Are you a CDL, DOT or other Certified ? no     Have you had a sleep study before?   no  If yes, when and where? n/a  Are you currently using CPAP?   no  If yes, what is the setting?n/    Do you have any special needs? Wheelchair      no  Help to and from the bathroom   no  Other?no      no    (If yes to any special needs a care giver may need to come with the patient and stay the night.)    Will someone need to accompany you on the night of your study?   (Primarily for parents of minors, patients with special needs, elderly, long distance travel). Other family,  may stay until study begins. \"We want to be sure to take the best care of you. Are there Cultural or Spiritual needs that we should be aware of or are there any concerns that I can address for you?        no    If yes, describe:no     Attach Medication List    noIf yes to any \"*\" or special needs, discuss with the Lead Tech    Notes:     Study date:11/13/2020  Time:8:30 Location:[facility]      Compiled by:  LUDMILA Meza     Date: 10/19/2020 LE swelling./COUGH

## 2021-07-19 NOTE — ED PROVIDER NOTE - INPATIENT RESIDENT/ACP NOTIFIED DATE
14-Jun-2017 11:27 Doxycycline Counseling:  Patient counseled regarding possible photosensitivity and increased risk for sunburn.  Patient instructed to avoid sunlight, if possible.  When exposed to sunlight, patients should wear protective clothing, sunglasses, and sunscreen.  The patient was instructed to call the office immediately if the following severe adverse effects occur:  hearing changes, easy bruising/bleeding, severe headache, or vision changes.  The patient verbalized understanding of the proper use and possible adverse effects of doxycycline.  All of the patient's questions and concerns were addressed.

## 2022-03-07 NOTE — ED PROVIDER NOTE - NS ED NOTE AC HIGH RISK COUNTRIES
Price (Do Not Change): 0.00 Instructions: This plan will send the code FBSD to the PM system.  DO NOT or CHANGE the price. Detail Level: Simple No

## 2022-06-20 NOTE — ED ADULT TRIAGE NOTE - NS ED TRIAGE AVPU SCALE
20-Jun-2022 16:03
Alert-The patient is alert, awake and responds to voice. The patient is oriented to time, place, and person. The triage nurse is able to obtain subjective information.

## 2022-08-18 NOTE — ED ADULT NURSE NOTE - FALL HARM RISK TYPE OF ASSESSMENT
Intubation    Date/Time: 8/18/2022 5:49 AM  Performed by: Sharon Mello CRNA  Authorized by: Georges Smith MD     Intubation:     Induction:  Intravenous    Intubated:  Postinduction    Mask Ventilation:  Easy mask    Attempts:  1    Attempted By:  CRNA    Method of Intubation:  Direct    Blade:  Courtney 2    Laryngeal View Grade: Grade I - full view of cords      Difficult Airway Encountered?: No      Complications:  None    Airway Device:  Oral endotracheal tube    Airway Device Size:  7.5    Style/Cuff Inflation:  Cuffed (inflated to minimal occlusive pressure)    Tube secured:  22    Secured at:  The lips    Placement Verified By:  Capnometry    Complicating Factors:  None    Findings Post-Intubation:  BS equal bilateral and atraumatic/condition of teeth unchanged    
Daily Assessment

## 2022-11-23 NOTE — H&P ADULT - NEGATIVE GENERAL SYMPTOMS
no sweating/no weight loss/no polydipsia/no weight gain/no polyuria/no chills/no polyphagia/no fever/no anorexia reactive

## 2023-04-07 NOTE — ED PROVIDER NOTE - CARE PLAN
Sunscreen Recommendations: [employer] Detail Level: Generalized Skin Checks Recommendations: at least annually Detail Level: Zone Detail Level: Detailed Principal Discharge DX:	Cardiomyopathy

## 2023-06-12 NOTE — PROVIDER CONTACT NOTE (OTHER) - NAME OF MD/NP/PA/DO NOTIFIED:
Problem: At Risk for Falls  Goal: # Patient does not fall  Outcome: Outcome Met, Continue evaluating goal progress toward completion  Goal: # Takes action to control fall-related risks  Outcome: Outcome Met, Continue evaluating goal progress toward completion  Goal: # Verbalizes understanding of fall risk/precautions  Description: Document education using the patient education activity  Outcome: Outcome Met, Continue evaluating goal progress toward completion     Problem: At Risk for Injury Due to Fall  Goal: # Patient does not fall  Outcome: Outcome Met, Continue evaluating goal progress toward completion  Goal: # Takes action to control condition specific risks  Outcome: Outcome Met, Continue evaluating goal progress toward completion  Goal: # Verbalizes understanding of fall-related injury personal risks  Description: Document education using the patient education activity  Outcome: Outcome Met, Continue evaluating goal progress toward completion     Problem: Fluid Volume Excess, Risk for  Goal: # Absence of Rapid Weight Gain (no more than 2kg in 24 hours)  Description: FVE Risk Patients may gain weight (but not more than 2 kg) but may not require aggressive treatment if in the absence of dyspnea; FVE (actual) patients should be monitored to achieve no weight gain.   Outcome: Outcome Met, Continue evaluating goal progress toward completion  Goal: # Absence of New Onset Dyspnea  Description: Dyspnea greater than SOB with Activity may be indicator of fluid volume excess  Outcome: Outcome Met, Continue evaluating goal progress toward completion  Goal: # Verbalizes understanding of FVE prevention plan  Description: Document on Patient Education Activity  Outcome: Outcome Met, Continue evaluating goal progress toward completion     Problem: Fluid Volume Excess  Goal: # Fluid Volume Excess Symptoms Resolved  Description: Treatment often consists of oxygen and respiratory support with diuretic therapy at doses that  Dr Gomez exceed usual dose (typically doubled).  Monitor patient response to treatment.    Acute dyspnea should resolve quickly if dose is adequate and kidney function is adequate. Dyspnea/SOB should only be observed with Activity after effective treatment. Patient should be able to lie down comfortably, without SOB.  Outcome: Outcome Met, Continue evaluating goal progress toward completion  Goal: # Oxygenation is maintained (SpO2 greater than or equal to 90% or as ordered)  Outcome: Outcome Met, Continue evaluating goal progress toward completion  Goal: # Verbalizes understanding of FVE management plan  Description: Document on Patient Education Activity  Outcome: Outcome Met, Continue evaluating goal progress toward completion     Problem: Pain  Goal: #Acceptable pain level achieved/maintained at rest using NRS/Faces  Description: This goal is used for patients who can self-report.  Acceptable means the level is at or below the identified comfort/function goal.  Outcome: Outcome Met, Continue evaluating goal progress toward completion  Goal: # Acceptable pain level achieved/maintained at rest using NRS/Faces without oversedation (opioid naive or PCA/Epidural infusion)  Description: This goal is used if Opioid-naïve or on PCA/Epidural Infusion.  Outcome: Outcome Met, Continue evaluating goal progress toward completion  Goal: # Acceptable pain level achieved/maintained with activity using NRS/Faces  Description: This goal is used for patients who can self-report and are not achieving acceptable pain control during activity.  Outcome: Outcome Met, Continue evaluating goal progress toward completion  Goal: Acceptable pain/comfort level is achieved/maintained at rest (based on Pain Behaviors Scale)  Description: This goal is used for patients who are not able to self-report pain and are assessed for pain using the Pain Behaviors Scale  Outcome: Outcome Met, Continue evaluating goal progress toward completion  Goal: Acceptable  pain/comfort level is achieved/maintained at rest (based on pediatric behavior tool: NIPS, NPASS, or FLACC)  Description: This goal is used for pediatric patients who are not able to self report pain.  Outcome: Outcome Met, Continue evaluating goal progress toward completion  Goal: # Verbalizes understanding of pain management  Description: Documented in Patient Education Activity  Outcome: Outcome Met, Continue evaluating goal progress toward completion

## 2023-07-21 NOTE — ED ADULT TRIAGE NOTE - BMI (KG/M2)
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 NIHSS 7. mRS 1 (has not returned to driving, otherwise able to do all ADL's independently and back to work). \par - Continue HTN optimization, SBP goal <140\par - CUS\par - Start baclofen 5mg BID for spasticity\par - Botox referral\par \par RTC in 5-6 mo
25.1

## 2023-10-10 NOTE — H&P ADULT - PROBLEM SELECTOR PLAN 6
spoke with grandson and daughter about above plan and they agree. they are aware that prognosis is likely days. support given. 3 = A little assistance

## 2024-01-01 NOTE — ED ADULT TRIAGE NOTE - WEIGHT IN KG
60.3
You can access the FollowMyHealth Patient Portal offered by [facility] by registering at the following website: [email or web address]. By joining [employer]’s FollowMyHealth portal, you will also be able to view your health information using other applications (apps) compatible with our system.

## 2024-01-16 NOTE — PATIENT PROFILE ADULT. - [facility]
Interval History:  No events overnight. Appears to be in atrial flutter but still with AV dissociation consistent with CHB.     Review of Systems   Constitutional: Positive for malaise/fatigue.   Cardiovascular:  Positive for dyspnea on exertion and palpitations. Negative for chest pain, irregular heartbeat, near-syncope and syncope.     Objective:     Vital Signs (Most Recent):  Temp: 98.3 °F (36.8 °C) (01/16/24 1101)  Pulse: 64 (01/16/24 1101)  Resp: (!) 25 (01/16/24 1101)  BP: (!) 116/57 (01/16/24 1101)  SpO2: 96 % (01/16/24 1101) Vital Signs (24h Range):  Temp:  [98.3 °F (36.8 °C)-98.9 °F (37.2 °C)] 98.3 °F (36.8 °C)  Pulse:  [60-72] 64  Resp:  [11-35] 25  SpO2:  [95 %-100 %] 96 %  BP: (107-132)/(54-95) 116/57     Weight: 80.7 kg (177 lb 12.8 oz)  Body mass index is 33.6 kg/m².     SpO2: 96 %        Physical Exam  Vitals and nursing note reviewed.   Constitutional:       General: She is not in acute distress.     Appearance: She is not ill-appearing.   HENT:      Head: Normocephalic and atraumatic.      Right Ear: External ear normal.      Left Ear: External ear normal.      Mouth/Throat:      Mouth: Mucous membranes are moist.      Pharynx: No oropharyngeal exudate.   Eyes:      Extraocular Movements: Extraocular movements intact.      Pupils: Pupils are equal, round, and reactive to light.   Cardiovascular:      Rate and Rhythm: Normal rate and regular rhythm.      Pulses: Normal pulses.      Heart sounds: Normal heart sounds. No murmur heard.     Comments: Epicardial pacer wires in place  Pulmonary:      Effort: Pulmonary effort is normal. No respiratory distress.      Breath sounds: Normal breath sounds. No wheezing or rales.   Abdominal:      General: Abdomen is flat. Bowel sounds are normal. There is no distension.      Palpations: Abdomen is soft.      Tenderness: There is no abdominal tenderness.   Musculoskeletal:         General: No signs of injury. Normal range of motion.      Cervical back: Normal  range of motion.      Right lower leg: No edema.      Left lower leg: No edema.   Skin:     General: Skin is warm and dry.   Neurological:      General: No focal deficit present.      Mental Status: She is alert and oriented to person, place, and time.      Sensory: No sensory deficit.      Motor: No weakness.   Psychiatric:         Mood and Affect: Mood normal.         Behavior: Behavior normal.         Thought Content: Thought content normal.            Significant Labs: EP:   Recent Labs   Lab 01/14/24  1640 01/15/24  0237 01/15/24  0237 01/15/24  1416 01/16/24  0252   NA  --  133*  --   --  134*   K 4.3 4.3  --   --  4.5   CL  --  98  --   --  98   CO2  --  26  --   --  25   GLU  --  97  --   --  96   BUN  --  10  --   --  10   CREATININE  --  0.6  --   --  0.6   CALCIUM  --  9.3  --   --  9.5   PROT  --  6.8  --   --  7.2   ALBUMIN  --  3.1*  --   --  3.2*   BILITOT  --  0.8  --   --  0.8   ALKPHOS  --  61  --   --  65   AST  --  19  --   --  20   ALT  --  17  --   --  15   ANIONGAP  --  9  --   --  11   WBC  --  14.89*  --   --  18.08*   HGB  --  9.3*  --   --  9.7*   HCT  --  29.1*   < >  --  30.4*   PLT  --  457*  --   --  515*   INR  --  2.3*  --  1.8* 1.9*    < > = values in this interval not displayed.         Significant Imaging:Reviewed   (1) problem

## 2024-02-12 NOTE — CHART NOTE - NSCHARTNOTEFT_GEN_A_CORE
[facility]     Subjective:    Chief Complaint: No chief complaint on file.      History of Present Illness:  89 y.o. female presents for multiple issues.   Transitional Care Note    Family and/or Caretaker present at visit?  Yes.  Diagnostic tests reviewed/disposition: I have reviewed all completed as well as pending diagnostic tests at the time of discharge.  Disease/illness education: yes  Home health/community services discussion/referrals: Patient has home health established at discharge .   Establishment or re-establishment of referral orders for community resources: No other necessary community resources.   Discussion with other health care providers: No discussion with other health care providers necessary.               I reviewed the patients chart dating back for the past few appointments. See above.    The following portions of the patient's history were reviewed and updated as appropriate: allergies, current medications, past family history, past medical history, past social history, past surgical history and problem list.    She denies chest pain upon exertion, dyspnea, nausea, vomiting, diaphoresis, and syncope. No pleuritic chest pain, unilateral leg swelling, calf tenderness, or calf pain.      Past Medical History:   Diagnosis Date    ACP (advance care planning) 2024    Amaurosis fugax     Bilateral left eye worse    Anticoagulant long-term use     Arthritis     Glaucoma     resolved    Hyperlipidemia     Hypertension     Stroke 2006       Past Surgical History:   Procedure Laterality Date    APPENDECTOMY  's    BREAST SURGERY      CATARACT EXTRACTION W/  INTRAOCULAR LENS IMPLANT Bilateral     Dr Rojo      SECTION      3 c/s and d/c    COLONOSCOPY N/A 2024    Procedure: COLONOSCOPY;  Surgeon: Trev Lafleur MD;  [facility];  Service: Endoscopy;  Laterality: N/A;    ESOPHAGOGASTRODUODENOSCOPY N/A 2024    Procedure: EGD (ESOPHAGOGASTRODUODENOSCOPY);   Received critical lab value aPTT >200, Heparin drip placed on Hold. Labs ordered. Spoke to patient cardiologist Dr Gomes - recommends d/c heparin drip. Surgeon: Trev Lafleur MD;  Location: [facility];  Service: Endoscopy;  Laterality: N/A;    EYE SURGERY      Glaucoma     HYSTERECTOMY      RIGHT OOPHORECTOMY      With postop hemorrhage    TONSILLECTOMY      Yag Capsulotomy Left 11/11/2021       Social History  Social History     Tobacco Use    Smoking status: Never    Smokeless tobacco: Never   Substance Use Topics    Alcohol use: No    Drug use: No       Family History   Problem Relation Age of Onset    Early death Mother     Stroke Father     Hypertension Father     Diabetes Father     Hypertension Sister     Thyroid disease Paternal Grandfather     No Known Problems Brother     No Known Problems Maternal Aunt     No Known Problems Maternal Uncle     No Known Problems Paternal Aunt     No Known Problems Paternal Uncle     No Known Problems Maternal Grandmother     No Known Problems Maternal Grandfather     No Known Problems Paternal Grandmother     Amblyopia Neg Hx     Blindness Neg Hx     Cancer Neg Hx     Cataracts Neg Hx     Glaucoma Neg Hx     Macular degeneration Neg Hx     Retinal detachment Neg Hx     Strabismus Neg Hx      Review of patient's allergies indicates:   Allergen Reactions    Contrast media Other (See Comments)    Aspirin Other (See Comments)    Ciprofloxacin Other (See Comments)    Iron Other (See Comments)     Small rash with po iron.  Tolerated IV iron    Iodine Rash    Penicillins Rash       Is the problem list complete?:Yes  Is the medication list complete?:Yes  Is the family history complete?:Yes  Do we have a list of the patient's other physicians?:Yes    Depression screen completed?:Yes  Functional assessment completed?:Yes  BP, weight, BMI measured?:Yes  Risk factors identified?:Yes  Immunizations updated and ordered if indicated?:Yes  Preventive checklist updated?:Yes  New schedule of preventive and early detection interventions made?:Yes  Advance directives discussed?:Yes  Referrals made?:No Referrals to: n/a    Patient received  a printed copy of his /her personalized prevention plan given?:No  Printed materials on Advanced directives given?:Yes  All copies of screening paperwork were submitted to be scanned.       4Ms for Medical Decision-Making in Older Adults    Last Completed EAWV: None    MOBILITY:  Get Up and Go:       No data to display              Activities of Daily Living:       No data to display              Whisper Test:       No data to display              Disability Status:      5/6/2018     5:51 AM   Disability Status   Are you deaf or do you have serious difficulty hearing? N   Are you blind or do you have serious difficulty seeing, even when wearing glasses? N   Because of a physical, mental, or emotional condition, do you have serious difficulty concentrating, remembering, or making decisions? N   Do you have serious difficulty walking or climbing stairs? N   Do you have difficulty dressing or bathing? N   Because of a physical, mental, or emotional condition, do you have difficulty doing errands alone such as visiting a doctor's office or shopping? N     Nutrition Screening:       No data to display             Screening Score: 0-7 Malnourished, 8-11 At Risk, 12-14 Normal    MENTATION:   Depression Patient Health Questionnaire:      3/8/2023    11:08 AM   Depression Patient Health Questionnaire   Over the last two weeks how often have you been bothered by little interest or pleasure in doing things Not at all   Over the last two weeks how often have you been bothered by feeling down, depressed or hopeless Not at all   PHQ-2 Total Score 0     Has Dementia Dx: No    Cognitive Function Screening:       No data to display              Cognitive Function Screening Total - Less than 4 = Abnormal,  Greater than or equal to 4 = Normal    MEDICATIONS:  High Risk Medications:  Total Active Medications: 0  This patient does not have an active medication from one of the medication groupers.    WHAT MATTERS MOST:  Advance Care  "Planning   ACP Status:   Patient has had an ACP conversation  Living Will: Yes  Power of : Yes  LaPOST: No    What is most important right now is to focus on remaining as independent as possiblesymptom/pain control    Accordingly, we have decided that the best plan to meet the patient's goals includes continuing with treatmentenrolling in hospice care      What matters most to patient today is: today             Review of Systems [Negative unless checked off]    General ROS: []fever, []chills, [x]weight loss, [x]malaise/fatigue.  ENT ROS: []congestion, []rhinorrhea,  []sore throat, []neck pain, []hearing loss.  Ophthalmic ROS:[]blurry vision, [] double vision, []photophobia, []eye pain.  Respiratory ROS: []cough, []pleuritic chest pain, [x]shortness of breath, []wheezing.  CVS ROS:[]chest pain, [x]dyspnea on exertion, []palpitations, []orthopnea, []leg swelling, []PND.   GI ROS: []nausea, []vomiting, [] epigastric pain, []abd pain, []diarrhea, []constipation, []blood/melena in stool.   Urology ROS:[]dysuria, []frequency, []flank pain,[] trouble voiding, [] hematuria.   MSK ROS: []myalgias, []joint pain, []muscular weakness,  []back pain, [] falls.   Derm ROS: []pruritis, []rash, []jaundice.  Neurological:[x]dizziness,[]numbness,[]loss of consciousness, []weakness  []headaches.   Psych ROS: []hallucinations, []depression, []anxiety, []suicidal ideation.    Physical Examination  LMP 12/07/1972   Wt Readings from Last 3 Encounters:   02/05/24 52.6 kg (116 lb)   08/07/23 52.9 kg (116 lb 10 oz)   06/15/23 52.6 kg (116 lb)     BP Readings from Last 3 Encounters:   02/08/24 (!) 124/58   08/07/23 136/60   06/15/23 (!) 192/78     Estimated body mass index is 23.42 kg/m² as calculated from the following:    Height as of 2/5/24: 4' 11.02" (1.499 m).    Weight as of 2/5/24: 52.6 kg (116 lb).     General appearance: alert, cooperative, no distress. Pale and weak.  Eyes: pupils equal and reactive, extraocular eye " movements intact   Ears: bilateral TM's and external ear canals normal   Nose: normal and patent, no erythema, discharge or polyps   Sinuses: Normal paranasal sinuses without tenderness   Throat: mucous membranes moist, pharynx normal without lesions   Neck: no thyromegaly, trachea midline  Lungs: clear to auscultation, no wheezes, rales or rhonchi, symmetric air entry, no dullness to percussion bilaterally.  Heart: normal rate, regular rhythm, normal S1, S2, no murmurs, rubs, clicks or gallops, no displacement of the PMI.  Abdomen: soft, nontender, nondistended, no masses or organomegaly No rigidity, rebound, or guarding.   Back: full range of motion, no tenderness, palpable spasm or pain on motion   Extremities: peripheral pulses normal, no pedal edema, no clubbing or cyanosis   Feet: warm, good capillary refill.  Neurological:alert, oriented, normal speech, no focal findings or movement disorder noted   Psychiatric: alert, oriented to person, place, and time  Integument: normal coloration and turgor, no rashes, no suspicious skin lesions noted.    Data reviewed    Previous medical records reviewed and summarized above in HPI. 274}    Laboratory    I have reviewed old labs below:   274}  Lab Results   Component Value Date    WBC 4.95 02/08/2024    HGB 5.9 (LL) 02/08/2024    HCT 19.1 (LL) 02/08/2024    MCV 77 (L) 02/08/2024     02/08/2024     02/08/2024    K 3.7 02/08/2024     02/08/2024    CALCIUM 8.0 (L) 02/08/2024    PHOS 2.2 (L) 02/08/2024    CO2 22 (L) 02/08/2024    GLU 93 02/08/2024    BUN 10 02/08/2024    CREATININE 0.7 02/08/2024    ANIONGAP 7 (L) 02/08/2024    ESTGFRAFRICA >60.0 07/06/2022    EGFRNONAA >60.0 07/06/2022    PROT 5.6 (L) 02/08/2024    ALBUMIN 2.8 (L) 02/08/2024    BILITOT 0.3 02/08/2024    ALKPHOS 55 02/08/2024     (H) 02/08/2024    AST 87 (H) 02/08/2024    INR 1.0 05/06/2018    CHOL 158 01/27/2024    TRIG 81 01/27/2024    HDL 62 01/27/2024    LDLCALC 79.8 01/27/2024     TSH 1.532 10/22/2017    HGBA1C 5.4 10/23/2017       Imaging/Tracing: I have reviewed the pertinent results/findings and my personal findings are below:  274}    Assessment/Plan     274}    Brie Han is a 89 y.o. female who presents to clinic with:    1. Adenocarcinoma of cecum    2. Iron deficiency anemia due to chronic blood loss    3. Sepsis, due to unspecified organism, unspecified whether acute organ dysfunction present    4. Transaminitis         Diagnostic impression remarks       1. Adenocarcinoma of cecum  - Ambulatory referral/consult to Outpatient Case Management    2. Iron deficiency anemia due to chronic blood loss    3. Sepsis, due to unspecified organism, unspecified whether acute organ dysfunction present    4. Transaminitis      Medication Monitoring: In today's visit, monitoring for drug toxicity was accomplished. Proper use of medications was discussed.     Counseling: Counseling included discussion regarding imaging findings, diagnosis, possibilities, treatment options, medications, risks, and benefits. She had many questions regarding the options and long-term effects. All questions were answered. She expressed understanding after counseling regarding the diagnosis and recommendations. She was capable and demonstrated competence with understanding of these options. Shared decision making was performed resulting in her choosing the current treatment plan.     She was counseled about the importance of healthy dietary habits as well as routine physical activity and exercise for better health outcomes. I also discussed the importance of cancer screening.     I also discussed the importance of close follow up to discuss labs, change or modify her medications if needed, monitor side effects, and further evaluation of medical problems.     Additional workup planned: see labs ordered below.    See below for labs and meds ordered with associated diagnosis      Medication List with  "Changes/Refills   Current Medications    ACETAMINOPHEN (TYLENOL) 325 MG TABLET    Take 2 tablets (650 mg total) by mouth every 8 (eight) hours as needed.    B COMPLEX VITAMINS TABLET    Take 1 tablet by mouth once daily.    CALCIUM CITRATE-VITAMIN D3 315-200 MG (CITRACAL+D) 315 MG-5 MCG (200 UNIT) PER TABLET    Take 1 tablet by mouth once daily.    DORZOLAMIDE-TIMOLOL 2-0.5% (COSOPT) 22.3-6.8 MG/ML OPHTHALMIC SOLUTION    Place 1 drop into both eyes every 12 (twelve) hours.    HYDROCORTISONE 2.5 % CREAM    Apply topically every evening.    IRON SUCROSE 200 MG IRON/10 ML SOLN INJECTION    Inject 10 mLs (200 mg total) into the vein once daily. for 8 days    LATANOPROST 0.005 % OPHTHALMIC SOLUTION    Place 1 drop into both eyes once daily.    BLHUXNOPX-U3-UPX09-ALGAL OIL (METANX, ALGAL OIL,) 3 MG-35 MG-2 MG -90.314 MG CAP    Take 1 tablet by mouth once daily.    LOSARTAN (COZAAR) 100 MG TABLET    Take 1 tablet (100 mg total) by mouth once daily.    LOVASTATIN (MEVACOR) 10 MG TABLET    Take 1 tablet (10 mg total) by mouth every evening.    MULTIVITAMIN CAPSULE    Take 1 capsule by mouth once daily.    NIFEDIPINE (PROCARDIA-XL) 30 MG (OSM) 24 HR TABLET    Take 1 tablet (30 mg total) by mouth once daily.     Modified Medications    No medications on file   She has declined further cancer therapy.    No follow-ups on file. for further workup and reassessment if labs and tests obtained are stable or sooner as needed. She was instructed to call the clinic or go to the emergency department if her symptoms do not improve, worsens, or if new symptoms develop. Patient knows to call any time if an emergency arises. Shared decision making occurred and she verbalized understanding in agreement with this plan.     Documentation entered by me for this encounter may have been done in part using speech-recognition technology. Although I have made an effort to ensure accuracy, "sound like" errors may exist and should be interpreted in " context.       Colten Gould MD     Discussed health maintenance guidelines appropriate for age.

## 2024-04-08 NOTE — PHYSICAL THERAPY INITIAL EVALUATION ADULT - STANDING BALANCE: DYNAMIC, REHAB EVAL
fair balance
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
## 2024-12-30 NOTE — ED ADULT TRIAGE NOTE - HEIGHT IN CM
Contacted PT and informed her we had a cardiac clearance request for [facility] and she stated she has cece pt with another cardiologist and declined a appointment     152.4

## 2025-02-05 NOTE — DISCHARGE NOTE ADULT - NS AS DC FU [facility] EJECTION FRACTION >40 %
[facility]                                 Pulmonary/Critical care  Consult /Progress Note :                                                                  CC :worsening SOB     Patient is being seen at the request of Dr Rivera for a consultation for Acute COPD exacerbation    Subjective   Still with SOB and FRAUSTO  6 L   SOB has better however he sat less than 80  Needed BiPAP   No fever or chills    PHYSICAL EXAM:  Vitals:    01/31/25 0900   BP: 97/68   Pulse: 73   Resp: 23   Temp:    SpO2: 90%     Gen: No distress.   Eyes: PERRL. No sclera icterus. No conjunctival injection.   ENT: No discharge. Pharynx clear.   Neck: Trachea midline. No obvious mass.    Resp: No accessory muscle use. No crackles. No wheezes. No rhonchi. No dullness on percussion.  CV: Regular rate. Regular rhythm. No murmur or rub. No edema. Peripheral pulses are 2+.  Capillary refill is less than 3 seconds.  GI: Non-tender. Non-distended. No hernia.   Skin: Warm and dry. No nodule on exposed extremities.   Lymph: No cervical LAD. No supraclavicular LAD.   M/S: No cyanosis. No joint deformity. No clubbing.   Neuro: Awake. Alert. Moves all four extremities.   Psych: Oriented x 3. No anxiety.     LABS:  CBC:   Recent Labs     01/29/25 0449 01/30/25 0428 01/31/25 0426   WBC 3.4* 9.2 7.2   HGB 14.5 13.8 14.3   HCT 43.9 41.1 43.4   MCV 92.0 92.0 92.6    269 292     BMP:   Recent Labs     01/29/25 0449 01/30/25 0428 01/31/25 0426    136 135*   K 4.3 4.5 5.0   CL 99 100 100   CO2 27 24 26   BUN 15 23* 21*   CREATININE 1.0 0.7* 0.8     LIVER PROFILE:   Recent Labs     01/29/25 0449 01/30/25 0428 01/31/25 0426   AST 26 24 23   ALT 16 15 16   BILITOT 0.3 <0.2 0.4   ALKPHOS 51 45 44       Microbiology:  Flu +    Imaging:  Chest imaging was reviewed by me and showed secere emphysema    ASSESSMENT:   Flu A  Acute Hypoxic resp   Acute copd                                                    [facility]                                 Pulmonary/Critical care  Consult /Progress Note :                                                                  CC :worsening SOB     Patient is being seen at the request of Dr Rivera for a consultation for Acute COPD exacerbation    Subjective   Still with SOB and FRAUSTO  Get up to chair but become very SOB   On 6 L   \\use BiPAP   No fever or chills    PHYSICAL EXAM:  Vitals:    02/02/25 0800   BP: 114/87   Pulse: 86   Resp: 21   Temp:    SpO2: (!) 88%     Gen: No distress.   Eyes: PERRL. No sclera icterus. No conjunctival injection.   ENT: No discharge. Pharynx clear.   Neck: Trachea midline. No obvious mass.    Resp: No accessory muscle use. No crackles. No wheezes. No rhonchi. No dullness on percussion.  CV: Regular rate. Regular rhythm. No murmur or rub. No edema. Peripheral pulses are 2+.  Capillary refill is less than 3 seconds.  GI: Non-tender. Non-distended. No hernia.   Skin: Warm and dry. No nodule on exposed extremities.   Lymph: No cervical LAD. No supraclavicular LAD.   M/S: No cyanosis. No joint deformity. No clubbing.   Neuro: Awake. Alert. Moves all four extremities.   Psych: Oriented x 3. No anxiety.     LABS:  CBC:   Recent Labs     01/31/25  0426   WBC 7.2   HGB 14.3   HCT 43.4   MCV 92.6        BMP:   Recent Labs     01/31/25  0426   *   K 5.0      CO2 26   BUN 21*   CREATININE 0.8     LIVER PROFILE:   Recent Labs     01/31/25  0426   AST 23   ALT 16   BILITOT 0.4   ALKPHOS 44       Microbiology:  Flu +    Imaging:  Chest imaging was reviewed by me and showed secere emphysema    ASSESSMENT:   Flu A  Acute Hypoxic resp   Acute copd exacerbation       PLAN:  Keep Doxy   Low pressure BiPAP ,used at night   Linda flu X 5   O2 goal 92-95   *-IV steroids ,wean To PO when feel better ,and taper over 7 days   *-IV abx Rocephin and Azithromycin Day 1                                                    [facility]                                 Pulmonary/Critical care  Consult /Progress Note :                                                                  CC :worsening SOB     Patient is being seen at the request of Dr Rivera for a consultation for Acute COPD exacerbation    Subjective   Still with SOB and FRAUSTO  On 6 L   SOB has better however he sat less than 80  Needed BiPAP at night   No fever or chills    PHYSICAL EXAM:  Vitals:    02/01/25 1031   BP:    Pulse: 67   Resp: 19   Temp:    SpO2: 91%     Gen: No distress.   Eyes: PERRL. No sclera icterus. No conjunctival injection.   ENT: No discharge. Pharynx clear.   Neck: Trachea midline. No obvious mass.    Resp: No accessory muscle use. No crackles. No wheezes. No rhonchi. No dullness on percussion.  CV: Regular rate. Regular rhythm. No murmur or rub. No edema. Peripheral pulses are 2+.  Capillary refill is less than 3 seconds.  GI: Non-tender. Non-distended. No hernia.   Skin: Warm and dry. No nodule on exposed extremities.   Lymph: No cervical LAD. No supraclavicular LAD.   M/S: No cyanosis. No joint deformity. No clubbing.   Neuro: Awake. Alert. Moves all four extremities.   Psych: Oriented x 3. No anxiety.     LABS:  CBC:   Recent Labs     01/30/25  0428 01/31/25 0426   WBC 9.2 7.2   HGB 13.8 14.3   HCT 41.1 43.4   MCV 92.0 92.6    292     BMP:   Recent Labs     01/30/25  0428 01/31/25 0426    135*   K 4.5 5.0    100   CO2 24 26   BUN 23* 21*   CREATININE 0.7* 0.8     LIVER PROFILE:   Recent Labs     01/30/25  0428 01/31/25  0426   AST 24 23   ALT 15 16   BILITOT <0.2 0.4   ALKPHOS 45 44       Microbiology:  Flu +    Imaging:  Chest imaging was reviewed by me and showed secere emphysema    ASSESSMENT:   Flu A  Acute Hypoxic resp   Acute copd exacerbation       PLAN:  Keep Doxy   Low pressure BiPAP ,used at night   Linda flu X 5   O2 goal 92-95     01/28/25 1849   NIV Type   $NIV $Daily Charge   NIV Started/Stopped (S)  On   Equipment Type v60   Mode Bilevel   Mask Type Full face mask   Mask Size Small   Assessment   Pulse 79   Respirations 27   SpO2 96 %   Level of Consciousness 0   Comfort Level Good   Using Accessory Muscles No   Mask Compliance Good   Skin Assessment Clean, dry, & intact   Skin Protection for O2 Device Yes   Settings/Measurements   IPAP 18 cmH20   CPAP/EPAP 8 cmH2O   Vt (Measured) 839 mL   Rate Ordered 16   FiO2  30 %   Minute Volume (L/min) 22.4 Liters   Mask Leak (lpm) 44 lpm   Patient's Home Machine No   Alarm Settings   Alarms On Y           01/30/25 0302   NIV Type   NIV Started/Stopped On   Equipment Type V60   Mode Bilevel   Mask Type Full face mask   Mask Size Small   Assessment   Pulse 71   Respirations 18   /67   SpO2 93 %   Level of Consciousness 0   Comfort Level Good   Using Accessory Muscles No   Mask Compliance Good   Skin Assessment Clean, dry, & intact   Skin Protection for O2 Device Yes   Orientation Middle   Location Nose   Intervention(s) Skin Barrier   Settings/Measurements   PIP Observed 18 cm H20   IPAP 18 cmH20   CPAP/EPAP 8 cmH2O   Vt (Measured) 656 mL   Rate Ordered 16   FiO2  30 %   Minute Volume (L/min) 17.6 Liters   Mask Leak (lpm) 33 lpm   Patient's Home Machine No   Alarm Settings   Alarms On Y   Oxygen Therapy/Pulse Ox   O2 Therapy Oxygen   O2 Device PAP (positive airway pressure)   Pulse Oximeter Device Mode Continuous   Pulse Oximeter Device Location Finger           01/30/25 1932   NIV Type   NIV Started/Stopped On   Equipment Type V60   Mode Bilevel   Mask Type Nasal mask   Assessment   Pulse 70   Respirations 19   SpO2 92 %   Settings/Measurements   PIP Observed 15 cm H20   IPAP 18 cmH20   CPAP/EPAP 8 cmH2O   Vt (Measured) 672 mL   Rate Ordered 16   FiO2  30 %   Minute Volume (L/min) 22.3 Liters   Mask Leak (lpm) 71 lpm   Patient's Home Machine No   Alarm Settings   Alarms On Y   Low Pressure (cmH2O) 5 cmH2O   High Pressure (cmH2O) 30 cmH2O   RR Low (bpm) 14   RR High (bpm) 40 br/min           01/30/25 2243   NIV Type   NIV Started/Stopped On   Equipment Type V60   Mode Bilevel   Mask Type Nasal mask   Mask Size Small   Assessment   Pulse 69   Respirations 25   SpO2 91 %   Level of Consciousness 0   Comfort Level Good   Using Accessory Muscles Yes   Mask Compliance Good   Skin Assessment Clean, dry, & intact   Skin Protection for O2 Device N/A   Settings/Measurements   PIP Observed 14 cm H20   IPAP 18 cmH20   CPAP/EPAP 8 cmH2O   Vt (Measured) 697 mL   Rate Ordered 16   FiO2  30 %   Minute Volume (L/min) 16.8 Liters   Mask Leak (lpm) 54 lpm   Patient's Home Machine No   Alarm Settings   Alarms On Y   Low Pressure (cmH2O) 5 cmH2O   High Pressure (cmH2O) 30 cmH2O   RR Low (bpm) 14   RR High (bpm) 40 br/min   Oxygen Therapy/Pulse Ox   O2 Therapy Oxygen   O2 Device PAP (positive airway pressure)   Pulse Oximeter Device Mode Continuous   Pulse Oximeter Device Location Finger           01/31/25 1900   RT Protocol   History Pulmonary Disease 2   Respiratory pattern 4   Breath sounds 6   Cough 0   Indications for Bronchodilator Therapy Wheezing associated with pulm disorder   Bronchodilator Assessment Score 12     RT Inhaler-Nebulizer Bronchodilator Protocol Note    There is a bronchodilator order in the chart from a provider indicating to follow the RT Bronchodilator Protocol and there is an “Initiate RT Inhaler-Nebulizer Bronchodilator Protocol” order as well (see protocol at bottom of note).    CXR Findings:  No results found.    The findings from the last RT Protocol Assessment were as follows:   History Pulmonary Disease: Chronic pulmonary disease  Respiratory Pattern: Mild dyspnea at rest, irregular pattern, or RR 21-25 bpm  Breath Sounds: Inspiratory and expiratory or bilateral wheezing and/or rhonchi  Cough: Strong, spontaneous, non-productive  Indication for Bronchodilator Therapy: Wheezing associated with pulm disorder  Bronchodilator Assessment Score: 12    Aerosolized bronchodilator medication orders have been revised according to the RT Inhaler-Nebulizer Bronchodilator Protocol below.    Respiratory Therapist to perform RT Therapy Protocol Assessment initially then follow the protocol.  Repeat RT Therapy Protocol Assessment PRN for score 0-3 or on second treatment, BID, and PRN for scores above 3.    No Indications - adjust the frequency to every 6 hours PRN wheezing or bronchospasm, if no treatments needed after 48 hours then discontinue using Per Protocol order mode.     If indication present, adjust the RT bronchodilator orders based on the Bronchodilator Assessment Score as indicated below.  Use Inhaler orders unless patient has one or more of the following: on home nebulizer, not able to hold breath for 10 seconds, is not alert and oriented, cannot activate and use MDI correctly, or respiratory rate 25 breaths per minute or more, then use the equivalent nebulizer order(s) with     01/31/25 2316   NIV Type   Equipment Type v60   Mode Bilevel   Mask Type Under the nose   Mask Size   (c)   Assessment   Pulse 70   Respirations 19   SpO2 94 %   Settings/Measurements   IPAP 10 cmH20   CPAP/EPAP 5 cmH2O   Vt (Measured) 826 mL   Rate Ordered 12   FiO2  30 %   Minute Volume (L/min) 19.6 Liters   Mask Leak (lpm) 39 lpm   Patient's Home Machine No   Patient Observation   Patient Observations spo2 92% on 30% bipap           02/01/25 0315   NIV Type   NIV Started/Stopped On   Equipment Type v60   Mode Bilevel   Mask Type Full face mask   Assessment   Pulse 67   Respirations 24   SpO2 95 %   Settings/Measurements   IPAP 10 cmH20   CPAP/EPAP 5 cmH2O   Vt (Measured) 637 mL   Rate Ordered 12   FiO2  30 %   I Time/ I Time % 0.9 s   Minute Volume (L/min) 16 Liters   Mask Leak (lpm) 39 lpm   Patient's Home Machine No           02/01/25 2000   RT Protocol   History Pulmonary Disease 2   Respiratory pattern 4   Breath sounds 4   Cough 1   Indications for Bronchodilator Therapy Wheezing associated with pulm disorder   Bronchodilator Assessment Score 11     RT Inhaler-Nebulizer Bronchodilator Protocol Note    There is a bronchodilator order in the chart from a provider indicating to follow the RT Bronchodilator Protocol and there is an “Initiate RT Inhaler-Nebulizer Bronchodilator Protocol” order as well (see protocol at bottom of note).    CXR Findings:  No results found.    The findings from the last RT Protocol Assessment were as follows:   History Pulmonary Disease: Chronic pulmonary disease  Respiratory Pattern: Mild dyspnea at rest, irregular pattern, or RR 21-25 bpm  Breath Sounds: Intermittent or unilateral wheezes  Cough: Strong, productive  Indication for Bronchodilator Therapy: Wheezing associated with pulm disorder  Bronchodilator Assessment Score: 11    Aerosolized bronchodilator medication orders have been revised according to the RT Inhaler-Nebulizer Bronchodilator Protocol below.    Respiratory Therapist to perform RT Therapy Protocol Assessment initially then follow the protocol.  Repeat RT Therapy Protocol Assessment PRN for score 0-3 or on second treatment, BID, and PRN for scores above 3.    No Indications - adjust the frequency to every 6 hours PRN wheezing or bronchospasm, if no treatments needed after 48 hours then discontinue using Per Protocol order mode.     If indication present, adjust the RT bronchodilator orders based on the Bronchodilator Assessment Score as indicated below.  Use Inhaler orders unless patient has one or more of the following: on home nebulizer, not able to hold breath for 10 seconds, is not alert and oriented, cannot activate and use MDI correctly, or respiratory rate 25 breaths per minute or more, then use the equivalent nebulizer order(s) with same Frequency and PRN reasons based on the     02/01/25 2337   NIV Type   Equipment Type v60   Mode Bilevel   Mask Type Under the nose   Mask Size   (c)   Assessment   Pulse 66   Respirations 22   SpO2 96 %   Settings/Measurements   IPAP 10 cmH20   CPAP/EPAP 5 cmH2O   Vt (Measured) 730 mL   Rate Ordered 12   FiO2  50 %   Minute Volume (L/min) 17.9 Liters   Mask Leak (lpm) 40 lpm   Patient's Home Machine No   Patient Observation   Patient Observations spo2 95% on 50% bipap           02/02/25 0310   NIV Type   Equipment Type v60   Mode Bilevel   Mask Type Under the nose   Mask Size   (c)   Assessment   Pulse 54   SpO2 97 %   Settings/Measurements   IPAP 10 cmH20   CPAP/EPAP 5 cmH2O   Vt (Measured) 560 mL   Rate Ordered 12   FiO2  50 %   Minute Volume (L/min) 11.9 Liters   Mask Leak (lpm) 42 lpm   Patient's Home Machine No   Patient Observation   Patient Observations spo2 97% on 50% bipap           02/02/25 4607   NIV Type   NIV Started/Stopped (S)  On   Equipment Type v60   Mode Bilevel   Mask Type Under the nose   Settings/Measurements   IPAP 10 cmH20   CPAP/EPAP 5 cmH2O   Vt (Measured) 515 mL   Rate Ordered 12   FiO2  50 %   Minute Volume (L/min) 12.5 Liters   Mask Leak (lpm) 45 lpm   Patient's Home Machine No   Alarm Settings   Alarms On Y   Low Pressure (cmH2O) 5 cmH2O   High Pressure (cmH2O) 30 cmH2O   Delay Alarm 20 sec(s)   RR Low (bpm) 12   RR High (bpm) 40 br/min           02/03/25 0252   NIV Type   NIV Started/Stopped On   Equipment Type v60   Mode Bilevel   Mask Type Under the nose   Settings/Measurements   IPAP 10 cmH20   CPAP/EPAP 5 cmH2O   Vt (Measured) 490 mL   Rate Ordered 12   FiO2  50 %   I Time/ I Time % 0.9 s   Minute Volume (L/min) 11 Liters   Mask Leak (lpm) 20 lpm   Patient's Home Machine No           02/04/25 2242   NIV Type   NIV Started/Stopped (S)  On   Equipment Type V60   Mode Bilevel   Mask Type Under the nose   Mask Size Small   Assessment   Pulse 84   Respirations 22   SpO2 95 %   Comfort Level Good   Using Accessory Muscles No   Mask Compliance Good   Skin Assessment Clean, dry, & intact   Skin Protection for O2 Device N/A   Settings/Measurements   IPAP 10 cmH20   CPAP/EPAP 5 cmH2O   Vt (Measured) 784 mL   Rate Ordered 12   FiO2  50 %   Minute Volume (L/min) 17.6 Liters   Mask Leak (lpm) 31 lpm   Patient's Home Machine No   Alarm Settings   Alarms On Y           02/05/25 0255   NIV Type   Equipment Type V60   Mode Bilevel   Mask Type Under the nose   Mask Size Small   Assessment   Pulse 60   Respirations 20   SpO2 96 %   Settings/Measurements   IPAP 10 cmH20   CPAP/EPAP 5 cmH2O   Vt (Measured) 537 mL   Rate Ordered 12   FiO2  50 %   Minute Volume (L/min) 10.5 Liters   Mask Leak (lpm) 34 lpm   Patient's Home Machine No   Alarm Settings   Alarms On Y           02/05/25 0505   Oxygen Therapy/Pulse Ox   Blood Gas  Performed? Yes   $ABG $Arterial Puncture   Keshawn's Test #1 Pos   Site #1 Right Radial   Site Prepped #1 Yes   Number of Attempts #1 1   Pressure Held #1 Yes   Complications #1 None   Post-procedure #1 Standard   Specimen Status #1 To lab   How Tolerated? Tolerated well        4 Eyes Skin Assessment     NAME:  Sam Terrazas  YOB: 1963  MEDICAL RECORD NUMBER:  [id number]    The patient is being assessed for  Shift Handoff    I agree that at least one RN has performed a thorough Head to Toe Skin Assessment on the patient. ALL assessment sites listed below have been assessed.      Areas assessed by both nurses:    Head, Face, Ears, Shoulders, Back, Chest, Arms, Elbows, Hands, Sacrum. Buttock, Coccyx, Ischium, Legs. Feet and Heels, and Under Medical Devices         Does the Patient have a Wound? No noted wound(s)       Jose Prevention initiated by RN: Yes  Wound Care Orders initiated by RN: No    Pressure Injury (Stage 3,4, Unstageable, DTI, NWPT, and Complex wounds) if present, place Wound referral order by RN under : No    New Ostomies, if present place, Ostomy referral order under : No     Nurse 1 eSignature: Electronically signed by Kip March RN on 2/2/25 at 5:24 PM EST    **SHARE this note so that the co-signing nurse can place an eSignature**    Nurse 2 eSignature: Electronically signed by Katlin Medeiros RN on 2/2/25 at 5:24 PM EST     4 Eyes Skin Assessment     NAME:  Sam Terrazas  YOB: 1963  MEDICAL RECORD NUMBER:  [id number]    The patient is being assessed for  Admission    I agree that at least one RN has performed a thorough Head to Toe Skin Assessment on the patient. ALL assessment sites listed below have been assessed.      Areas assessed by both nurses:    Head, Face, Ears, Shoulders, Back, Chest, Arms, Elbows, Hands, Sacrum. Buttock, Coccyx, Ischium, Legs. Feet and Heels, and Under Medical Devices         Does the Patient have a Wound? No noted wound(s)       Jose Prevention initiated by RN: Yes  Wound Care Orders initiated by RN: Yes    Pressure Injury (Stage 3,4, Unstageable, DTI, NWPT, and Complex wounds) if present, place Wound referral order by RN under : No    New Ostomies, if present place, Ostomy referral order under : No     Nurse 1 eSignature: Electronically signed by Daniel Gauthier RN on 1/29/25 at 3:49 AM EST    **SHARE this note so that the co-signing nurse can place an eSignature**    Nurse 2 eSignature: Electronically signed by Jus Heredia RN on 1/29/25 at 5:36 AM EST   Patient is able to demonstrate the ability to move from a reclining position to an upright position within the recliner.    ABG drawn x 1 attempt(s) from right radial artery.  Patient had positive modified Keshawn's Test.  Patient was on 50%  (LPM/Fio2) oxygen per bipap (device).  Pressure held x 10 minutes.  No bleeding or bruising noted at puncture site.  Patient tolerated procedure well.          ABG WAS DRAWN WHILE ON 50% FIO2 AFTER WEARING BIPAP DURING SLEEP AT SETTINGS OF IPAP 10,  EPAP 5  AND BACK UP RATE OF 12.  WORE BIPAP FOR 6 HOURS AND 20 MINUTES.    AM care rounds completed. Discussed POC. Pt he felt the bipap was helping him at night. Asked if he had a cipap at home, stated no. Relayed information during rounds and how pt may benefit.    Admit: 2025    Name:  Sam Terrazas  Room:  /0323-01  MRN:    [id number]    Critical Care Daily Progress Note for 2025   Admitted with acute COPD exacerbation acute on chronic respiratory failure    Interval History:      - overall feels  better but still has some shortness of breath.     Scheduled Meds:   predniSONE  40 mg Oral Daily    ipratropium 0.5 mg-albuterol 2.5 mg  1 Dose Inhalation 4x Daily RT    insulin lispro  0-4 Units SubCUTAneous 4x Daily AC & HS    aspirin  81 mg Oral Daily    buPROPion  150 mg Oral BID    budesonide-formoterol  2 puff Inhalation BID RT    And    tiotropium  2 puff Inhalation Daily RT    sodium chloride flush  10 mL IntraVENous 2 times per day    enoxaparin  40 mg SubCUTAneous Daily       Continuous Infusions:   dextrose      sodium chloride         PRN Meds:  glucose, dextrose bolus **OR** dextrose bolus, glucagon (rDNA), dextrose, sodium chloride flush, sodium chloride, promethazine **OR** ondansetron, melatonin, nicotine, acetaminophen **OR** acetaminophen, polyethylene glycol, albuterol         Objective:     Temp  Av.7 °F (36.5 °C)  Min: 97.5 °F (36.4 °C)  Max: 97.9 °F (36.6 °C)  Pulse  Av.2  Min: 72  Max: 76  BP  Min: 93/71  Max: 120/82  SpO2  Av.7 %  Min: 91 %  Max: 97 %  Patient Vitals for the past 4 hrs:   BP Temp Temp src Pulse Resp SpO2   25 0803 -- -- -- -- -- 92 %   25 0735 106/83 97.7 °F (36.5 °C) Oral 76 22 94 %         Intake/Output Summary (Last 24 hours) at 2025 0922  Last data filed at 2025 0915  Gross per 24 hour   Intake 240 ml   Output 925 ml   Net -685 ml       Physical Exam:    General appearance:  no acute distress, appears older than stated age  HEENT:   atraumatic, sclera anicteric, Conjunctivae clear.  Neck: Supple,Trachea midline, no goiter  Respiratory:no accessory muscle usage, Normal respiratory effort.  Minimal Wheezing +  Cardiovascular:  Regular rate and rhythm, capillary refill 2  Admit: 2025    Name:  Sam Terrazas  Room:  [address] [place] [address]  MRN:    [id number]    Critical Care Daily Progress Note for 2025   Admitted with acute COPD exacerbation acute on chronic respiratory failure  Interval History:     Used BiPAP last night  Now on 6  L of O2  Short of breath at rest   Scheduled Meds:   ipratropium 0.5 mg-albuterol 2.5 mg  1 Dose Inhalation 4x Daily RT    mupirocin   Each Nostril BID    oseltamivir  75 mg Oral BID    doxycycline hyclate  100 mg Oral 2 times per day    methylPREDNISolone  40 mg IntraVENous Q12H    insulin lispro  0-4 Units SubCUTAneous 4x Daily AC & HS    aspirin  81 mg Oral Daily    buPROPion  150 mg Oral BID    budesonide-formoterol  2 puff Inhalation BID RT    And    tiotropium  2 puff Inhalation Daily RT    sodium chloride flush  10 mL IntraVENous 2 times per day    enoxaparin  40 mg SubCUTAneous Daily       Continuous Infusions:   dextrose      sodium chloride         PRN Meds:  glucose, dextrose bolus **OR** dextrose bolus, glucagon (rDNA), dextrose, sodium chloride flush, sodium chloride, promethazine **OR** ondansetron, melatonin, nicotine, acetaminophen **OR** acetaminophen, polyethylene glycol, albuterol                  Objective:     Temp  Av °F (36.7 °C)  Min: 97.6 °F (36.4 °C)  Max: 98.6 °F (37 °C)  Pulse  Av.1  Min: 58  Max: 79  BP  Min: 84/36  Max: 135/79  SpO2  Av.7 %  Min: 89 %  Max: 95 %  FiO2   Av %  Min: 30 %  Max: 30 %  Patient Vitals for the past 4 hrs:   BP Temp Temp src Pulse Resp SpO2 Weight   25 0720 -- 97.6 °F (36.4 °C) Oral -- -- -- --   25 0700 112/74 -- -- 67 24 92 % --   25 0644 -- -- -- 71 22 95 % --   25 0600 99/85 -- -- 61 20 93 % 69.5 kg (153 lb 3.2 oz)   25 0500 (!) 84/36 -- -- 58 24 92 % --   25 0400 (!) 101/90 97.6 °F (36.4 °C) Axillary 72 24 91 % --         Intake/Output Summary (Last 24 hours) at 2025 0754  Last data filed at 2025 0400  Gross per 24 hour   Intake  Admit: 2025    Name:  Sam Terrazas  Room[facility]  [facility]  MRN:    [id number]    Critical Care Daily Progress Note for 2025   Admitted with acute COPD exacerbation acute on chronic respiratory failure  Interval History:   Patient did not tolerate BiPAP  Currently requiring 5 L of oxygen  Feels better  Scheduled Meds:   ipratropium 0.5 mg-albuterol 2.5 mg  1 Dose Inhalation 4x Daily RT    aspirin  81 mg Oral Daily    buPROPion  150 mg Oral BID    budesonide-formoterol  2 puff Inhalation BID RT    And    tiotropium  2 puff Inhalation Daily RT    sodium chloride flush  10 mL IntraVENous 2 times per day    enoxaparin  40 mg SubCUTAneous Daily    predniSONE  40 mg Oral Daily       Continuous Infusions:   sodium chloride         PRN Meds:  sodium chloride flush, sodium chloride, promethazine **OR** ondansetron, melatonin, nicotine, acetaminophen **OR** acetaminophen, polyethylene glycol, albuterol                  Objective:     Temp  Av.7 °F (36.5 °C)  Min: 97.4 °F (36.3 °C)  Max: 98 °F (36.7 °C)  Pulse  Av.4  Min: 58  Max: 83  BP  Min: 95/71  Max: 118/75  SpO2  Av.2 %  Min: 93 %  Max: 98 %  FiO2   Av %  Min: 30 %  Max: 30 %  Patient Vitals for the past 4 hrs:   BP Temp Temp src Pulse Resp SpO2   25 0730 -- -- -- 65 22 93 %   25 0729 -- -- -- 66 21 93 %   25 0728 -- -- -- 63 24 97 %   25 0708 -- 97.4 °F (36.3 °C) -- -- -- --   25 0700 104/73 -- -- 65 24 97 %   25 0600 108/73 -- -- 58 20 95 %   25 0500 108/73 -- -- 59 16 95 %   25 0400 109/68 97.5 °F (36.4 °C) Temporal 64 27 95 %       No intake or output data in the 24 hours ending 25 0747    Physical Exam:    General appearance:  mild acute distress, appears older than stated age  HEENT:   atraumatic, sclera anicteric, Conjunctivae clear.  Neck: Supple,Trachea midline, no goiter  Respiratory:minimal accessory muscle usage, Normal respiratory effort.  Wheezing +  Cardiovascular:  Regular  Admit: 2025    Name:  Sam Terrazas  Room:  [facility]3006-  MRN:    [id number]    Critical Care Daily Progress Note for 2025   Admitted with acute COPD exacerbation acute on chronic respiratory failure  Interval History:     Used BiPAP last night  Now on 6  L of O2  Short of breath at rest     2   Used bipap last night again. Doing much better. On 6 L of oxygen. He uses 2 L at baseline.     2/2- intermittent tachycardia. Used Bipap at hs. On 6 L.      Scheduled Meds:   oseltamivir 6mg/ml  75 mg Oral BID    budesonide  0.5 mg Nebulization BID RT    ipratropium 0.5 mg-albuterol 2.5 mg  1 Dose Inhalation 4x Daily RT    mupirocin   Each Nostril BID    doxycycline hyclate  100 mg Oral 2 times per day    methylPREDNISolone  40 mg IntraVENous Q12H    insulin lispro  0-4 Units SubCUTAneous 4x Daily AC & HS    aspirin  81 mg Oral Daily    buPROPion  150 mg Oral BID    budesonide-formoterol  2 puff Inhalation BID RT    And    tiotropium  2 puff Inhalation Daily RT    sodium chloride flush  10 mL IntraVENous 2 times per day    enoxaparin  40 mg SubCUTAneous Daily       Continuous Infusions:   dextrose      sodium chloride         PRN Meds:  glucose, dextrose bolus **OR** dextrose bolus, glucagon (rDNA), dextrose, sodium chloride flush, sodium chloride, promethazine **OR** ondansetron, melatonin, nicotine, acetaminophen **OR** acetaminophen, polyethylene glycol, albuterol         Objective:     Temp  Av.4 °F (36.3 °C)  Min: 96.9 °F (36.1 °C)  Max: 98.1 °F (36.7 °C)  Pulse  Av.8  Min: 54  Max: 89  BP  Min: 84/53  Max: 151/125  SpO2  Av.6 %  Min: 88 %  Max: 98 %  FiO2   Av %  Min: 50 %  Max: 50 %  Patient Vitals for the past 4 hrs:   BP Temp Temp src Pulse Resp SpO2   25 1133 -- -- -- -- 21 --   25 1100 103/68 97.2 °F (36.2 °C) Temporal 64 -- 94 %   25 1000 102/65 -- -- 67 -- 94 %         Intake/Output Summary (Last 24 hours) at 2025 1314  Last data filed at 2025 0800  Gross  Attempt to return call to [facility]. No answer. Will attempt as time allows.     Attempted to collect ABG. Lab rejected test. Writer called RT and requested to collect ABG.    Bedside report and transfer of care given to DIAMOND Duffy. Pt currently resting in bed with the call light within reach. Pt denies any other care needs at this time. Pt stable at this time.     Bedside report and transfer of care given to DIAMOND Garcia. Pt currently resting in bed with the call light within reach. Pt denies any other care needs at this time. Pt stable at this time.     Bedside report and transfer of care given to Robert FIELDS. Pt currently resting in bed with the call light within reach. Pt denies any other care needs at this time. Pt stable at this time.    [facility]   COPD PROGRAM      NAME:  Sam Terrazas  AGE: 61 y.o.   GENDER: male  : 1963  TODAY'S DATE:  2025    Subjective:     VISIT TYPE: Education    ADMIT DATE: 2025    PAST MEDICAL HISTORY:      Diagnosis Date    COPD (chronic obstructive pulmonary disease) ([facility])     Emphysema lung (HCC)     Lung bullae (HCC)      HOME MEDICATIONS:  Prior to Admission medications    Medication Sig Start Date End Date Taking? Authorizing Provider   TRELEGY ELLIPTA 100-62.5-25 MCG/ACT AEPB inhaler INHALE ONE (1) PUFF INTO THE LUNGS DAILY 24  Yes Demetria Davis MD   albuterol sulfate HFA (PROVENTIL;VENTOLIN;PROAIR) 108 (90 Base) MCG/ACT inhaler Inhale 2 puffs into the lungs every 4 hours as needed for Wheezing or Shortness of Breath 23  Yes Kelli Jameson MD   ipratropium 0.5 mg-albuterol 2.5 mg (DUONEB) 0.5-2.5 (3) MG/3ML SOLN nebulizer solution Take 3 mLs by nebulization every 4 hours as needed for Shortness of Breath 23  Yes Kelli Jameson MD   aspirin 81 MG tablet Take 1 tablet by mouth daily   Yes ProviderDavid MD   buPROPion (WELLBUTRIN SR) 150 MG extended release tablet Take 1 tablet by mouth 3/6/24   ProviderDavid MD   nicotine (NICODERM CQ) 21 MG/24HR Place 1 patch onto the skin daily 23  Jenny Herrera PA-C      Objective:     ADMISSION DIAGNOSIS:   Influenza A [J10.1]  Acute exacerbation of chronic obstructive pulmonary disease (COPD) ([facility]) [J44.1]  COPD exacerbation ([facility]) [J44.1]  Acute on chronic respiratory failure with hypoxia and hypercapnia [J96.21, J96.22]    CXR Findings:  No results found.    Assessment:     Patient sitting in bed at this time on  6 L O2.  Pt  with  shortness of breath with movement and cough. Stated at home was having symptoms of acute COPD exacerbation with more coughing than usual, wheezing, increase mucus, medications not helping, + worsening shortness of breath.     Provided COPD Nurse Resource number at  Care rounds completed with Dr. Davis and multidisciplinary team. Reviewed labs, meds, VS, assessment, & plan of care for today. See dictated note and new orders for details.     -add doxy  -start tamiflu   End of shift report given to Florence RN  -  care transferred  -  patient resting in bed w/ oxygen at 6L HFNC talking on phone.  Call light in patient's reach.  Denying needs.   Family at bedside. No change at this time. Call light in reach.    HEART FAILURE CARE PLAN:    Comorbidities Reviewed: Yes   Patient has a past medical history of COPD (chronic obstructive pulmonary disease) (HCC), Emphysema lung (HCC), and Lung bullae (HCC).     Weights Reviewed: Yes   Admission weight: 68 kg (150 lb)   Wt Readings from Last 3 Encounters:   01/31/25 69.5 kg (153 lb 3.2 oz)   08/15/24 69.2 kg (152 lb 9.6 oz)   04/30/24 67 kg (147 lb 9.6 oz)     Intake & Output Reviewed: Yes     Intake/Output Summary (Last 24 hours) at 1/31/2025 1415  Last data filed at 1/31/2025 0937  Gross per 24 hour   Intake 200 ml   Output 800 ml   Net -600 ml       ECHOCARDIOGRAM Reviewed: Yes   Patient's Ejection Fraction (EF) is greater than 40%     Medications Reviewed: Yes   SCHEDULED HOSPITAL MEDICATIONS:   budesonide  0.5 mg Nebulization BID RT    ipratropium 0.5 mg-albuterol 2.5 mg  1 Dose Inhalation 4x Daily RT    mupirocin   Each Nostril BID    oseltamivir  75 mg Oral BID    doxycycline hyclate  100 mg Oral 2 times per day    methylPREDNISolone  40 mg IntraVENous Q12H    insulin lispro  0-4 Units SubCUTAneous 4x Daily AC & HS    aspirin  81 mg Oral Daily    buPROPion  150 mg Oral BID    budesonide-formoterol  2 puff Inhalation BID RT    And    tiotropium  2 puff Inhalation Daily RT    sodium chloride flush  10 mL IntraVENous 2 times per day    enoxaparin  40 mg SubCUTAneous Daily     HOME MEDICATIONS:  Prior to Admission medications    Medication Sig Start Date End Date Taking? Authorizing Provider   LUIS ELLIPTA 100-62.5-25 MCG/ACT AEPB inhaler INHALE ONE (1) PUFF INTO THE LUNGS DAILY 6/5/24  Yes Demetria Davis MD   albuterol sulfate HFA (PROVENTIL;VENTOLIN;PROAIR) 108 (90 Base) MCG/ACT inhaler Inhale 2 puffs into the lungs every 4 hours as needed for Wheezing or Shortness of Breath 7/12/23  Yes Kelli Jameson MD   ipratropium 0.5 mg-albuterol 2.5 mg (DUONEB) 0.5-2.5 (3) MG/3ML SOLN nebulizer solution Take 3 mLs by nebulization every 4 hours as needed for Shortness of Breath  HR 's, decreased back to 80's. Tele strip printed. IM made aware during rounds. No new orders   IM at bedside. Update given    Increased bipap oxygen from 30% to 50% to keep o2 sats greater than 90%   Inpatient Occupational Therapy Evaluation & Treatment    Unit: ICU  Date:  1/30/2025  Patient Name:    Sam Terrazas  Admitting diagnosis:  Influenza A [J10.1]  Acute exacerbation of chronic obstructive pulmonary disease (COPD) (HCC) [J44.1]  COPD exacerbation (HCC) [J44.1]  Acute on chronic respiratory failure with hypoxia and hypercapnia [J96.21, J96.22]  Admit Date:  1/28/2025  Precautions/Restrictions/WB Status/ Lines/ Wounds/ Oxygen: Fall risk, Bed/chair alarm, Telemetry, Continuous pulse oximetry, Isolation Precautions: Droplet, and BIPAP    Treatment Time:  6835-0912  Treatment Number:  1  Timed Code Treatment Minutes: 23 minutes  Total Treatment Minutes:  33  minutes    Patient Goals for Therapy: none stated          Discharge Recommendations: SNF  DME needs for discharge: Defer to facility       Therapy recommendations for staff:   Assist of 1 for sitting EOB     History of Present Illness: Per admission H&P by Dr. Rivera on1/28/25:  \"61 y.o. male who presented to [facility] with past ministry of COPD, presented ED with chief complaint of shortness of breath     Patient reports shortness of breath started 4 days ago progressively worsening came here when severe respiratory distress was placed on BiPAP with improvement of the blood gas.  Patient was given steroids 2 g of magnesium DuoNebs and sent for admission.  No association of chest pain abdominal pain or dysuria.  Patient does report subjective fevers\"    Preadmission Environment:   Pt. Lives                                              Spouse  Home environment:                            apartment second floor  Steps to enter first floor:                     20 steps to enter  Steps to second floor/basement:        N/A  Laundry:                                              Family completes  Bathroom:                                           tub/shower unit and standard height toilet  Pt sleeps in a:                                     Flat  Inpatient Occupational Therapy Treatment    Unit: ICU  Date:  2/1/2025  Patient Name:    Sam Terrazas  Admitting diagnosis:  Influenza A [J10.1]  Acute exacerbation of chronic obstructive pulmonary disease (COPD) (HCC) [J44.1]  COPD exacerbation (HCC) [J44.1]  Acute on chronic respiratory failure with hypoxia and hypercapnia [J96.21, J96.22]  Admit Date:  1/28/2025  Precautions/Restrictions/WB Status/ Lines/ Wounds/ Oxygen: Fall risk, Bed/chair alarm, Lines (IV and Supplemental O2 (6L)), Telemetry, Continuous pulse oximetry, and Isolation Precautions: Droplet    Treatment Time:  11:36-12:06  Treatment Number:  2  Timed Code Treatment Minutes: 30 minutes  Total Treatment Minutes:  30  minutes    Patient Goals for Therapy: \"to get better\"          Discharge Recommendations: SNF  DME needs for discharge: Defer to facility       Therapy recommendations for staff:   Assist of 1 for transfers with use of rolling walker (RW) and gait belt to/from BSC  to/from chair    History of Present Illness: Per admission H&P by Dr. Rivera on1/28/25:  \"61 y.o. male who presented to [facility] with past ministry of COPD, presented ED with chief complaint of shortness of breath     Patient reports shortness of breath started 4 days ago progressively worsening came here when severe respiratory distress was placed on BiPAP with improvement of the blood gas.  Patient was given steroids 2 g of magnesium DuoNebs and sent for admission.  No association of chest pain abdominal pain or dysuria.  Patient does report subjective fevers\"    Preadmission Environment:   Pt. Lives                                              Spouse  Home environment:                            apartment second floor  Steps to enter first floor:                     20 steps to enter  Steps to second floor/basement:        N/A  Laundry:                                              Family completes  Bathroom:                                           tub/shower  Inpatient Occupational Therapy Treatment    Unit: PCU  Date:  2/3/2025  Patient Name:    Sam Terrazas  Admitting diagnosis:  Influenza A [J10.1]  Acute exacerbation of chronic obstructive pulmonary disease (COPD) (HCC) [J44.1]  COPD exacerbation (HCC) [J44.1]  Acute on chronic respiratory failure with hypoxia and hypercapnia [J96.21, J96.22]  Admit Date:  1/28/2025  Precautions/Restrictions/WB Status/ Lines/ Wounds/ Oxygen: Fall risk, Bed/chair alarm, Lines (IV and Supplemental O2 (5L)), Telemetry, Continuous pulse oximetry, and Isolation Precautions: Droplet    Treatment Time:  15:40-15:58  Treatment Number:  3  Timed Code Treatment Minutes: 28 minutes  Total Treatment Minutes:  28 minutes    Patient Goals for Therapy: \"to get better\"          Discharge Recommendations: Home with PRN assist and OP pulmonary rehab  DME needs for discharge: Needs Met       Therapy recommendations for staff:   Independent for ambulation with use of No AD within room    History of Present Illness: Per admission H&P by Dr. Rivera on1/28/25:  \"61 y.o. male who presented to [facility] with past ministry of COPD, presented ED with chief complaint of shortness of breath     Patient reports shortness of breath started 4 days ago progressively worsening came here when severe respiratory distress was placed on BiPAP with improvement of the blood gas.  Patient was given steroids 2 g of magnesium DuoNebs and sent for admission.  No association of chest pain abdominal pain or dysuria.  Patient does report subjective fevers\"    Preadmission Environment:   Pt. Lives                                              Spouse  Home environment:                            apartment second floor  Steps to enter first floor:                     20 steps to enter  Steps to second floor/basement:        N/A  Laundry:                                              Family completes  Bathroom:                                           tub/shower unit and  Inpatient Physical Therapy Evaluation & Treatment    Unit: ICU  Date:  1/29/2025  Patient Name:    Sam Terrazas  Admitting diagnosis:  Influenza A [J10.1]  Acute exacerbation of chronic obstructive pulmonary disease (COPD) (HCC) [J44.1]  COPD exacerbation (HCC) [J44.1]  Acute on chronic respiratory failure with hypoxia and hypercapnia [J96.21, J96.22]  Admit Date:  1/28/2025  Precautions/Restrictions/WB Status/ Lines/ Wounds/ Oxygen: Fall risk, Bed/chair alarm, Lines (IV and Supplemental O2 (5L)), Telemetry, Continuous pulse oximetry, and Isolation Precautions: Droplet      Treatment Time:  1330 - 1418  Treatment Number:  1   Timed Code Treatment Minutes: 38 minutes  Total Treatment Minutes:  48  minutes    Patient Stated Goals for Therapy: \" to get better \"          Discharge Recommendations: SNF  DME needs for discharge: Defer to facility       Therapy recommendation for EMS Transport: can transport by wheelchair    Therapy recommendations for staff:   Assist of 1 for transfers with use of gait belt to/from BSC  to/from chair    History of Present Illness:   Per admission H&P by Dr. Rivera on1/28/25:  \"61 y.o. male who presented to [facility] with past ministry of COPD, presented ED with chief complaint of shortness of breath     Patient reports shortness of breath started 4 days ago progressively worsening came here when severe respiratory distress was placed on BiPAP with improvement of the blood gas.  Patient was given steroids 2 g of magnesium DuoNebs and sent for admission.  No association of chest pain abdominal pain or dysuria.  Patient does report subjective fevers\"    Preadmission Environment:   Pt. Lives                                              Spouse  Home environment:                            apartment second floor  Steps to enter first floor:                     20 steps to enter  Steps to second floor/basement:        N/A  Laundry:                                              Family  Inpatient Physical Therapy Treatment    Unit: ICU  Date:  1/31/2025  Patient Name:    Sam Terrazas  Admitting diagnosis:  Influenza A [J10.1]  Acute exacerbation of chronic obstructive pulmonary disease (COPD) (HCC) [J44.1]  COPD exacerbation (HCC) [J44.1]  Acute on chronic respiratory failure with hypoxia and hypercapnia [J96.21, J96.22]  Admit Date:  1/28/2025  Precautions/Restrictions/WB Status/ Lines/ Wounds/ Oxygen: Fall risk, Bed/chair alarm, Lines (IV and Supplemental O2 (6L)), and Telemetry        Treatment Time:  13:00-13:38  Treatment Number:  2   Timed Code Treatment Minutes: 38 minutes  Total Treatment Minutes:  38  minutes    Patient Stated Goals for Therapy: None stated          Discharge Recommendations: SNF  DME needs for discharge: Defer to facility       Therapy recommendation for EMS Transport: can transport by wheelchair    Therapy recommendations for staff:   Assist of 1 for transfers with use of rolling walker (RW) to/from BSC  to/from chair    History of Present Illness:   Per admission H&P by Dr. Rivera on1/28/25:  \"61 y.o. male who presented to [facility] with past ministry of COPD, presented ED with chief complaint of shortness of breath     Patient reports shortness of breath started 4 days ago progressively worsening came here when severe respiratory distress was placed on BiPAP with improvement of the blood gas.  Patient was given steroids 2 g of magnesium DuoNebs and sent for admission.  No association of chest pain abdominal pain or dysuria.  Patient does report subjective fevers\"    AM-PAC Mobility Score    AM-PAC Inpatient Mobility Raw Score : 16       Subjective  Patient lying reclined in bed with spouse present.  Pt agreeable to this PT session.     Cognition    A&O x4   Able to follow 2 step commands    Pain   No      Activity Tolerance   During therapy session noted pt with SOB/FRAUSTO    Pt Position BP (mmHg) HR (bpm) SpO2 (%) on 6L  Comments   Supine at rest 112/79 80 90   Inpatient Physical Therapy Treatment    Unit: ICU  Date:  2/3/2025  Patient Name:    Sam Terrazas  Admitting diagnosis:  Influenza A [J10.1]  Acute exacerbation of chronic obstructive pulmonary disease (COPD) (HCC) [J44.1]  COPD exacerbation (HCC) [J44.1]  Acute on chronic respiratory failure with hypoxia and hypercapnia [J96.21, J96.22]  Admit Date:  1/28/2025  Precautions/Restrictions/WB Status/ Lines/ Wounds/ Oxygen: Fall risk, Bed/chair alarm, Lines (IV and Supplemental O2 (5L)), and Telemetry    Treatment Time:  10:53-11:03  Treatment Number:  3   Timed Code Treatment Minutes: 10 minutes  Total Treatment Minutes:  10 minutes    Patient Stated Goals for Therapy: None stated          Discharge Recommendations: Home with PRN assistance  DME needs for discharge: Needs Met       Therapy recommendation for EMS Transport: can transport by wheelchair    Therapy recommendations for staff:   Independent for ambulation with use of No AD within room    History of Present Illness:   Per admission H&P by Dr. Rivera on1/28/25:  \"61 y.o. male who presented to [facility] with past ministry of COPD, presented ED with chief complaint of shortness of breath\"  +PNA    AM-PAC Mobility Score    AM-PAC Inpatient Mobility Raw Score : 24       Subjective  Patient lying reclined in bed with no family present.  Pt agreeable to this PT session. No complaints. Denies sitting in chair to end session.     Cognition    A&O x4   Able to follow 2 step commands    Pain   No    Activity Tolerance   During therapy session noted pt with no adverse symptoms    Pt Position BP (mmHg) HR (bpm) SpO2 (%) on 5L  Comments   Supine at rest       Seated at EOB  90's 94-95%    After ambulating x 100 ft  100 91-92% No complaints, no overt signs of SOB   End of session         Objective  Balance  Static Sitting:  Independent  Dynamic Sitting:  Independent   Comments:     Static Standing: Supervision progressing to Indep   Dynamic Standing:  Interdisciplinary rounds completed at this time. Discussed POC   Occupational Therapy  Orders received, chart reviewed. Patient working with another provider and unavailable at this time. Will reattempt as patient status and therapy schedules allow.    Bethany Alba, OTR/L [id number]       Patient care assumed, assessment completed as charted. Patient resting in bed, in no apparent distress. States no needs at this time. Will monitor.    Patient educated on discharge instructions as well as new medications use, dosage, administration and possible side effects.  Patient verified knowledge. IV removed without difficulty and dry dressing in place. Telemetry monitor removed and returned to CMU. Pt left facility in stable condition to Home with all of their personal belongings.     Patient placed on bipap for the hs on settings of 10/5 30%   Patient placed on bipap for the hs on settings of 10/5 50%   Patient resting in bed, patient is pink, warm, and dry. Respirations E/E on 6/l high flow. Iv sites are unremarkable. No S/S of acute distress noted. Head to toe completed at this time. Patient is A/O x4. Medication given patient tolerated well. Call light in easy reach, patient reminded to use call light for needs and assistance. Bed locked and in lowest position side rails up x2.    Patient updates given to DIAMOND Shin. Patient has rested comfortably overnight without acute changes in assessment noted. Care transferred.    Prevention  dressing applied to bridge of nose and other facial bony prominences upon BiPAP/CPAP initiation.  Consulted RN regarding the assessment of skin integrity.      Pt A&O x4, in bed eating breakfast at this time. O2 sats 94% on 5L NC. Accessory muscles used, pt states he feels close to normal. IV lines and monitors checked and tightened. No other needs noted. Awaiting MD rounds.    Pt admitted to Room 3006.  Pt A/O. On 5 LT oxygen. BiPAP at bed side. Chlorhexidine bath given. Pt using accessory muscles to breath. 2 PIV's in place. Vs S.  [place] to room.  Denies c/o pain.  Assessment complete as charted. Call light in reach. Denies other needs. Will continue to monitor.   Pt ambulated to and from BSC, showing excessive use of respiratory muscles. Difficulty catching his breath. O2 sats dropped to 86% on 6 L. Pt sats recovered quickly once settled back in bed     Pt asked to come off BiPap. Drinking a cup of coffee.   Pt awake, sitting up in bed, on bipap.  Dyspneic at rest. Spo2 91%.  Denies pain.   Call light in reach.    Pt back to bed from chair with min assistance    Pt in bed eating breakfast. A&O x4. VSS. Pt continues to have accessory muscle use with breathing, but patient states this is pretty normal. Lungs wheezy. IV lines and monitors checked and tightened. Denies any other needs. Awaiting MD rounds.    Pt in bed, awake, A/O X4. Shift assessment complete, night meds given. No C/o pain at this time. Night time snack given.  . No other needs expressed. Call light in reach. Will monitor.   Radha Veras RN       Pt in bed, bipap removed a few mins and then placed back on. Denies needs at this time. Call light in reach. Will monitor Radha Veras RN     Pt in bed, eyes closed. No distress noted. Remains on bipap. Call light in reach. Will continue to monitor.  Radha Veras RN     Pt refusing BIPAP at this time.   Pt refusing to wear BIPAP   Pt remains on Bipap, denies pain.  Vitals stable.    Pt requesting to take off Bipap.  Placed on 5 L/NC. Water given to pt.  Denies needs at this time. Call light in reach.    Pt requesting to wear Bipap, states his breathing is worse. Placed on bipap, previous settings.      Pt resting with eyes closed.  Remains on Bipap.  Spo2 86%.  Repositioned pt to R side.  Pt denies pain or shortness of breath.   Spo2 up to 91%.   Pt tolerated bath fair. Becomes very short of breath with minimal exertion but does not desaturate. Remains on 6 liters   Pulmonary Progress Note  CC: SOB    Subjective:  slowly improving      EXAM: /77   Pulse 74   Temp 97.9 °F (36.6 °C) (Oral)   Resp 20   Ht 1.727 m (5' 8\")   Wt 64 kg (141 lb 1.6 oz)   SpO2 95%   BMI 21.45 kg/m²  on 5L  Constitutional:  No acute distress.   Eyes: PERRL. Conjunctivae anicteric.   ENT: Normal nose. Normal tongue.    Neck:  Trachea is midline.   Respiratory: No accessory muscle usage.   decreased breath sounds. + wheezes. No rales. No Rhonchi.  Cardiovascular: Normal S1S2. No digit clubbing. No digit cyanosis. No LE edema.   Psychiatric: No anxiety or Agitation. Alert and Oriented to person, place and time.    Scheduled Meds:   predniSONE  40 mg Oral Daily    ipratropium 0.5 mg-albuterol 2.5 mg  1 Dose Inhalation 4x Daily RT    insulin lispro  0-4 Units SubCUTAneous 4x Daily AC & HS    aspirin  81 mg Oral Daily    buPROPion  150 mg Oral BID    budesonide-formoterol  2 puff Inhalation BID RT    And    tiotropium  2 puff Inhalation Daily RT    sodium chloride flush  10 mL IntraVENous 2 times per day    enoxaparin  40 mg SubCUTAneous Daily     Continuous Infusions:   dextrose      sodium chloride       PRN Meds:  glucose, dextrose bolus **OR** dextrose bolus, glucagon (rDNA), dextrose, sodium chloride flush, sodium chloride, promethazine **OR** ondansetron, melatonin, nicotine, acetaminophen **OR** acetaminophen, polyethylene glycol, albuterol    Labs:  CBC: No results for input(s): \"WBC\", \"HGB\", \"HCT\", \"MCV\", \"PLT\" in the last 72 hours.  BMP: No results for input(s): \"NA\", \"K\", \"CL\", \"CO2\", \"PHOS\", \"BUN\", \"CREATININE\" in the last 72 hours.    Invalid input(s): \"CA\"    Cultures:    Films:      ASSESSMENT:  Acute hypoxic respiratory failure   Influenza A  COPD exacerbation  Tobacco abuse      PLAN:   Supplemental oxygen to maintain SaO2 >92%; wean as tolerated  Intensive inhaled bronchodilator therapy.  Prednisone taper  Tamiflu completed  Sputum GS&C.  Acapella QID to mobilize respiratory  Pulmonary Progress Note  CC: SOB    Subjective:  slowly improving      EXAM: BP 93/71   Pulse 73   Temp 97.5 °F (36.4 °C) (Oral)   Resp 16   Ht 1.727 m (5' 8\")   Wt 65.2 kg (143 lb 11.2 oz)   SpO2 91%   BMI 21.85 kg/m²  on 5L  Constitutional:  No acute distress.   Eyes: PERRL. Conjunctivae anicteric.   ENT: Normal nose. Normal tongue.    Neck:  Trachea is midline.   Respiratory: No accessory muscle usage.   decreased breath sounds. + wheezes. No rales. No Rhonchi.  Cardiovascular: Normal S1S2. No digit clubbing. No digit cyanosis. No LE edema.   Psychiatric: No anxiety or Agitation. Alert and Oriented to person, place and time.    Scheduled Meds:   budesonide  0.5 mg Nebulization BID RT    ipratropium 0.5 mg-albuterol 2.5 mg  1 Dose Inhalation 4x Daily RT    methylPREDNISolone  40 mg IntraVENous Q12H    insulin lispro  0-4 Units SubCUTAneous 4x Daily AC & HS    aspirin  81 mg Oral Daily    buPROPion  150 mg Oral BID    budesonide-formoterol  2 puff Inhalation BID RT    And    tiotropium  2 puff Inhalation Daily RT    sodium chloride flush  10 mL IntraVENous 2 times per day    enoxaparin  40 mg SubCUTAneous Daily     Continuous Infusions:   dextrose      sodium chloride       PRN Meds:  glucose, dextrose bolus **OR** dextrose bolus, glucagon (rDNA), dextrose, sodium chloride flush, sodium chloride, promethazine **OR** ondansetron, melatonin, nicotine, acetaminophen **OR** acetaminophen, polyethylene glycol, albuterol    Labs:  CBC: No results for input(s): \"WBC\", \"HGB\", \"HCT\", \"MCV\", \"PLT\" in the last 72 hours.  BMP: No results for input(s): \"NA\", \"K\", \"CL\", \"CO2\", \"PHOS\", \"BUN\", \"CREATININE\" in the last 72 hours.    Invalid input(s): \"CA\"    Cultures:    Films:      ASSESSMENT:  Acute hypoxic respiratory failure   Influenza A  COPD exacerbation  Tobacco abuse      PLAN:   Using Bipap qhs  Supplemental oxygen to maintain SaO2 >92%; wean as tolerated  Intensive inhaled bronchodilator therapy.  Prednisone  RT Inhaler-Nebulizer Bronchodilator Protocol Note    There is a bronchodilator order in the chart from a provider indicating to follow the RT Bronchodilator Protocol and there is an “Initiate RT Inhaler-Nebulizer Bronchodilator Protocol” order as well (see protocol at bottom of note).    CXR Findings:  No results found.    The findings from the last RT Protocol Assessment were as follows:   History Pulmonary Disease: (P) Chronic pulmonary disease  Respiratory Pattern: (P) Mild dyspnea at rest, irregular pattern, or RR 21-25 bpm  Breath Sounds: (P) Inspiratory and expiratory or bilateral wheezing and/or rhonchi  Cough: (P) Strong, productive  Indication for Bronchodilator Therapy: (P) Wheezing associated with pulm disorder  Bronchodilator Assessment Score: (P) 13    Aerosolized bronchodilator medication orders have been revised according to the RT Inhaler-Nebulizer Bronchodilator Protocol below.    Respiratory Therapist to perform RT Therapy Protocol Assessment initially then follow the protocol.  Repeat RT Therapy Protocol Assessment PRN for score 0-3 or on second treatment, BID, and PRN for scores above 3.    No Indications - adjust the frequency to every 6 hours PRN wheezing or bronchospasm, if no treatments needed after 48 hours then discontinue using Per Protocol order mode.     If indication present, adjust the RT bronchodilator orders based on the Bronchodilator Assessment Score as indicated below.  Use Inhaler orders unless patient has one or more of the following: on home nebulizer, not able to hold breath for 10 seconds, is not alert and oriented, cannot activate and use MDI correctly, or respiratory rate 25 breaths per minute or more, then use the equivalent nebulizer order(s) with same Frequency and PRN reasons based on the score.  If a patient is on this medication at home then do not decrease Frequency below that used at home.    0-3 - enter or revise RT bronchodilator order(s) to equivalent RT  RT Inhaler-Nebulizer Bronchodilator Protocol Note    There is a bronchodilator order in the chart from a provider indicating to follow the RT Bronchodilator Protocol and there is an “Initiate RT Inhaler-Nebulizer Bronchodilator Protocol” order as well (see protocol at bottom of note).    CXR Findings:  No results found.    The findings from the last RT Protocol Assessment were as follows:   History Pulmonary Disease: (P) Chronic pulmonary disease  Respiratory Pattern: (P) Mild dyspnea at rest, irregular pattern, or RR 21-25 bpm  Breath Sounds: (P) Inspiratory and expiratory or bilateral wheezing and/or rhonchi  Cough: (P) Strong, spontaneous, non-productive  Indication for Bronchodilator Therapy: (P) Wheezing associated with pulm disorder  Bronchodilator Assessment Score: (P) 12    Aerosolized bronchodilator medication orders have been revised according to the RT Inhaler-Nebulizer Bronchodilator Protocol below.    Respiratory Therapist to perform RT Therapy Protocol Assessment initially then follow the protocol.  Repeat RT Therapy Protocol Assessment PRN for score 0-3 or on second treatment, BID, and PRN for scores above 3.    No Indications - adjust the frequency to every 6 hours PRN wheezing or bronchospasm, if no treatments needed after 48 hours then discontinue using Per Protocol order mode.     If indication present, adjust the RT bronchodilator orders based on the Bronchodilator Assessment Score as indicated below.  Use Inhaler orders unless patient has one or more of the following: on home nebulizer, not able to hold breath for 10 seconds, is not alert and oriented, cannot activate and use MDI correctly, or respiratory rate 25 breaths per minute or more, then use the equivalent nebulizer order(s) with same Frequency and PRN reasons based on the score.  If a patient is on this medication at home then do not decrease Frequency below that used at home.    0-3 - enter or revise RT bronchodilator order(s) to  RT Inhaler-Nebulizer Bronchodilator Protocol Note    There is a bronchodilator order in the chart from a provider indicating to follow the RT Bronchodilator Protocol and there is an “Initiate RT Inhaler-Nebulizer Bronchodilator Protocol” order as well (see protocol at bottom of note).    CXR Findings:  No results found.    The findings from the last RT Protocol Assessment were as follows:   History Pulmonary Disease: (P) Chronic pulmonary disease  Respiratory Pattern: (P) Mild dyspnea at rest, irregular pattern, or RR 21-25 bpm  Breath Sounds: (P) Intermittent or unilateral wheezes  Cough: (P) Strong, productive  Indication for Bronchodilator Therapy: (P) Wheezing associated with pulm disorder  Bronchodilator Assessment Score: (P) 11    Aerosolized bronchodilator medication orders have been revised according to the RT Inhaler-Nebulizer Bronchodilator Protocol below.    Respiratory Therapist to perform RT Therapy Protocol Assessment initially then follow the protocol.  Repeat RT Therapy Protocol Assessment PRN for score 0-3 or on second treatment, BID, and PRN for scores above 3.    No Indications - adjust the frequency to every 6 hours PRN wheezing or bronchospasm, if no treatments needed after 48 hours then discontinue using Per Protocol order mode.     If indication present, adjust the RT bronchodilator orders based on the Bronchodilator Assessment Score as indicated below.  Use Inhaler orders unless patient has one or more of the following: on home nebulizer, not able to hold breath for 10 seconds, is not alert and oriented, cannot activate and use MDI correctly, or respiratory rate 25 breaths per minute or more, then use the equivalent nebulizer order(s) with same Frequency and PRN reasons based on the score.  If a patient is on this medication at home then do not decrease Frequency below that used at home.    0-3 - enter or revise RT bronchodilator order(s) to equivalent RT Bronchodilator order with Frequency  RT Inhaler-Nebulizer Bronchodilator Protocol Note    There is a bronchodilator order in the chart from a provider indicating to follow the RT Bronchodilator Protocol and there is an “Initiate RT Inhaler-Nebulizer Bronchodilator Protocol” order as well (see protocol at bottom of note).    CXR Findings:  No results found.    The findings from the last RT Protocol Assessment were as follows:   History Pulmonary Disease: Chronic pulmonary disease  Respiratory Pattern: Dyspnea on exertion or RR 21-25 bpm  Breath Sounds: Intermittent or unilateral wheezes  Cough: Strong, spontaneous, non-productive  Indication for Bronchodilator Therapy: Decreased or absent breath sounds  Bronchodilator Assessment Score: 8    Aerosolized bronchodilator medication orders have been revised according to the RT Inhaler-Nebulizer Bronchodilator Protocol below.    Respiratory Therapist to perform RT Therapy Protocol Assessment initially then follow the protocol.  Repeat RT Therapy Protocol Assessment PRN for score 0-3 or on second treatment, BID, and PRN for scores above 3.    No Indications - adjust the frequency to every 6 hours PRN wheezing or bronchospasm, if no treatments needed after 48 hours then discontinue using Per Protocol order mode.     If indication present, adjust the RT bronchodilator orders based on the Bronchodilator Assessment Score as indicated below.  Use Inhaler orders unless patient has one or more of the following: on home nebulizer, not able to hold breath for 10 seconds, is not alert and oriented, cannot activate and use MDI correctly, or respiratory rate 25 breaths per minute or more, then use the equivalent nebulizer order(s) with same Frequency and PRN reasons based on the score.  If a patient is on this medication at home then do not decrease Frequency below that used at home.    0-3 - enter or revise RT bronchodilator order(s) to equivalent RT Bronchodilator order with Frequency of every 4 hours PRN for wheezing  RT Inhaler-Nebulizer Bronchodilator Protocol Note    There is a bronchodilator order in the chart from a provider indicating to follow the RT Bronchodilator Protocol and there is an “Initiate RT Inhaler-Nebulizer Bronchodilator Protocol” order as well (see protocol at bottom of note).    CXR Findings:  No results found.    The findings from the last RT Protocol Assessment were as follows:   History Pulmonary Disease: Chronic pulmonary disease  Respiratory Pattern: Dyspnea on exertion or RR 21-25 bpm  Breath Sounds: Slightly diminished and/or crackles  Cough: Strong, spontaneous, non-productive  Indication for Bronchodilator Therapy: Decreased or absent breath sounds  Bronchodilator Assessment Score: 6    Aerosolized bronchodilator medication orders have been revised according to the RT Inhaler-Nebulizer Bronchodilator Protocol below.    Respiratory Therapist to perform RT Therapy Protocol Assessment initially then follow the protocol.  Repeat RT Therapy Protocol Assessment PRN for score 0-3 or on second treatment, BID, and PRN for scores above 3.    No Indications - adjust the frequency to every 6 hours PRN wheezing or bronchospasm, if no treatments needed after 48 hours then discontinue using Per Protocol order mode.     If indication present, adjust the RT bronchodilator orders based on the Bronchodilator Assessment Score as indicated below.  Use Inhaler orders unless patient has one or more of the following: on home nebulizer, not able to hold breath for 10 seconds, is not alert and oriented, cannot activate and use MDI correctly, or respiratory rate 25 breaths per minute or more, then use the equivalent nebulizer order(s) with same Frequency and PRN reasons based on the score.  If a patient is on this medication at home then do not decrease Frequency below that used at home.    0-3 - enter or revise RT bronchodilator order(s) to equivalent RT Bronchodilator order with Frequency of every 4 hours PRN for wheezing  RT Inhaler-Nebulizer Bronchodilator Protocol Note    There is a bronchodilator order in the chart from a provider indicating to follow the RT Bronchodilator Protocol and there is an “Initiate RT Inhaler-Nebulizer Bronchodilator Protocol” order as well (see protocol at bottom of note).    CXR Findings:  No results found.    The findings from the last RT Protocol Assessment were as follows:   History Pulmonary Disease: Chronic pulmonary disease  Respiratory Pattern: Mild dyspnea at rest, irregular pattern, or RR 21-25 bpm  Breath Sounds: Intermittent or unilateral wheezes  Cough: Strong, productive  Indication for Bronchodilator Therapy: Wheezing associated with pulm disorder  Bronchodilator Assessment Score: 11    Aerosolized bronchodilator medication orders have been revised according to the RT Inhaler-Nebulizer Bronchodilator Protocol below.    Respiratory Therapist to perform RT Therapy Protocol Assessment initially then follow the protocol.  Repeat RT Therapy Protocol Assessment PRN for score 0-3 or on second treatment, BID, and PRN for scores above 3.    No Indications - adjust the frequency to every 6 hours PRN wheezing or bronchospasm, if no treatments needed after 48 hours then discontinue using Per Protocol order mode.     If indication present, adjust the RT bronchodilator orders based on the Bronchodilator Assessment Score as indicated below.  Use Inhaler orders unless patient has one or more of the following: on home nebulizer, not able to hold breath for 10 seconds, is not alert and oriented, cannot activate and use MDI correctly, or respiratory rate 25 breaths per minute or more, then use the equivalent nebulizer order(s) with same Frequency and PRN reasons based on the score.  If a patient is on this medication at home then do not decrease Frequency below that used at home.    0-3 - enter or revise RT bronchodilator order(s) to equivalent RT Bronchodilator order with Frequency of every 4 hours PRN for  RT Inhaler-Nebulizer Bronchodilator Protocol Note    There is a bronchodilator order in the chart from a provider indicating to follow the RT Bronchodilator Protocol and there is an “Initiate RT Inhaler-Nebulizer Bronchodilator Protocol” order as well (see protocol at bottom of note).    CXR Findings:  No results found.    The findings from the last RT Protocol Assessment were as follows:   History Pulmonary Disease: Chronic pulmonary disease  Respiratory Pattern: Mild dyspnea at rest, irregular pattern, or RR 21-25 bpm  Breath Sounds: Intermittent or unilateral wheezes  Cough: Strong, spontaneous, non-productive  Indication for Bronchodilator Therapy: Wheezing associated with pulm disorder  Bronchodilator Assessment Score: 10    Aerosolized bronchodilator medication orders have been revised according to the RT Inhaler-Nebulizer Bronchodilator Protocol below.    Respiratory Therapist to perform RT Therapy Protocol Assessment initially then follow the protocol.  Repeat RT Therapy Protocol Assessment PRN for score 0-3 or on second treatment, BID, and PRN for scores above 3.    No Indications - adjust the frequency to every 6 hours PRN wheezing or bronchospasm, if no treatments needed after 48 hours then discontinue using Per Protocol order mode.     If indication present, adjust the RT bronchodilator orders based on the Bronchodilator Assessment Score as indicated below.  Use Inhaler orders unless patient has one or more of the following: on home nebulizer, not able to hold breath for 10 seconds, is not alert and oriented, cannot activate and use MDI correctly, or respiratory rate 25 breaths per minute or more, then use the equivalent nebulizer order(s) with same Frequency and PRN reasons based on the score.  If a patient is on this medication at home then do not decrease Frequency below that used at home.    0-3 - enter or revise RT bronchodilator order(s) to equivalent RT Bronchodilator order with Frequency of every  RT Inhaler-Nebulizer Bronchodilator Protocol Note    There is a bronchodilator order in the chart from a provider indicating to follow the RT Bronchodilator Protocol and there is an “Initiate RT Inhaler-Nebulizer Bronchodilator Protocol” order as well (see protocol at bottom of note).    CXR Findings:  No results found.    The findings from the last RT Protocol Assessment were as follows:   History Pulmonary Disease: Chronic pulmonary disease  Respiratory Pattern: Use of accessory muscles, prolonged exhalation, or RR 26-30 bpm  Breath Sounds: Inspiratory and expiratory or bilateral wheezing and/or rhonchi  Cough: Strong, productive  Indication for Bronchodilator Therapy: Wheezing associated with pulm disorder  Bronchodilator Assessment Score: 15    Aerosolized bronchodilator medication orders have been revised according to the RT Inhaler-Nebulizer Bronchodilator Protocol below.    Respiratory Therapist to perform RT Therapy Protocol Assessment initially then follow the protocol.  Repeat RT Therapy Protocol Assessment PRN for score 0-3 or on second treatment, BID, and PRN for scores above 3.    No Indications - adjust the frequency to every 6 hours PRN wheezing or bronchospasm, if no treatments needed after 48 hours then discontinue using Per Protocol order mode.     If indication present, adjust the RT bronchodilator orders based on the Bronchodilator Assessment Score as indicated below.  Use Inhaler orders unless patient has one or more of the following: on home nebulizer, not able to hold breath for 10 seconds, is not alert and oriented, cannot activate and use MDI correctly, or respiratory rate 25 breaths per minute or more, then use the equivalent nebulizer order(s) with same Frequency and PRN reasons based on the score.  If a patient is on this medication at home then do not decrease Frequency below that used at home.    0-3 - enter or revise RT bronchodilator order(s) to equivalent RT Bronchodilator order with  RT Inhaler-Nebulizer Bronchodilator Protocol Note    There is a bronchodilator order in the chart from a provider indicating to follow the RT Bronchodilator Protocol and there is an “Initiate RT Inhaler-Nebulizer Bronchodilator Protocol” order as well (see protocol at bottom of note).    CXR Findings:  XR CHEST PORTABLE    Result Date: 1/28/2025  Mild central pulmonary congestion and hazy perihilar and bibasilar interstitial and ground-glass opacities which is probably due to pulmonary edema and/or associated multisegmental bronchopneumonia. Tiny bibasilar pleural effusions which is more apparent. 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       The findings from the last RT Protocol Assessment were as follows:   History Pulmonary Disease: (P) Chronic pulmonary disease  Respiratory Pattern: (P) Mild dyspnea at rest, irregular pattern, or RR 21-25 bpm  Breath Sounds: (P) Inspiratory and expiratory or bilateral wheezing and/or rhonchi  Cough: (P) Strong, productive  Indication for Bronchodilator Therapy: (P) Wheezing associated with pulm disorder  Bronchodilator Assessment Score: (P) 13    Aerosolized bronchodilator medication orders have been revised according to the RT Inhaler-Nebulizer Bronchodilator Protocol below.    Respiratory Therapist to perform RT Therapy Protocol Assessment initially then follow the protocol.  Repeat RT Therapy Protocol Assessment PRN for score 0-3 or on second treatment, BID, and PRN for scores above 3.    No Indications - adjust the frequency to every 6 hours PRN wheezing or bronchospasm, if no treatments needed after 48 hours then discontinue using Per Protocol order mode.     If indication present, adjust the RT bronchodilator orders based on the Bronchodilator Assessment Score as indicated below.  Use Inhaler orders unless patient has one or more of the following: on home nebulizer, not able to hold breath for 10 seconds, is not alert and oriented,  Reassessment completed, no changes noted at this time. VSS call light within reach    Shift assessment complete - see flow sheet, pt denies needs, call light within reach.   Shift assessment completed as documented on flowsheeet. Pt sitting up in bed, wearing oxygen, spo2 88%. Pt very sob at rest. Pt will answer simple yes or no questions. Lungs diminished throughout. Productive cough small amount of thick secretion. Voids per urinal. Call light in reach. Monitoring closely    Shift assessment completed as documented on flowsheet. Pt awake and alert, sitting  up in bed. Stated he is breathing much better this time. VSS. NSR without ectopy. VSS voids per urinal. Call light within reach    Shift assessment completed as documented on flowsheet. Pt sitting up in bed, spo2 90% on 6L. Stated he is feeling better. Lungs diminished with exp wheezes. NSR without ectopy. Call light within reach. Monitoring closely     Shift assessment completed.  Pt on Bipap.  Denies pain.  Pt resp labored at rest in bed.    Sinus rhythm on monitor. Voiding per urinal.  Call light in reach.      Tolerating Bipap well. No C/O.    Writer returned patient spouse Ruthann phone call. All questions answered at this time.    right4 Eyes Skin Assessment     NAME:  Sam Terrazas  YOB: 1963  MEDICAL RECORD NUMBER:  [id number]    The patient is being assessed for  Transfer to New Unit    I agree that at least one RN has performed a thorough Head to Toe Skin Assessment on the patient. ALL assessment sites listed below have been assessed.      Areas assessed by both nurses:    Head, Face, Ears, Shoulders, Back, Chest, Arms, Elbows, Hands, Sacrum. Buttock, Coccyx, Ischium, Legs. Feet and Heels, and Under Medical Devices         Does the Patient have a Wound? No noted wound(s)       Jose Prevention initiated by RN: No  Wound Care Orders initiated by RN: No    Pressure Injury (Stage 3,4, Unstageable, DTI, NWPT, and Complex wounds) if present, place Wound referral order by RN under : No    New Ostomies, if present place, Ostomy referral order under : No     Nurse 1 eSignature: Electronically signed by Rosa Maria Diaz RN on 2/2/25 at 6:52 PM EST    **SHARE this note so that the co-signing nurse can place an eSignature**    Nurse 2 eSignature: Electronically signed by Sanjeev Gee RN on 2/2/25 at 6:58 PM EST    anicteric, Conjunctivae clear.  Neck: Supple,Trachea midline, no goiter  Respiratory:+accessory muscle usage, Normal respiratory effort.  Wheezing +  Cardiovascular:  Regular rate and rhythm, capillary refill 2 seconds  Abdomen: Soft, non-tender, non-distended with normal bowel sounds.  Musculoskeletal:  No clubbing, cyanosis. trace edema LE bilaterally.   Skin: turgor normal.  No new rashes or lesions.  Neurologic: Alert and oriented x4, no new focal sensory/motor deficits.     Lab Data:  CBC:   Recent Labs     01/28/25 1851 01/29/25 0449 01/30/25 0428   WBC 6.5 3.4* 9.2   RBC 4.73 4.77 4.47   HGB 14.7 14.5 13.8   HCT 44.1 43.9 41.1   MCV 93.2 92.0 92.0   RDW 14.6 14.6 14.5    186 269     BMP:   Recent Labs     01/28/25 1851 01/29/25 0449 01/30/25 0428    139 136   K 4.4 4.3 4.5   CL 97* 99 100   CO2 29 27 24   BUN 13 15 23*   CREATININE 0.9 1.0 0.7*     BNP: No results for input(s): \"BNP\" in the last 72 hours.  PT/INR: No results for input(s): \"PROTIME\", \"INR\" in the last 72 hours.  APTT:No results for input(s): \"APTT\" in the last 72 hours.  CARDIAC ENZYMES: No results for input(s): \"CKMB\", \"CKMBINDEX\", \"TROPONINI\" in the last 72 hours.    Invalid input(s): \"CKTOTAL;3\"  FASTING LIPID PANEL:No results found for: \"CHOL\", \"HDL\", \"TRIG\"  LIVER PROFILE:   Recent Labs     01/28/25 1851 01/29/25 0449 01/30/25 0428   AST 31 26 24   ALT 18 16 15   BILITOT 0.4 0.3 <0.2   ALKPHOS 55 51 45         XR CHEST PORTABLE   Final Result   Mild central pulmonary congestion and hazy perihilar and bibasilar   interstitial and ground-glass opacities which is probably due to pulmonary   edema and/or associated multisegmental bronchopneumonia.      Tiny bibasilar pleural effusions which is more apparent.      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           Labs and imaging reviewed     Assessment & Plan:     Patient Active Problem List    Diagnosis Date Noted    Influenza A  breaths per minute or more, then use the equivalent nebulizer order(s) with same Frequency and PRN reasons based on the score.  If a patient is on this medication at home then do not decrease Frequency below that used at home.    0-3 - enter or revise RT bronchodilator order(s) to equivalent RT Bronchodilator order with Frequency of every 4 hours PRN for wheezing or increased work of breathing using Per Protocol order mode.        4-6 - enter or revise RT Bronchodilator order(s) to two equivalent RT bronchodilator orders with one order with BID Frequency and one order with Frequency of every 4 hours PRN wheezing or increased work of breathing using Per Protocol order mode.        7-10 - enter or revise RT Bronchodilator order(s) to two equivalent RT bronchodilator orders with one order with TID Frequency and one order with Frequency of every 4 hours PRN wheezing or increased work of breathing using Per Protocol order mode.       11-13 - enter or revise RT Bronchodilator order(s) to one equivalent RT bronchodilator order with QID Frequency and an Albuterol order with Frequency of every 4 hours PRN wheezing or increased work of breathing using Per Protocol order mode.      Greater than 13 - enter or revise RT Bronchodilator order(s) to one equivalent RT bronchodilator order with every 4 hours Frequency and an Albuterol order with Frequency of every 2 hours PRN wheezing or increased work of breathing using Per Protocol order mode.       Electronically signed by Carleen Mars RCP on 1/29/2025 at 8:15 PM   exacerbation       PLAN:  Keep Doxy   Linda flu X 5   *- O2 goal 92-95   *-IV steroids ,wean To PO when feel better ,and taper over 7 days   *-IV abx Rocephin and Azithromycin Day 1   *-check viral panel /pneumonia panel   *- Sputum culture ,sending sample as she has it in cup   *_If in distress ,will use CPAP/BiPAP  *- procalcitonin  *-BD  ICS if wheezing   Incentive spirmetery and flutter valve   Avoid fluid over load   CT chest :will get CT for better evaluation   Check o2 at ambulation before discharge and if sat less than 88 ,call for O2 at home   May needed NIMV down the road ,will assess as OP   Assess as OP for LVRS or endobronchial valve     respiratory rate 25 breaths per minute or more, then use the equivalent nebulizer order(s) with same Frequency and PRN reasons based on the score.  If a patient is on this medication at home then do not decrease Frequency below that used at home.    0-3 - enter or revise RT bronchodilator order(s) to equivalent RT Bronchodilator order with Frequency of every 4 hours PRN for wheezing or increased work of breathing using Per Protocol order mode.        4-6 - enter or revise RT Bronchodilator order(s) to two equivalent RT bronchodilator orders with one order with BID Frequency and one order with Frequency of every 4 hours PRN wheezing or increased work of breathing using Per Protocol order mode.        7-10 - enter or revise RT Bronchodilator order(s) to two equivalent RT bronchodilator orders with one order with TID Frequency and one order with Frequency of every 4 hours PRN wheezing or increased work of breathing using Per Protocol order mode.       11-13 - enter or revise RT Bronchodilator order(s) to one equivalent RT bronchodilator order with QID Frequency and an Albuterol order with Frequency of every 4 hours PRN wheezing or increased work of breathing using Per Protocol order mode.      Greater than 13 - enter or revise RT Bronchodilator order(s) to one equivalent RT bronchodilator order with every 4 hours Frequency and an Albuterol order with Frequency of every 2 hours PRN wheezing or increased work of breathing using Per Protocol order mode.     PATIENT WILL BENEFIT FROM TREATMENTS.     Electronically signed by Sarita Salvador RCP on 1/29/2025 at 12:41 AM   chronic hypoxic and hypercarbic respiratory failure  Secondary to acute COPD exacerbation  BiPAP recommended but patient could not tolerate for more than a few hours.  Currently on 5 L of oxygen  Used BiPAP all night   Now on 6 L of O2 -> 5 L  Short of breath even at rest BiPAP at night.  Plan is for possible NIMVV at discharge    Acute COPD exacerbation:  DuoNebs, steroids   Pulmonary consulted  Counselled to stop smoking.     Influenza A  Finishing Tamiflu And doxycycline     Full code  General Diet  Lovenox for DVT prophylaxis       Tiffany Monte DO 2/3/2025 12:41 PM            unchanged.           Labs and imaging reviewed     Assessment & Plan:     Principal Problem:    Acute exacerbation of chronic obstructive pulmonary disease (COPD) ([facility])  Active Problems:    Influenza A  Resolved Problems:    * No resolved hospital problems. *         Acute on chronic hypoxic and hypercarbic respiratory failure  Secondary to acute COPD exacerbation  BiPAP recommended but patient could not tolerate for more than a few hours.  Currently on 5 L of oxygen  Used BiPAP all night   Now on 6 L of O2  Short of breath even at rest   Remains on 6L, BiPAP at night.    Acute COPD exacerbation:  DuoNebs, steroids   Pulmonary consulted  Counselled to stop smoking.     Influenza A  Continue Tamiflu And doxycycline     Full code  General Diet  Lovenox for DVT prophylaxis       LASHON GEORGES MD 2/1/2025 3:42 PM            no